# Patient Record
Sex: FEMALE | Race: WHITE | Employment: FULL TIME | URBAN - METROPOLITAN AREA
[De-identification: names, ages, dates, MRNs, and addresses within clinical notes are randomized per-mention and may not be internally consistent; named-entity substitution may affect disease eponyms.]

---

## 2017-01-25 PROBLEM — J45.20 MILD INTERMITTENT ASTHMA WITHOUT COMPLICATION: Status: ACTIVE | Noted: 2017-01-25

## 2017-11-07 PROBLEM — L98.8 SKIN LESION OF BREAST: Status: ACTIVE | Noted: 2017-11-07

## 2018-01-12 PROBLEM — E04.1 THYROID NODULE: Status: ACTIVE | Noted: 2018-01-12

## 2018-05-07 ENCOUNTER — OFFICE VISIT (OUTPATIENT)
Dept: FAMILY MEDICINE CLINIC | Age: 33
End: 2018-05-07
Payer: COMMERCIAL

## 2018-05-07 VITALS
DIASTOLIC BLOOD PRESSURE: 82 MMHG | SYSTOLIC BLOOD PRESSURE: 110 MMHG | WEIGHT: 229 LBS | HEIGHT: 69 IN | HEART RATE: 80 BPM | TEMPERATURE: 98.6 F | BODY MASS INDEX: 33.92 KG/M2 | RESPIRATION RATE: 18 BRPM | OXYGEN SATURATION: 98 %

## 2018-05-07 DIAGNOSIS — J01.90 ACUTE NON-RECURRENT SINUSITIS, UNSPECIFIED LOCATION: Primary | ICD-10-CM

## 2018-05-07 PROCEDURE — 99213 OFFICE O/P EST LOW 20 MIN: CPT | Performed by: PHYSICIAN ASSISTANT

## 2018-05-07 RX ORDER — PREDNISONE 20 MG/1
TABLET ORAL
Qty: 15 TABLET | Refills: 0 | Status: SHIPPED | OUTPATIENT
Start: 2018-05-07 | End: 2018-10-16 | Stop reason: ALTCHOICE

## 2018-05-07 RX ORDER — AMOXICILLIN AND CLAVULANATE POTASSIUM 875; 125 MG/1; MG/1
1 TABLET, FILM COATED ORAL 2 TIMES DAILY
Qty: 20 TABLET | Refills: 0 | Status: SHIPPED | OUTPATIENT
Start: 2018-05-07 | End: 2018-05-17

## 2018-05-07 RX ORDER — OMEPRAZOLE 20 MG/1
40 CAPSULE, DELAYED RELEASE ORAL DAILY
COMMUNITY
End: 2018-10-16 | Stop reason: ALTCHOICE

## 2018-05-08 RX ORDER — FLUTICASONE PROPIONATE 50 MCG
1 SPRAY, SUSPENSION (ML) NASAL DAILY
Qty: 1 BOTTLE | Refills: 3 | Status: SHIPPED | OUTPATIENT
Start: 2018-05-08 | End: 2018-10-06 | Stop reason: SDUPTHER

## 2018-06-06 ENCOUNTER — OFFICE VISIT (OUTPATIENT)
Dept: FAMILY MEDICINE CLINIC | Age: 33
End: 2018-06-06
Payer: COMMERCIAL

## 2018-06-06 VITALS
WEIGHT: 221 LBS | RESPIRATION RATE: 12 BRPM | OXYGEN SATURATION: 98 % | HEIGHT: 69 IN | SYSTOLIC BLOOD PRESSURE: 100 MMHG | TEMPERATURE: 98.2 F | HEART RATE: 70 BPM | DIASTOLIC BLOOD PRESSURE: 80 MMHG | BODY MASS INDEX: 32.73 KG/M2

## 2018-06-06 DIAGNOSIS — J20.9 BRONCHITIS WITH BRONCHOSPASM: Primary | ICD-10-CM

## 2018-06-06 DIAGNOSIS — J45.21 MILD INTERMITTENT ASTHMA WITH EXACERBATION: ICD-10-CM

## 2018-06-06 PROCEDURE — 99213 OFFICE O/P EST LOW 20 MIN: CPT | Performed by: PHYSICIAN ASSISTANT

## 2018-06-06 RX ORDER — ALBUTEROL SULFATE 90 UG/1
2 AEROSOL, METERED RESPIRATORY (INHALATION) EVERY 6 HOURS PRN
Qty: 1 INHALER | Refills: 0 | Status: SHIPPED | OUTPATIENT
Start: 2018-06-06 | End: 2020-01-08

## 2018-06-06 RX ORDER — DOXYCYCLINE HYCLATE 100 MG/1
100 CAPSULE ORAL 2 TIMES DAILY
Qty: 20 CAPSULE | Refills: 0 | Status: SHIPPED | OUTPATIENT
Start: 2018-06-06 | End: 2018-06-16

## 2018-06-06 RX ORDER — BENZONATATE 200 MG/1
200 CAPSULE ORAL 3 TIMES DAILY PRN
Qty: 15 CAPSULE | Refills: 0 | Status: SHIPPED | OUTPATIENT
Start: 2018-06-06 | End: 2019-02-08 | Stop reason: ALTCHOICE

## 2018-10-05 ENCOUNTER — OFFICE VISIT (OUTPATIENT)
Dept: FAMILY MEDICINE CLINIC | Age: 33
End: 2018-10-05
Payer: COMMERCIAL

## 2018-10-05 VITALS
OXYGEN SATURATION: 98 % | DIASTOLIC BLOOD PRESSURE: 70 MMHG | BODY MASS INDEX: 32.73 KG/M2 | RESPIRATION RATE: 18 BRPM | HEART RATE: 92 BPM | SYSTOLIC BLOOD PRESSURE: 104 MMHG | WEIGHT: 221 LBS | HEIGHT: 69 IN

## 2018-10-05 DIAGNOSIS — N92.6 IRREGULAR MENSTRUATION: ICD-10-CM

## 2018-10-05 DIAGNOSIS — F39 MOOD DISORDER (HCC): Primary | ICD-10-CM

## 2018-10-05 PROCEDURE — 1036F TOBACCO NON-USER: CPT | Performed by: FAMILY MEDICINE

## 2018-10-05 PROCEDURE — G8417 CALC BMI ABV UP PARAM F/U: HCPCS | Performed by: FAMILY MEDICINE

## 2018-10-05 PROCEDURE — G8427 DOCREV CUR MEDS BY ELIG CLIN: HCPCS | Performed by: FAMILY MEDICINE

## 2018-10-05 PROCEDURE — 99213 OFFICE O/P EST LOW 20 MIN: CPT | Performed by: FAMILY MEDICINE

## 2018-10-05 PROCEDURE — G8484 FLU IMMUNIZE NO ADMIN: HCPCS | Performed by: FAMILY MEDICINE

## 2018-10-05 ASSESSMENT — PATIENT HEALTH QUESTIONNAIRE - PHQ9
SUM OF ALL RESPONSES TO PHQ9 QUESTIONS 1 & 2: 2
1. LITTLE INTEREST OR PLEASURE IN DOING THINGS: 1
2. FEELING DOWN, DEPRESSED OR HOPELESS: 1
SUM OF ALL RESPONSES TO PHQ QUESTIONS 1-9: 2
SUM OF ALL RESPONSES TO PHQ QUESTIONS 1-9: 2

## 2018-10-08 RX ORDER — FLUTICASONE PROPIONATE 50 MCG
SPRAY, SUSPENSION (ML) NASAL
Qty: 1 BOTTLE | Refills: 3 | Status: SHIPPED | OUTPATIENT
Start: 2018-10-08 | End: 2019-03-21 | Stop reason: SDUPTHER

## 2018-10-16 ENCOUNTER — APPOINTMENT (OUTPATIENT)
Dept: ULTRASOUND IMAGING | Age: 33
End: 2018-10-16
Payer: COMMERCIAL

## 2018-10-16 ENCOUNTER — HOSPITAL ENCOUNTER (EMERGENCY)
Age: 33
Discharge: HOME | End: 2018-10-16
Attending: EMERGENCY MEDICINE
Payer: COMMERCIAL

## 2018-10-16 VITALS
WEIGHT: 215 LBS | DIASTOLIC BLOOD PRESSURE: 66 MMHG | SYSTOLIC BLOOD PRESSURE: 112 MMHG | HEIGHT: 69 IN | RESPIRATION RATE: 16 BRPM | TEMPERATURE: 98.4 F | HEART RATE: 85 BPM | OXYGEN SATURATION: 94 % | BODY MASS INDEX: 31.84 KG/M2

## 2018-10-16 DIAGNOSIS — R10.2 PELVIC PAIN: Primary | ICD-10-CM

## 2018-10-16 LAB
ANION GAP SERPL CALCULATED.3IONS-SCNC: 10 MMOL/L (ref 7–16)
BASOPHILS ABSOLUTE: 0.02 E9/L (ref 0–0.2)
BASOPHILS RELATIVE PERCENT: 0.3 % (ref 0–2)
BILIRUBIN URINE: NEGATIVE
BLOOD, URINE: NEGATIVE
BUN BLDV-MCNC: 9 MG/DL (ref 6–20)
CALCIUM SERPL-MCNC: 8.8 MG/DL (ref 8.6–10.2)
CHLORIDE BLD-SCNC: 106 MMOL/L (ref 98–107)
CLARITY: CLEAR
CLUE CELLS: NORMAL
CO2: 24 MMOL/L (ref 22–29)
COLOR: YELLOW
CREAT SERPL-MCNC: 0.7 MG/DL (ref 0.5–1)
EOSINOPHILS ABSOLUTE: 0.14 E9/L (ref 0.05–0.5)
EOSINOPHILS RELATIVE PERCENT: 2.2 % (ref 0–6)
GFR AFRICAN AMERICAN: >60
GFR NON-AFRICAN AMERICAN: >60 ML/MIN/1.73
GLUCOSE BLD-MCNC: 88 MG/DL (ref 74–109)
GLUCOSE URINE: NEGATIVE MG/DL
HCT VFR BLD CALC: 37.5 % (ref 34–48)
HEMOGLOBIN: 12.9 G/DL (ref 11.5–15.5)
IMMATURE GRANULOCYTES #: 0.01 E9/L
IMMATURE GRANULOCYTES %: 0.2 % (ref 0–5)
KETONES, URINE: NEGATIVE MG/DL
LEUKOCYTE ESTERASE, URINE: NEGATIVE
LYMPHOCYTES ABSOLUTE: 1.16 E9/L (ref 1.5–4)
LYMPHOCYTES RELATIVE PERCENT: 18.5 % (ref 20–42)
MCH RBC QN AUTO: 31.9 PG (ref 26–35)
MCHC RBC AUTO-ENTMCNC: 34.4 % (ref 32–34.5)
MCV RBC AUTO: 92.8 FL (ref 80–99.9)
MONOCYTES ABSOLUTE: 0.66 E9/L (ref 0.1–0.95)
MONOCYTES RELATIVE PERCENT: 10.5 % (ref 2–12)
NEUTROPHILS ABSOLUTE: 4.29 E9/L (ref 1.8–7.3)
NEUTROPHILS RELATIVE PERCENT: 68.3 % (ref 43–80)
NITRITE, URINE: NEGATIVE
PDW BLD-RTO: 12 FL (ref 11.5–15)
PH UA: 6.5 (ref 5–9)
PLATELET # BLD: 284 E9/L (ref 130–450)
PMV BLD AUTO: 9.8 FL (ref 7–12)
POTASSIUM SERPL-SCNC: 3.7 MMOL/L (ref 3.5–5)
PROTEIN UA: NEGATIVE MG/DL
RBC # BLD: 4.04 E12/L (ref 3.5–5.5)
SODIUM BLD-SCNC: 140 MMOL/L (ref 132–146)
SOURCE WET PREP: NORMAL
SPECIFIC GRAVITY UA: <=1.005 (ref 1–1.03)
TRICHOMONAS PREP: NORMAL
UROBILINOGEN, URINE: 0.2 E.U./DL
WBC # BLD: 6.3 E9/L (ref 4.5–11.5)
YEAST WET PREP: NORMAL

## 2018-10-16 PROCEDURE — 80048 BASIC METABOLIC PNL TOTAL CA: CPT

## 2018-10-16 PROCEDURE — 76856 US EXAM PELVIC COMPLETE: CPT

## 2018-10-16 PROCEDURE — 85025 COMPLETE CBC W/AUTO DIFF WBC: CPT

## 2018-10-16 PROCEDURE — 99284 EMERGENCY DEPT VISIT MOD MDM: CPT

## 2018-10-16 PROCEDURE — 87210 SMEAR WET MOUNT SALINE/INK: CPT

## 2018-10-16 PROCEDURE — 87491 CHLMYD TRACH DNA AMP PROBE: CPT

## 2018-10-16 PROCEDURE — 6360000002 HC RX W HCPCS: Performed by: EMERGENCY MEDICINE

## 2018-10-16 PROCEDURE — 96374 THER/PROPH/DIAG INJ IV PUSH: CPT

## 2018-10-16 PROCEDURE — 93975 VASCULAR STUDY: CPT

## 2018-10-16 PROCEDURE — 87591 N.GONORRHOEAE DNA AMP PROB: CPT

## 2018-10-16 PROCEDURE — 81003 URINALYSIS AUTO W/O SCOPE: CPT

## 2018-10-16 PROCEDURE — 36415 COLL VENOUS BLD VENIPUNCTURE: CPT

## 2018-10-16 RX ORDER — NAPROXEN 500 MG/1
500 TABLET ORAL 2 TIMES DAILY PRN
Qty: 28 TABLET | Refills: 0 | Status: SHIPPED | OUTPATIENT
Start: 2018-10-16 | End: 2019-02-08 | Stop reason: ALTCHOICE

## 2018-10-16 RX ORDER — FENTANYL CITRATE 50 UG/ML
100 INJECTION, SOLUTION INTRAMUSCULAR; INTRAVENOUS ONCE
Status: COMPLETED | OUTPATIENT
Start: 2018-10-16 | End: 2018-10-16

## 2018-10-16 RX ADMIN — FENTANYL CITRATE 100 MCG: 50 INJECTION, SOLUTION INTRAMUSCULAR; INTRAVENOUS at 18:13

## 2018-10-16 ASSESSMENT — ENCOUNTER SYMPTOMS
BACK PAIN: 0
COUGH: 0
VOMITING: 0
NAUSEA: 0
ABDOMINAL PAIN: 0
SHORTNESS OF BREATH: 0
BLOOD IN STOOL: 0

## 2018-10-16 ASSESSMENT — PAIN SCALES - GENERAL: PAINLEVEL_OUTOF10: 5

## 2018-10-16 NOTE — ED PROVIDER NOTES
Patient is a 68-year-old female presenting to the ED with pelvic pain. Patient has bilateral lower pelvic pain states that she is 2 weeks late on her period. Patient checked a presents S3 times all were negative. Patient denies any vomiting diarrhea states that the pain comes in waves states it radiates up towards her back on the right. Patient had a fever of 100.82 days ago treated with ibuprofen no recurrence of fever after that. Patient denies any change in urination and vaginal discharge or bleeding. Patient denies any worsening of symptoms with eating movement. Review of Systems   Constitutional: Negative for chills and fever. Respiratory: Negative for cough and shortness of breath. Cardiovascular: Negative for chest pain. Gastrointestinal: Negative for abdominal pain, blood in stool, nausea and vomiting. Genitourinary: Positive for flank pain and pelvic pain. Negative for dysuria and frequency. Musculoskeletal: Negative for back pain. Skin: Negative for rash. Neurological: Negative for weakness and headaches. All other systems reviewed and are negative. Physical Exam   Constitutional: She is oriented to person, place, and time. She appears well-developed and well-nourished. No distress. HENT:   Head: Normocephalic and atraumatic. Eyes: Conjunctivae are normal.   Neck: Normal range of motion. Neck supple. Cardiovascular: Normal rate, regular rhythm and normal heart sounds. No murmur heard. Pulmonary/Chest: Effort normal and breath sounds normal. No respiratory distress. She has no wheezes. She has no rales. Abdominal: Soft. Bowel sounds are normal. There is no rebound and no guarding. Mild right CVA tenderness   Musculoskeletal: She exhibits no edema. Neurological: She is alert and oriented to person, place, and time. No cranial nerve deficit. Coordination normal.   Skin: Skin is warm and dry. She is not diaphoretic.    Nursing note and vitals

## 2018-12-12 ENCOUNTER — TELEPHONE (OUTPATIENT)
Dept: ADMINISTRATIVE | Age: 33
End: 2018-12-12

## 2018-12-26 ENCOUNTER — TELEPHONE (OUTPATIENT)
Dept: ADMINISTRATIVE | Age: 33
End: 2018-12-26

## 2019-01-20 ENCOUNTER — HOSPITAL ENCOUNTER (EMERGENCY)
Age: 34
Discharge: HOME | End: 2019-01-20
Payer: COMMERCIAL

## 2019-01-20 ENCOUNTER — APPOINTMENT (OUTPATIENT)
Dept: GENERAL RADIOLOGY | Age: 34
End: 2019-01-20
Payer: COMMERCIAL

## 2019-01-20 VITALS
DIASTOLIC BLOOD PRESSURE: 71 MMHG | WEIGHT: 220 LBS | BODY MASS INDEX: 32.58 KG/M2 | HEART RATE: 74 BPM | RESPIRATION RATE: 16 BRPM | OXYGEN SATURATION: 98 % | TEMPERATURE: 98.2 F | HEIGHT: 69 IN | SYSTOLIC BLOOD PRESSURE: 106 MMHG

## 2019-01-20 DIAGNOSIS — S39.012A STRAIN OF LUMBAR REGION, INITIAL ENCOUNTER: Primary | ICD-10-CM

## 2019-01-20 PROCEDURE — 6360000002 HC RX W HCPCS: Performed by: PHYSICIAN ASSISTANT

## 2019-01-20 PROCEDURE — 99283 EMERGENCY DEPT VISIT LOW MDM: CPT

## 2019-01-20 PROCEDURE — 6370000000 HC RX 637 (ALT 250 FOR IP): Performed by: PHYSICIAN ASSISTANT

## 2019-01-20 PROCEDURE — 72110 X-RAY EXAM L-2 SPINE 4/>VWS: CPT

## 2019-01-20 PROCEDURE — 96372 THER/PROPH/DIAG INJ SC/IM: CPT

## 2019-01-20 RX ORDER — CYCLOBENZAPRINE HCL 10 MG
10 TABLET ORAL 3 TIMES DAILY PRN
Qty: 30 TABLET | Refills: 0 | Status: SHIPPED | OUTPATIENT
Start: 2019-01-20 | End: 2019-02-08 | Stop reason: ALTCHOICE

## 2019-01-20 RX ORDER — ORPHENADRINE CITRATE 30 MG/ML
60 INJECTION INTRAMUSCULAR; INTRAVENOUS ONCE
Status: COMPLETED | OUTPATIENT
Start: 2019-01-20 | End: 2019-01-20

## 2019-01-20 RX ORDER — PREDNISONE 20 MG/1
40 TABLET ORAL DAILY
Qty: 10 TABLET | Refills: 0 | Status: SHIPPED | OUTPATIENT
Start: 2019-01-20 | End: 2019-01-25

## 2019-01-20 RX ORDER — PREDNISONE 20 MG/1
40 TABLET ORAL ONCE
Status: COMPLETED | OUTPATIENT
Start: 2019-01-20 | End: 2019-01-20

## 2019-01-20 RX ORDER — KETOROLAC TROMETHAMINE 30 MG/ML
60 INJECTION, SOLUTION INTRAMUSCULAR; INTRAVENOUS ONCE
Status: COMPLETED | OUTPATIENT
Start: 2019-01-20 | End: 2019-01-20

## 2019-01-20 RX ORDER — TRAMADOL HYDROCHLORIDE 50 MG/1
50 TABLET ORAL EVERY 6 HOURS PRN
Qty: 12 TABLET | Refills: 0 | Status: SHIPPED | OUTPATIENT
Start: 2019-01-20 | End: 2019-01-25

## 2019-01-20 RX ADMIN — KETOROLAC TROMETHAMINE 60 MG: 30 INJECTION, SOLUTION INTRAMUSCULAR at 20:06

## 2019-01-20 RX ADMIN — PREDNISONE 40 MG: 20 TABLET ORAL at 20:05

## 2019-01-20 RX ADMIN — ORPHENADRINE CITRATE 60 MG: 30 INJECTION INTRAMUSCULAR; INTRAVENOUS at 20:09

## 2019-01-20 ASSESSMENT — PAIN SCALES - GENERAL
PAINLEVEL_OUTOF10: 8
PAINLEVEL_OUTOF10: 6
PAINLEVEL_OUTOF10: 8

## 2019-01-20 ASSESSMENT — PAIN DESCRIPTION - PAIN TYPE: TYPE: ACUTE PAIN

## 2019-01-20 ASSESSMENT — PAIN DESCRIPTION - LOCATION: LOCATION: BACK

## 2019-01-20 ASSESSMENT — PAIN DESCRIPTION - PROGRESSION: CLINICAL_PROGRESSION: GRADUALLY IMPROVING

## 2019-01-30 ENCOUNTER — HOSPITAL ENCOUNTER (OUTPATIENT)
Age: 34
Discharge: HOME | End: 2019-01-30
Payer: COMMERCIAL

## 2019-01-30 ENCOUNTER — HOSPITAL ENCOUNTER (EMERGENCY)
Age: 34
Discharge: HOME | End: 2019-01-30
Attending: EMERGENCY MEDICINE
Payer: COMMERCIAL

## 2019-01-30 ENCOUNTER — TELEPHONE (OUTPATIENT)
Dept: CARDIOLOGY CLINIC | Age: 34
End: 2019-01-30

## 2019-01-30 VITALS
TEMPERATURE: 97.7 F | BODY MASS INDEX: 32.44 KG/M2 | DIASTOLIC BLOOD PRESSURE: 68 MMHG | RESPIRATION RATE: 14 BRPM | WEIGHT: 219 LBS | HEIGHT: 69 IN | OXYGEN SATURATION: 100 % | HEART RATE: 78 BPM | SYSTOLIC BLOOD PRESSURE: 103 MMHG

## 2019-01-30 DIAGNOSIS — R00.2 PALPITATION: Primary | ICD-10-CM

## 2019-01-30 LAB
ALBUMIN SERPL-MCNC: 4.4 G/DL (ref 3.5–5.2)
ALP BLD-CCNC: 45 U/L (ref 35–104)
ALT SERPL-CCNC: 16 U/L (ref 0–32)
ANION GAP SERPL CALCULATED.3IONS-SCNC: 10 MMOL/L (ref 7–16)
AST SERPL-CCNC: 15 U/L (ref 0–31)
BASOPHILS ABSOLUTE: 0.05 E9/L (ref 0–0.2)
BASOPHILS RELATIVE PERCENT: 0.5 % (ref 0–2)
BILIRUB SERPL-MCNC: 0.2 MG/DL (ref 0–1.2)
BUN BLDV-MCNC: 8 MG/DL (ref 6–20)
CALCIUM SERPL-MCNC: 9.4 MG/DL (ref 8.6–10.2)
CHLORIDE BLD-SCNC: 103 MMOL/L (ref 98–107)
CO2: 28 MMOL/L (ref 22–29)
CREAT SERPL-MCNC: 0.7 MG/DL (ref 0.5–1)
EOSINOPHILS ABSOLUTE: 0.27 E9/L (ref 0.05–0.5)
EOSINOPHILS RELATIVE PERCENT: 2.5 % (ref 0–6)
GFR AFRICAN AMERICAN: >60
GFR NON-AFRICAN AMERICAN: >60 ML/MIN/1.73
GLUCOSE BLD-MCNC: 90 MG/DL (ref 74–99)
HCT VFR BLD CALC: 42.9 % (ref 34–48)
HEMOGLOBIN: 14.2 G/DL (ref 11.5–15.5)
IMMATURE GRANULOCYTES #: 0.04 E9/L
IMMATURE GRANULOCYTES %: 0.4 % (ref 0–5)
LYMPHOCYTES ABSOLUTE: 3.13 E9/L (ref 1.5–4)
LYMPHOCYTES RELATIVE PERCENT: 28.4 % (ref 20–42)
MAGNESIUM: 2.1 MG/DL (ref 1.6–2.6)
MCH RBC QN AUTO: 31.5 PG (ref 26–35)
MCHC RBC AUTO-ENTMCNC: 33.1 % (ref 32–34.5)
MCV RBC AUTO: 95.1 FL (ref 80–99.9)
MONOCYTES ABSOLUTE: 0.61 E9/L (ref 0.1–0.95)
MONOCYTES RELATIVE PERCENT: 5.5 % (ref 2–12)
NEUTROPHILS ABSOLUTE: 6.91 E9/L (ref 1.8–7.3)
NEUTROPHILS RELATIVE PERCENT: 62.7 % (ref 43–80)
PDW BLD-RTO: 11.9 FL (ref 11.5–15)
PHOSPHORUS: 3.3 MG/DL (ref 2.5–4.5)
PLATELET # BLD: 401 E9/L (ref 130–450)
PMV BLD AUTO: 10.1 FL (ref 7–12)
POTASSIUM SERPL-SCNC: 3.9 MMOL/L (ref 3.5–5)
RBC # BLD: 4.51 E12/L (ref 3.5–5.5)
SODIUM BLD-SCNC: 141 MMOL/L (ref 132–146)
T4 FREE: 1.39 NG/DL (ref 0.93–1.7)
TOTAL PROTEIN: 7.4 G/DL (ref 6.4–8.3)
TROPONIN: <0.01 NG/ML (ref 0–0.03)
TROPONIN: <0.01 NG/ML (ref 0–0.03)
TSH SERPL DL<=0.05 MIU/L-ACNC: 0.54 UIU/ML (ref 0.27–4.2)
WBC # BLD: 11 E9/L (ref 4.5–11.5)

## 2019-01-30 PROCEDURE — 99285 EMERGENCY DEPT VISIT HI MDM: CPT

## 2019-01-30 PROCEDURE — 83735 ASSAY OF MAGNESIUM: CPT

## 2019-01-30 PROCEDURE — 84484 ASSAY OF TROPONIN QUANT: CPT

## 2019-01-30 PROCEDURE — 84100 ASSAY OF PHOSPHORUS: CPT

## 2019-01-30 PROCEDURE — 85025 COMPLETE CBC W/AUTO DIFF WBC: CPT

## 2019-01-30 PROCEDURE — 84443 ASSAY THYROID STIM HORMONE: CPT

## 2019-01-30 PROCEDURE — 80053 COMPREHEN METABOLIC PANEL: CPT

## 2019-01-30 PROCEDURE — 84439 ASSAY OF FREE THYROXINE: CPT

## 2019-01-30 PROCEDURE — 2580000003 HC RX 258: Performed by: STUDENT IN AN ORGANIZED HEALTH CARE EDUCATION/TRAINING PROGRAM

## 2019-01-30 PROCEDURE — 36415 COLL VENOUS BLD VENIPUNCTURE: CPT

## 2019-01-30 RX ORDER — 0.9 % SODIUM CHLORIDE 0.9 %
1000 INTRAVENOUS SOLUTION INTRAVENOUS ONCE
Status: COMPLETED | OUTPATIENT
Start: 2019-01-30 | End: 2019-01-30

## 2019-01-30 RX ADMIN — SODIUM CHLORIDE 1000 ML: 9 INJECTION, SOLUTION INTRAVENOUS at 14:43

## 2019-01-30 ASSESSMENT — ENCOUNTER SYMPTOMS
SORE THROAT: 0
BACK PAIN: 0
CHEST TIGHTNESS: 0
CONSTIPATION: 0
BLOOD IN STOOL: 0
RHINORRHEA: 0
SHORTNESS OF BREATH: 0
NAUSEA: 0
VOMITING: 0
COUGH: 0
WHEEZING: 0
ABDOMINAL PAIN: 0
DIARRHEA: 0

## 2019-02-08 ENCOUNTER — OFFICE VISIT (OUTPATIENT)
Dept: FAMILY MEDICINE CLINIC | Age: 34
End: 2019-02-08
Payer: COMMERCIAL

## 2019-02-08 VITALS
SYSTOLIC BLOOD PRESSURE: 110 MMHG | HEART RATE: 86 BPM | BODY MASS INDEX: 31.84 KG/M2 | DIASTOLIC BLOOD PRESSURE: 82 MMHG | WEIGHT: 215 LBS | OXYGEN SATURATION: 98 % | RESPIRATION RATE: 18 BRPM | HEIGHT: 69 IN

## 2019-02-08 DIAGNOSIS — F32.A ANXIETY AND DEPRESSION: Primary | ICD-10-CM

## 2019-02-08 DIAGNOSIS — F41.9 ANXIETY AND DEPRESSION: Primary | ICD-10-CM

## 2019-02-08 LAB
EKG ATRIAL RATE: 98 BPM
EKG P AXIS: 58 DEGREES
EKG P-R INTERVAL: 134 MS
EKG Q-T INTERVAL: 354 MS
EKG QRS DURATION: 82 MS
EKG QTC CALCULATION (BAZETT): 451 MS
EKG R AXIS: 55 DEGREES
EKG T AXIS: 47 DEGREES
EKG VENTRICULAR RATE: 98 BPM

## 2019-02-08 PROCEDURE — 1036F TOBACCO NON-USER: CPT | Performed by: FAMILY MEDICINE

## 2019-02-08 PROCEDURE — G0444 DEPRESSION SCREEN ANNUAL: HCPCS | Performed by: FAMILY MEDICINE

## 2019-02-08 PROCEDURE — G8484 FLU IMMUNIZE NO ADMIN: HCPCS | Performed by: FAMILY MEDICINE

## 2019-02-08 PROCEDURE — G8417 CALC BMI ABV UP PARAM F/U: HCPCS | Performed by: FAMILY MEDICINE

## 2019-02-08 PROCEDURE — 99213 OFFICE O/P EST LOW 20 MIN: CPT | Performed by: FAMILY MEDICINE

## 2019-02-08 PROCEDURE — G8427 DOCREV CUR MEDS BY ELIG CLIN: HCPCS | Performed by: FAMILY MEDICINE

## 2019-02-08 RX ORDER — HYDROXYZINE HYDROCHLORIDE 25 MG/1
25 TABLET, FILM COATED ORAL NIGHTLY PRN
Qty: 30 TABLET | Refills: 1 | Status: SHIPPED | OUTPATIENT
Start: 2019-02-08 | End: 2019-02-18

## 2019-02-08 RX ORDER — ESCITALOPRAM OXALATE 5 MG/1
5 TABLET ORAL DAILY
Qty: 30 TABLET | Refills: 1 | Status: SHIPPED | OUTPATIENT
Start: 2019-02-08 | End: 2019-08-02 | Stop reason: ALTCHOICE

## 2019-02-08 ASSESSMENT — PATIENT HEALTH QUESTIONNAIRE - PHQ9
9. THOUGHTS THAT YOU WOULD BE BETTER OFF DEAD, OR OF HURTING YOURSELF: 0
5. POOR APPETITE OR OVEREATING: 3
8. MOVING OR SPEAKING SO SLOWLY THAT OTHER PEOPLE COULD HAVE NOTICED. OR THE OPPOSITE, BEING SO FIGETY OR RESTLESS THAT YOU HAVE BEEN MOVING AROUND A LOT MORE THAN USUAL: 2
1. LITTLE INTEREST OR PLEASURE IN DOING THINGS: 3
6. FEELING BAD ABOUT YOURSELF - OR THAT YOU ARE A FAILURE OR HAVE LET YOURSELF OR YOUR FAMILY DOWN: 2
4. FEELING TIRED OR HAVING LITTLE ENERGY: 3
7. TROUBLE CONCENTRATING ON THINGS, SUCH AS READING THE NEWSPAPER OR WATCHING TELEVISION: 3
SUM OF ALL RESPONSES TO PHQ QUESTIONS 1-9: 22
10. IF YOU CHECKED OFF ANY PROBLEMS, HOW DIFFICULT HAVE THESE PROBLEMS MADE IT FOR YOU TO DO YOUR WORK, TAKE CARE OF THINGS AT HOME, OR GET ALONG WITH OTHER PEOPLE: 3
SUM OF ALL RESPONSES TO PHQ QUESTIONS 1-9: 22
SUM OF ALL RESPONSES TO PHQ9 QUESTIONS 1 & 2: 6
2. FEELING DOWN, DEPRESSED OR HOPELESS: 3
3. TROUBLE FALLING OR STAYING ASLEEP: 3

## 2019-03-21 DIAGNOSIS — J01.90 ACUTE NON-RECURRENT SINUSITIS, UNSPECIFIED LOCATION: Primary | ICD-10-CM

## 2019-03-22 RX ORDER — FLUTICASONE PROPIONATE 50 MCG
SPRAY, SUSPENSION (ML) NASAL
Qty: 1 BOTTLE | Refills: 3 | Status: SHIPPED | OUTPATIENT
Start: 2019-03-22 | End: 2020-01-08 | Stop reason: SDUPTHER

## 2019-05-01 ENCOUNTER — HOSPITAL ENCOUNTER (EMERGENCY)
Age: 34
Discharge: HOME | End: 2019-05-01
Attending: EMERGENCY MEDICINE
Payer: COMMERCIAL

## 2019-05-01 ENCOUNTER — APPOINTMENT (OUTPATIENT)
Dept: CT IMAGING | Age: 34
End: 2019-05-01
Payer: COMMERCIAL

## 2019-05-01 VITALS
BODY MASS INDEX: 33.33 KG/M2 | HEIGHT: 69 IN | SYSTOLIC BLOOD PRESSURE: 110 MMHG | WEIGHT: 225 LBS | RESPIRATION RATE: 16 BRPM | HEART RATE: 74 BPM | OXYGEN SATURATION: 99 % | TEMPERATURE: 98.8 F | DIASTOLIC BLOOD PRESSURE: 70 MMHG

## 2019-05-01 DIAGNOSIS — M54.31 SCIATICA OF RIGHT SIDE: Primary | ICD-10-CM

## 2019-05-01 LAB
ALBUMIN SERPL-MCNC: 4.5 G/DL (ref 3.5–5.2)
ALP BLD-CCNC: 35 U/L (ref 35–104)
ALT SERPL-CCNC: 13 U/L (ref 0–32)
ANION GAP SERPL CALCULATED.3IONS-SCNC: 11 MMOL/L (ref 7–16)
AST SERPL-CCNC: 15 U/L (ref 0–31)
BACTERIA: NORMAL /HPF
BASOPHILS ABSOLUTE: 0.06 E9/L (ref 0–0.2)
BASOPHILS RELATIVE PERCENT: 0.5 % (ref 0–2)
BILIRUB SERPL-MCNC: <0.2 MG/DL (ref 0–1.2)
BILIRUBIN URINE: NEGATIVE
BLOOD, URINE: NEGATIVE
BUN BLDV-MCNC: 18 MG/DL (ref 6–20)
CALCIUM SERPL-MCNC: 9.7 MG/DL (ref 8.6–10.2)
CHLORIDE BLD-SCNC: 103 MMOL/L (ref 98–107)
CLARITY: CLEAR
CO2: 26 MMOL/L (ref 22–29)
COLOR: YELLOW
CREAT SERPL-MCNC: 0.8 MG/DL (ref 0.5–1)
EOSINOPHILS ABSOLUTE: 0.23 E9/L (ref 0.05–0.5)
EOSINOPHILS RELATIVE PERCENT: 2 % (ref 0–6)
EPITHELIAL CELLS, UA: NORMAL /HPF
GFR AFRICAN AMERICAN: >60
GFR NON-AFRICAN AMERICAN: >60 ML/MIN/1.73
GLUCOSE BLD-MCNC: 76 MG/DL (ref 74–99)
GLUCOSE URINE: NEGATIVE MG/DL
HCG, URINE, POC: NEGATIVE
HCT VFR BLD CALC: 40.1 % (ref 34–48)
HEMOGLOBIN: 13.5 G/DL (ref 11.5–15.5)
IMMATURE GRANULOCYTES #: 0.04 E9/L
IMMATURE GRANULOCYTES %: 0.3 % (ref 0–5)
KETONES, URINE: ABNORMAL MG/DL
LACTIC ACID: 1.3 MMOL/L (ref 0.5–2.2)
LEUKOCYTE ESTERASE, URINE: ABNORMAL
LIPASE: 29 U/L (ref 13–60)
LYMPHOCYTES ABSOLUTE: 3.12 E9/L (ref 1.5–4)
LYMPHOCYTES RELATIVE PERCENT: 27.1 % (ref 20–42)
Lab: NORMAL
MCH RBC QN AUTO: 31.6 PG (ref 26–35)
MCHC RBC AUTO-ENTMCNC: 33.7 % (ref 32–34.5)
MCV RBC AUTO: 93.9 FL (ref 80–99.9)
MONOCYTES ABSOLUTE: 0.62 E9/L (ref 0.1–0.95)
MONOCYTES RELATIVE PERCENT: 5.4 % (ref 2–12)
NEGATIVE QC PASS/FAIL: NORMAL
NEUTROPHILS ABSOLUTE: 7.46 E9/L (ref 1.8–7.3)
NEUTROPHILS RELATIVE PERCENT: 64.7 % (ref 43–80)
NITRITE, URINE: NEGATIVE
PDW BLD-RTO: 11.9 FL (ref 11.5–15)
PH UA: 6 (ref 5–9)
PLATELET # BLD: 319 E9/L (ref 130–450)
PMV BLD AUTO: 10.3 FL (ref 7–12)
POSITIVE QC PASS/FAIL: NORMAL
POTASSIUM SERPL-SCNC: 4.4 MMOL/L (ref 3.5–5)
PROTEIN UA: NEGATIVE MG/DL
RBC # BLD: 4.27 E12/L (ref 3.5–5.5)
RBC UA: NORMAL /HPF (ref 0–2)
SODIUM BLD-SCNC: 140 MMOL/L (ref 132–146)
SPECIFIC GRAVITY UA: >=1.03 (ref 1–1.03)
TOTAL PROTEIN: 7.3 G/DL (ref 6.4–8.3)
UROBILINOGEN, URINE: 0.2 E.U./DL
WBC # BLD: 11.5 E9/L (ref 4.5–11.5)
WBC UA: NORMAL /HPF (ref 0–5)

## 2019-05-01 PROCEDURE — 83690 ASSAY OF LIPASE: CPT

## 2019-05-01 PROCEDURE — 81001 URINALYSIS AUTO W/SCOPE: CPT

## 2019-05-01 PROCEDURE — 6370000000 HC RX 637 (ALT 250 FOR IP): Performed by: EMERGENCY MEDICINE

## 2019-05-01 PROCEDURE — 99284 EMERGENCY DEPT VISIT MOD MDM: CPT

## 2019-05-01 PROCEDURE — 96375 TX/PRO/DX INJ NEW DRUG ADDON: CPT

## 2019-05-01 PROCEDURE — 2580000003 HC RX 258: Performed by: STUDENT IN AN ORGANIZED HEALTH CARE EDUCATION/TRAINING PROGRAM

## 2019-05-01 PROCEDURE — 85025 COMPLETE CBC W/AUTO DIFF WBC: CPT

## 2019-05-01 PROCEDURE — 74176 CT ABD & PELVIS W/O CONTRAST: CPT

## 2019-05-01 PROCEDURE — 6360000002 HC RX W HCPCS: Performed by: EMERGENCY MEDICINE

## 2019-05-01 PROCEDURE — 96372 THER/PROPH/DIAG INJ SC/IM: CPT

## 2019-05-01 PROCEDURE — 83605 ASSAY OF LACTIC ACID: CPT

## 2019-05-01 PROCEDURE — 96374 THER/PROPH/DIAG INJ IV PUSH: CPT

## 2019-05-01 PROCEDURE — 80053 COMPREHEN METABOLIC PANEL: CPT

## 2019-05-01 PROCEDURE — 6360000002 HC RX W HCPCS: Performed by: STUDENT IN AN ORGANIZED HEALTH CARE EDUCATION/TRAINING PROGRAM

## 2019-05-01 RX ORDER — NAPROXEN 375 MG/1
375 TABLET ORAL 2 TIMES DAILY WITH MEALS
Qty: 10 TABLET | Refills: 0 | Status: SHIPPED | OUTPATIENT
Start: 2019-05-01 | End: 2020-10-19

## 2019-05-01 RX ORDER — KETOROLAC TROMETHAMINE 30 MG/ML
15 INJECTION, SOLUTION INTRAMUSCULAR; INTRAVENOUS ONCE
Status: COMPLETED | OUTPATIENT
Start: 2019-05-01 | End: 2019-05-01

## 2019-05-01 RX ORDER — ORPHENADRINE CITRATE 30 MG/ML
60 INJECTION INTRAMUSCULAR; INTRAVENOUS ONCE
Status: COMPLETED | OUTPATIENT
Start: 2019-05-01 | End: 2019-05-01

## 2019-05-01 RX ORDER — HYDROCODONE BITARTRATE AND ACETAMINOPHEN 5; 325 MG/1; MG/1
1 TABLET ORAL ONCE
Status: COMPLETED | OUTPATIENT
Start: 2019-05-01 | End: 2019-05-01

## 2019-05-01 RX ORDER — FENTANYL CITRATE 50 UG/ML
50 INJECTION, SOLUTION INTRAMUSCULAR; INTRAVENOUS ONCE
Status: COMPLETED | OUTPATIENT
Start: 2019-05-01 | End: 2019-05-01

## 2019-05-01 RX ORDER — CYCLOBENZAPRINE HCL 10 MG
10 TABLET ORAL 3 TIMES DAILY PRN
Qty: 12 TABLET | Refills: 0 | Status: SHIPPED | OUTPATIENT
Start: 2019-05-01 | End: 2019-08-02

## 2019-05-01 RX ORDER — METHYLPREDNISOLONE 4 MG/1
TABLET ORAL
Qty: 1 KIT | Status: SHIPPED | OUTPATIENT
Start: 2019-05-01 | End: 2019-05-07

## 2019-05-01 RX ORDER — PREDNISONE 20 MG/1
60 TABLET ORAL ONCE
Status: COMPLETED | OUTPATIENT
Start: 2019-05-01 | End: 2019-05-01

## 2019-05-01 RX ORDER — 0.9 % SODIUM CHLORIDE 0.9 %
1000 INTRAVENOUS SOLUTION INTRAVENOUS ONCE
Status: COMPLETED | OUTPATIENT
Start: 2019-05-01 | End: 2019-05-01

## 2019-05-01 RX ORDER — HYDROCODONE BITARTRATE AND ACETAMINOPHEN 5; 325 MG/1; MG/1
1 TABLET ORAL EVERY 6 HOURS PRN
Qty: 12 TABLET | Refills: 0 | Status: SHIPPED | OUTPATIENT
Start: 2019-05-01 | End: 2019-05-04

## 2019-05-01 RX ORDER — ONDANSETRON 2 MG/ML
4 INJECTION INTRAMUSCULAR; INTRAVENOUS ONCE
Status: COMPLETED | OUTPATIENT
Start: 2019-05-01 | End: 2019-05-01

## 2019-05-01 RX ORDER — FENTANYL CITRATE 50 UG/ML
50 INJECTION, SOLUTION INTRAMUSCULAR; INTRAVENOUS ONCE
Status: DISCONTINUED | OUTPATIENT
Start: 2019-05-01 | End: 2019-05-01

## 2019-05-01 RX ADMIN — ORPHENADRINE CITRATE 60 MG: 60 INJECTION INTRAMUSCULAR; INTRAVENOUS at 20:03

## 2019-05-01 RX ADMIN — SODIUM CHLORIDE 1000 ML: 9 INJECTION, SOLUTION INTRAVENOUS at 19:08

## 2019-05-01 RX ADMIN — HYDROCODONE BITARTRATE AND ACETAMINOPHEN 1 TABLET: 5; 325 TABLET ORAL at 20:04

## 2019-05-01 RX ADMIN — FENTANYL CITRATE 50 MCG: 50 INJECTION, SOLUTION INTRAMUSCULAR; INTRAVENOUS at 21:32

## 2019-05-01 RX ADMIN — ONDANSETRON 4 MG: 2 INJECTION INTRAMUSCULAR; INTRAVENOUS at 19:07

## 2019-05-01 RX ADMIN — PREDNISONE 60 MG: 20 TABLET ORAL at 20:03

## 2019-05-01 RX ADMIN — KETOROLAC TROMETHAMINE 15 MG: 30 INJECTION, SOLUTION INTRAMUSCULAR at 19:08

## 2019-05-01 ASSESSMENT — ENCOUNTER SYMPTOMS
ABDOMINAL DISTENTION: 0
VOMITING: 0
NAUSEA: 1
DIARRHEA: 0
BACK PAIN: 1
SHORTNESS OF BREATH: 0
CHEST TIGHTNESS: 0
ABDOMINAL PAIN: 1
PHOTOPHOBIA: 0

## 2019-05-01 ASSESSMENT — PAIN SCALES - GENERAL
PAINLEVEL_OUTOF10: 7
PAINLEVEL_OUTOF10: 7
PAINLEVEL_OUTOF10: 6
PAINLEVEL_OUTOF10: 8
PAINLEVEL_OUTOF10: 6

## 2019-05-01 ASSESSMENT — PAIN DESCRIPTION - LOCATION: LOCATION: BACK

## 2019-05-01 ASSESSMENT — PAIN DESCRIPTION - DESCRIPTORS: DESCRIPTORS: ACHING

## 2019-05-01 ASSESSMENT — PAIN DESCRIPTION - FREQUENCY: FREQUENCY: INTERMITTENT

## 2019-05-01 ASSESSMENT — PAIN DESCRIPTION - ORIENTATION: ORIENTATION: RIGHT;LOWER

## 2019-05-01 ASSESSMENT — PAIN DESCRIPTION - PAIN TYPE: TYPE: ACUTE PAIN

## 2019-05-01 NOTE — ED PROVIDER NOTES
Angela Cabrera is a 34year old female who presents with right flank pain radiating around to her RLQ. Pain started at 1030 am today and has worsened. Patient has no history of kidney stone. Patient is nauseous but has not had any episodes of emesis. Patient is not having any fevers or chills. Patient has no medical history. Patient went to PCP who found her to have blood in her urine. PCP sent patient in for CT scan and evaluation for kidney stone. The history is provided by the patient. Abdominal Pain   Pain location:  R flank  Pain quality: dull    Pain radiates to:  RLQ  Pain severity:  Severe  Onset quality:  Sudden  Duration:  9 hours  Timing:  Constant  Progression:  Worsening  Chronicity:  New  Context: not sick contacts and not trauma    Relieved by:  Nothing  Worsened by: Movement and deep breathing  Ineffective treatments: NSAIDS at 1030am.  Associated symptoms: nausea    Associated symptoms: no chest pain, no chills, no diarrhea, no dysuria, no fatigue, no shortness of breath and no vomiting    Risk factors: no alcohol abuse, no NSAID use and not pregnant        Review of Systems   Constitutional: Negative for chills, diaphoresis and fatigue. Eyes: Negative for photophobia and visual disturbance. Respiratory: Negative for chest tightness and shortness of breath. Cardiovascular: Negative for chest pain. Gastrointestinal: Positive for abdominal pain and nausea. Negative for abdominal distention, diarrhea and vomiting. Genitourinary: Positive for flank pain. Negative for difficulty urinating and dysuria. Musculoskeletal: Positive for back pain. Skin: Negative for pallor and rash. Neurological: Negative for dizziness and headaches. Physical Exam   Constitutional: She is oriented to person, place, and time. She appears well-developed and well-nourished. No distress. HENT:   Head: Normocephalic and atraumatic. Eyes: Conjunctivae are normal. Right eye exhibits no discharge. Left eye exhibits no discharge. Neck: Neck supple. Cardiovascular: Normal rate and regular rhythm. Pulmonary/Chest: Effort normal and breath sounds normal. No respiratory distress. Abdominal: Soft. Bowel sounds are normal. She exhibits no distension. There is tenderness. There is no rebound and no guarding. CVA tenderness right side   Musculoskeletal: She exhibits no edema or deformity. Neurological: She is alert and oriented to person, place, and time. No cranial nerve deficit. Skin: Capillary refill takes less than 2 seconds. She is not diaphoretic. Psychiatric: She has a normal mood and affect. Her behavior is normal.   Nursing note and vitals reviewed. Procedures    MDM  Number of Diagnoses or Management Options  Sciatica of right side:   Diagnosis management comments: Mary Plaza was sent in by PCP for evaluation for suspected kidney stone. Patient's lab work and CT abdomen were not remarkable. Patient has no evidence of UTI or kidney stone. Patient reported blood was in her urine in PCP office but is not currently in urine. Patient's pain appears to be due to sciatic pain. Patient was given norflex, toradol, and zofran in ED with improvement of symptoms. Patient's vitals were stable in ED. Patient will be discharged on medication for sciatic treatment. Patient advised to call her pcp to arrange follow up. Patient stable while in ED. Patient afebrile. Discussed results and plan with patient along with indications to return to ED. Patient understand. ED Course as of May 01 1935   Wed May 01, 2019   1932 ATTENDING PROVIDER ATTESTATION:     I have personally performed and/or participated in the history, exam, medical decision making, and procedures and agree with all pertinent clinical information unless otherwise noted. I have also reviewed and agree with the past medical, family and social history unless otherwise noted.     I have discussed this patient in detail with the resident been reviewed.     Allergies: Bupropion and Other    -------------------------------------------------- RESULTS -------------------------------------------------  Labs:  Results for orders placed or performed during the hospital encounter of 05/01/19   CBC Auto Differential   Result Value Ref Range    WBC 11.5 4.5 - 11.5 E9/L    RBC 4.27 3.50 - 5.50 E12/L    Hemoglobin 13.5 11.5 - 15.5 g/dL    Hematocrit 40.1 34.0 - 48.0 %    MCV 93.9 80.0 - 99.9 fL    MCH 31.6 26.0 - 35.0 pg    MCHC 33.7 32.0 - 34.5 %    RDW 11.9 11.5 - 15.0 fL    Platelets 051 910 - 407 E9/L    MPV 10.3 7.0 - 12.0 fL    Neutrophils % 64.7 43.0 - 80.0 %    Immature Granulocytes % 0.3 0.0 - 5.0 %    Lymphocytes % 27.1 20.0 - 42.0 %    Monocytes % 5.4 2.0 - 12.0 %    Eosinophils % 2.0 0.0 - 6.0 %    Basophils % 0.5 0.0 - 2.0 %    Neutrophils # 7.46 (H) 1.80 - 7.30 E9/L    Immature Granulocytes # 0.04 E9/L    Lymphocytes # 3.12 1.50 - 4.00 E9/L    Monocytes # 0.62 0.10 - 0.95 E9/L    Eosinophils # 0.23 0.05 - 0.50 E9/L    Basophils # 0.06 0.00 - 0.20 E9/L   Comprehensive Metabolic Panel   Result Value Ref Range    Sodium 140 132 - 146 mmol/L    Potassium 4.4 3.5 - 5.0 mmol/L    Chloride 103 98 - 107 mmol/L    CO2 26 22 - 29 mmol/L    Anion Gap 11 7 - 16 mmol/L    Glucose 76 74 - 99 mg/dL    BUN 18 6 - 20 mg/dL    CREATININE 0.8 0.5 - 1.0 mg/dL    GFR Non-African American >60 >=60 mL/min/1.73    GFR African American >60     Calcium 9.7 8.6 - 10.2 mg/dL    Total Protein 7.3 6.4 - 8.3 g/dL    Alb 4.5 3.5 - 5.2 g/dL    Total Bilirubin <0.2 0.0 - 1.2 mg/dL    Alkaline Phosphatase 35 35 - 104 U/L    ALT 13 0 - 32 U/L    AST 15 0 - 31 U/L   Lipase   Result Value Ref Range    Lipase 29 13 - 60 U/L   Lactic acid, plasma   Result Value Ref Range    Lactic Acid 1.3 0.5 - 2.2 mmol/L   Urinalysis   Result Value Ref Range    Color, UA Yellow Straw/Yellow    Clarity, UA Clear Clear    Glucose, Ur Negative Negative mg/dL    Bilirubin Urine Negative Negative and on Monday.      --------------------------------- ADDITIONAL PROVIDER NOTES ---------------------------------  At this time the patient is without objective evidence of an acute process requiring hospitalization or inpatient management. They have remained hemodynamically stable throughout their entire ED visit and are stable for discharge with outpatient follow-up. The plan has been discussed in detail and they are aware of the specific conditions for emergent return, as well as the importance of follow-up. New Prescriptions    CYCLOBENZAPRINE (FLEXERIL) 10 MG TABLET    Take 1 tablet by mouth 3 times daily as needed for Muscle spasms    HYDROCODONE-ACETAMINOPHEN (NORCO) 5-325 MG PER TABLET    Take 1 tablet by mouth every 6 hours as needed for Pain for up to 3 days. METHYLPREDNISOLONE (MEDROL, NENA,) 4 MG TABLET    Take by mouth. NAPROXEN (NAPROSYN) 375 MG TABLET    Take 1 tablet by mouth 2 times daily (with meals) for 5 days       Diagnosis:  1. Sciatica of right side        Disposition:  Patient's disposition: Discharge to home  Patient's condition is stable.             Sara Jones MD  05/01/19 2011

## 2019-05-01 NOTE — ED NOTES
FIRST PROVIDER CONTACT ASSESSMENT NOTE      Department of Emergency Medicine   5/1/19  5:18 PM    Chief Complaint: Back Pain (lower back to right leg) and Flank Pain (sent in by PCP for kidney stone r/o)      History of Present Illness:    Angela Cabrera is a 34 y.o. female who presents to the ED by private car for sent in by pcp to rule out kidney stones. Right flank pain  Focused Screening Exam:  Constitutional:  Alert, appears stated age and is in no distress.       *ALLERGIES*     Bupropion and Other     ED Triage Vitals [05/01/19 2041]   BP Temp Temp Source Pulse Resp SpO2 Height Weight   132/70 98.8 °F (37.1 °C) Oral 89 16 99 % 5' 9\" (1.753 m) 225 lb (102.1 kg)        Initial Plan of Care:  Initiate Treatment-Testing, Proceed toTreatment Area When Bed Available for ED Attending/MLP to Continue Care    -----------------END OF FIRST PROVIDER CONTACT ASSESSMENT NOTE--------------  Electronically signed by MANDI Olivera   DD: 5/1/19       MANDI Hope  05/01/19 [id number]

## 2019-08-02 ENCOUNTER — APPOINTMENT (OUTPATIENT)
Dept: CT IMAGING | Age: 34
End: 2019-08-02
Payer: COMMERCIAL

## 2019-08-02 ENCOUNTER — HOSPITAL ENCOUNTER (EMERGENCY)
Age: 34
Discharge: HOME | End: 2019-08-02
Attending: EMERGENCY MEDICINE
Payer: COMMERCIAL

## 2019-08-02 ENCOUNTER — APPOINTMENT (OUTPATIENT)
Dept: GENERAL RADIOLOGY | Age: 34
End: 2019-08-02
Payer: COMMERCIAL

## 2019-08-02 VITALS
HEIGHT: 69 IN | BODY MASS INDEX: 34.66 KG/M2 | RESPIRATION RATE: 18 BRPM | SYSTOLIC BLOOD PRESSURE: 138 MMHG | WEIGHT: 234 LBS | OXYGEN SATURATION: 98 % | DIASTOLIC BLOOD PRESSURE: 67 MMHG | TEMPERATURE: 98.8 F | HEART RATE: 102 BPM

## 2019-08-02 DIAGNOSIS — S01.01XA LACERATION OF SCALP WITHOUT FOREIGN BODY, INITIAL ENCOUNTER: ICD-10-CM
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
## 2019-08-02 LAB
HCG, URINE, POC: NEGATIVE
Lab: NORMAL
NEGATIVE QC PASS/FAIL: NORMAL
POSITIVE QC PASS/FAIL: NORMAL

## 2019-08-02 PROCEDURE — 72125 CT NECK SPINE W/O DYE: CPT

## 2019-08-02 PROCEDURE — 70450 CT HEAD/BRAIN W/O DYE: CPT

## 2019-08-02 PROCEDURE — 99283 EMERGENCY DEPT VISIT LOW MDM: CPT

## 2019-08-02 PROCEDURE — 90471 IMMUNIZATION ADMIN: CPT | Performed by: EMERGENCY MEDICINE

## 2019-08-02 PROCEDURE — 12001 RPR S/N/AX/GEN/TRNK 2.5CM/<: CPT

## 2019-08-02 PROCEDURE — 6370000000 HC RX 637 (ALT 250 FOR IP): Performed by: EMERGENCY MEDICINE

## 2019-08-02 PROCEDURE — 90715 TDAP VACCINE 7 YRS/> IM: CPT | Performed by: EMERGENCY MEDICINE

## 2019-08-02 PROCEDURE — 73130 X-RAY EXAM OF HAND: CPT

## 2019-08-02 PROCEDURE — 6360000002 HC RX W HCPCS: Performed by: EMERGENCY MEDICINE

## 2019-08-02 PROCEDURE — 73090 X-RAY EXAM OF FOREARM: CPT

## 2019-08-02 PROCEDURE — 96372 THER/PROPH/DIAG INJ SC/IM: CPT

## 2019-08-02 RX ORDER — ONDANSETRON 4 MG/1
4 TABLET, ORALLY DISINTEGRATING ORAL ONCE
Status: COMPLETED | OUTPATIENT
Start: 2019-08-02 | End: 2019-08-02

## 2019-08-02 RX ORDER — METHOCARBAMOL 500 MG/1
500 TABLET, FILM COATED ORAL 3 TIMES DAILY
Qty: 30 TABLET | Refills: 0 | Status: SHIPPED | OUTPATIENT
Start: 2019-08-02 | End: 2019-08-12

## 2019-08-02 RX ORDER — LIDOCAINE HYDROCHLORIDE AND EPINEPHRINE 10; 10 MG/ML; UG/ML
20 INJECTION, SOLUTION INFILTRATION; PERINEURAL ONCE
Status: DISCONTINUED | OUTPATIENT
Start: 2019-08-02 | End: 2019-08-02 | Stop reason: HOSPADM

## 2019-08-02 RX ORDER — DULOXETIN HYDROCHLORIDE 30 MG/1
30 CAPSULE, DELAYED RELEASE ORAL DAILY
COMMUNITY
Start: 2019-07-24 | End: 2020-01-08 | Stop reason: SDUPTHER

## 2019-08-02 RX ORDER — KETOROLAC TROMETHAMINE 30 MG/ML
30 INJECTION, SOLUTION INTRAMUSCULAR; INTRAVENOUS ONCE
Status: COMPLETED | OUTPATIENT
Start: 2019-08-02 | End: 2019-08-02

## 2019-08-02 RX ADMIN — TETANUS TOXOID, REDUCED DIPHTHERIA TOXOID AND ACELLULAR PERTUSSIS VACCINE, ADSORBED 0.5 ML: 5; 2.5; 8; 8; 2.5 SUSPENSION INTRAMUSCULAR at 19:46

## 2019-08-02 RX ADMIN — KETOROLAC TROMETHAMINE 30 MG: 30 INJECTION, SOLUTION INTRAMUSCULAR at 19:54

## 2019-08-02 RX ADMIN — ONDANSETRON 4 MG: 4 TABLET, ORALLY DISINTEGRATING ORAL at 20:55

## 2019-08-02 ASSESSMENT — ENCOUNTER SYMPTOMS
ABDOMINAL PAIN: 0
COUGH: 0
NAUSEA: 0
SHORTNESS OF BREATH: 0

## 2019-08-02 ASSESSMENT — PAIN SCALES - GENERAL: PAINLEVEL_OUTOF10: 5
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
## 2020-01-08 ENCOUNTER — OFFICE VISIT (OUTPATIENT)
Dept: PRIMARY CARE CLINIC | Age: 35
End: 2020-01-08
Payer: COMMERCIAL

## 2020-01-08 VITALS
BODY MASS INDEX: 35.55 KG/M2 | RESPIRATION RATE: 18 BRPM | WEIGHT: 240 LBS | HEART RATE: 86 BPM | TEMPERATURE: 98.2 F | HEIGHT: 69 IN | OXYGEN SATURATION: 97 % | DIASTOLIC BLOOD PRESSURE: 72 MMHG | SYSTOLIC BLOOD PRESSURE: 100 MMHG

## 2020-01-08 PROCEDURE — G8427 DOCREV CUR MEDS BY ELIG CLIN: HCPCS | Performed by: FAMILY MEDICINE

## 2020-01-08 PROCEDURE — G8484 FLU IMMUNIZE NO ADMIN: HCPCS | Performed by: FAMILY MEDICINE

## 2020-01-08 PROCEDURE — 99204 OFFICE O/P NEW MOD 45 MIN: CPT | Performed by: FAMILY MEDICINE

## 2020-01-08 PROCEDURE — 1036F TOBACCO NON-USER: CPT | Performed by: FAMILY MEDICINE

## 2020-01-08 PROCEDURE — G8417 CALC BMI ABV UP PARAM F/U: HCPCS | Performed by: FAMILY MEDICINE

## 2020-01-08 RX ORDER — DULOXETIN HYDROCHLORIDE 20 MG/1
20 CAPSULE, DELAYED RELEASE ORAL DAILY
Qty: 30 CAPSULE | Refills: 3 | Status: SHIPPED
Start: 2020-01-08 | End: 2020-08-11 | Stop reason: ALTCHOICE

## 2020-01-08 RX ORDER — OMEPRAZOLE 40 MG/1
40 CAPSULE, DELAYED RELEASE ORAL
Qty: 30 CAPSULE | Refills: 2 | Status: SHIPPED
Start: 2020-01-08 | End: 2020-08-11 | Stop reason: ALTCHOICE

## 2020-01-08 RX ORDER — FLUTICASONE PROPIONATE 50 MCG
SPRAY, SUSPENSION (ML) NASAL
Qty: 1 BOTTLE | Refills: 3 | Status: SHIPPED
Start: 2020-01-08 | End: 2020-08-24 | Stop reason: SDUPTHER

## 2020-01-08 RX ORDER — NORGESTIMATE AND ETHINYL ESTRADIOL 0.25-0.035
KIT ORAL
Qty: 1 PACKET | Refills: 5 | Status: SHIPPED
Start: 2020-01-08 | End: 2020-08-11 | Stop reason: ALTCHOICE

## 2020-01-08 SDOH — ECONOMIC STABILITY: FOOD INSECURITY: WITHIN THE PAST 12 MONTHS, YOU WORRIED THAT YOUR FOOD WOULD RUN OUT BEFORE YOU GOT MONEY TO BUY MORE.: NEVER TRUE

## 2020-01-08 SDOH — ECONOMIC STABILITY: INCOME INSECURITY: HOW HARD IS IT FOR YOU TO PAY FOR THE VERY BASICS LIKE FOOD, HOUSING, MEDICAL CARE, AND HEATING?: SOMEWHAT HARD

## 2020-01-08 SDOH — HEALTH STABILITY: MENTAL HEALTH: HOW MANY STANDARD DRINKS CONTAINING ALCOHOL DO YOU HAVE ON A TYPICAL DAY?: 3 OR 4

## 2020-01-08 SDOH — HEALTH STABILITY: MENTAL HEALTH: HOW OFTEN DO YOU HAVE A DRINK CONTAINING ALCOHOL?: 2-4 TIMES A MONTH

## 2020-01-08 SDOH — ECONOMIC STABILITY: FOOD INSECURITY: WITHIN THE PAST 12 MONTHS, THE FOOD YOU BOUGHT JUST DIDN'T LAST AND YOU DIDN'T HAVE MONEY TO GET MORE.: NEVER TRUE

## 2020-01-08 SDOH — ECONOMIC STABILITY: TRANSPORTATION INSECURITY
IN THE PAST 12 MONTHS, HAS THE LACK OF TRANSPORTATION KEPT YOU FROM MEDICAL APPOINTMENTS OR FROM GETTING MEDICATIONS?: NO

## 2020-01-08 SDOH — ECONOMIC STABILITY: TRANSPORTATION INSECURITY
IN THE PAST 12 MONTHS, HAS LACK OF TRANSPORTATION KEPT YOU FROM MEETINGS, WORK, OR FROM GETTING THINGS NEEDED FOR DAILY LIVING?: NO

## 2020-01-08 ASSESSMENT — ENCOUNTER SYMPTOMS
ABDOMINAL DISTENTION: 1
SHORTNESS OF BREATH: 0
APNEA: 0
SINUS PAIN: 0
BACK PAIN: 1
SORE THROAT: 0
EYES NEGATIVE: 1
SINUS PRESSURE: 0
WHEEZING: 0
VOMITING: 0
BLOOD IN STOOL: 1
DIARRHEA: 0
CONSTIPATION: 0
ABDOMINAL PAIN: 1
COUGH: 0
NAUSEA: 1
RHINORRHEA: 0
TROUBLE SWALLOWING: 1
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
## 2020-02-13 ENCOUNTER — TELEPHONE (OUTPATIENT)
Dept: PRIMARY CARE CLINIC | Age: 35
End: 2020-02-13

## 2020-05-29 ENCOUNTER — NURSE TRIAGE (OUTPATIENT)
Dept: OTHER | Facility: CLINIC | Age: 35
End: 2020-05-29

## 2020-05-29 NOTE — TELEPHONE ENCOUNTER
Reason for Disposition   [1] Fever (or feeling feverish) OR cough AND [2] within 14 Days of COVID-19 EXPOSURE (Close Contact)    Answer Assessment - Initial Assessment Questions  1. CLOSE CONTACT: \"Who is the person with the confirmed or suspected COVID-19 infection that you were exposed to? \"      hairdresser  2. PLACE of CONTACT: \"Where were you when you were exposed to COVID-19? \" (e.g., home, school, medical waiting room; which city?)      Hair salon  3. TYPE of CONTACT: \"How much contact was there? \" (e.g., sitting next to, live in same house, work in same office, same building)      Working on hair   4. DURATION of CONTACT: \"How long were you in contact with the COVID-19 patient? \" (e.g., a few seconds, passed by person, a few minutes, live with the patient)      Four hours  5. DATE of CONTACT: \"When did you have contact with a COVID-19 patient? \" (e.g., how many days ago)      05/23  6. TRAVEL: \"Have you traveled out of the country recently? \" If so, \"When and where? \"      * Also ask about out-of-state travel, since the [facility] has identified some high risk cities for community spread in the [address] [place] [address],[address]. * Note: Travel becomes less relevant if there is widespread community transmission where the patient lives. n/a  7. COMMUNITY SPREAD: \"Are there lots of cases of COVID-19 (community spread) where you live? \" (See public health department website, if unsure)    * MAJOR community spread: high number of cases; numbers of cases are increasing; many people hospitalized. * MINOR community spread: low number of cases; not increasing; few or no people hospitalized      Not sure  8. SYMPTOMS: \"Do you have any symptoms? \" (e.g., fever, cough, breathing difficulty)      Fever, cough, sob, body aches, abd pain, sore throat  9. PREGNANCY OR POSTPARTUM: \"Is there any chance you are pregnant? \" \"When was your last menstrual period? \" \"Did you deliver in the last 2 weeks? \"      no  10.  HIGH RISK: \"Do you have any heart or lung

## 2020-06-04 ENCOUNTER — TELEPHONE (OUTPATIENT)
Dept: FAMILY MEDICINE CLINIC | Age: 35
End: 2020-06-04

## 2020-08-08 ENCOUNTER — HOSPITAL ENCOUNTER (OUTPATIENT)
Age: 35
Discharge: HOME | End: 2020-08-08
Payer: COMMERCIAL

## 2020-08-08 LAB
ALBUMIN SERPL-MCNC: 4.3 G/DL (ref 3.5–5.2)
ALP BLD-CCNC: 43 U/L (ref 35–104)
ALT SERPL-CCNC: 12 U/L (ref 0–32)
ANION GAP SERPL CALCULATED.3IONS-SCNC: 12 MMOL/L (ref 7–16)
AST SERPL-CCNC: 13 U/L (ref 0–31)
BASOPHILS ABSOLUTE: 0.02 E9/L (ref 0–0.2)
BASOPHILS RELATIVE PERCENT: 0.3 % (ref 0–2)
BILIRUB SERPL-MCNC: 0.5 MG/DL (ref 0–1.2)
BUN BLDV-MCNC: 9 MG/DL (ref 6–20)
CALCIUM SERPL-MCNC: 9.3 MG/DL (ref 8.6–10.2)
CHLORIDE BLD-SCNC: 107 MMOL/L (ref 98–107)
CHOLESTEROL, TOTAL: 178 MG/DL (ref 0–199)
CO2: 23 MMOL/L (ref 22–29)
CREAT SERPL-MCNC: 0.8 MG/DL (ref 0.5–1)
EOSINOPHILS ABSOLUTE: 0.12 E9/L (ref 0.05–0.5)
EOSINOPHILS RELATIVE PERCENT: 1.7 % (ref 0–6)
GFR AFRICAN AMERICAN: >60
GFR NON-AFRICAN AMERICAN: >60 ML/MIN/1.73
GLUCOSE BLD-MCNC: 88 MG/DL (ref 74–99)
HCT VFR BLD CALC: 39.3 % (ref 34–48)
HDLC SERPL-MCNC: 48 MG/DL
HEMOGLOBIN: 13.5 G/DL (ref 11.5–15.5)
IMMATURE GRANULOCYTES #: 0.01 E9/L
IMMATURE GRANULOCYTES %: 0.1 % (ref 0–5)
LDL CHOLESTEROL CALCULATED: 110 MG/DL (ref 0–99)
LYMPHOCYTES ABSOLUTE: 2.37 E9/L (ref 1.5–4)
LYMPHOCYTES RELATIVE PERCENT: 32.6 % (ref 20–42)
MCH RBC QN AUTO: 31.3 PG (ref 26–35)
MCHC RBC AUTO-ENTMCNC: 34.4 % (ref 32–34.5)
MCV RBC AUTO: 91.2 FL (ref 80–99.9)
MONOCYTES ABSOLUTE: 0.42 E9/L (ref 0.1–0.95)
MONOCYTES RELATIVE PERCENT: 5.8 % (ref 2–12)
NEUTROPHILS ABSOLUTE: 4.32 E9/L (ref 1.8–7.3)
NEUTROPHILS RELATIVE PERCENT: 59.5 % (ref 43–80)
PDW BLD-RTO: 11.5 FL (ref 11.5–15)
PLATELET # BLD: 331 E9/L (ref 130–450)
PMV BLD AUTO: 10.5 FL (ref 7–12)
POTASSIUM SERPL-SCNC: 4.2 MMOL/L (ref 3.5–5)
RBC # BLD: 4.31 E12/L (ref 3.5–5.5)
SODIUM BLD-SCNC: 142 MMOL/L (ref 132–146)
T4 FREE: 1.21 NG/DL (ref 0.93–1.7)
TOTAL PROTEIN: 7.3 G/DL (ref 6.4–8.3)
TRIGL SERPL-MCNC: 101 MG/DL (ref 0–149)
TSH SERPL DL<=0.05 MIU/L-ACNC: 0.85 UIU/ML (ref 0.27–4.2)
VITAMIN D 25-HYDROXY: 30 NG/ML (ref 30–100)
VLDLC SERPL CALC-MCNC: 20 MG/DL
WBC # BLD: 7.3 E9/L (ref 4.5–11.5)

## 2020-08-08 PROCEDURE — 80061 LIPID PANEL: CPT

## 2020-08-08 PROCEDURE — 85025 COMPLETE CBC W/AUTO DIFF WBC: CPT

## 2020-08-08 PROCEDURE — 84439 ASSAY OF FREE THYROXINE: CPT

## 2020-08-08 PROCEDURE — 84443 ASSAY THYROID STIM HORMONE: CPT

## 2020-08-08 PROCEDURE — 83516 IMMUNOASSAY NONANTIBODY: CPT

## 2020-08-08 PROCEDURE — 80053 COMPREHEN METABOLIC PANEL: CPT

## 2020-08-08 PROCEDURE — 82306 VITAMIN D 25 HYDROXY: CPT

## 2020-08-08 PROCEDURE — 36415 COLL VENOUS BLD VENIPUNCTURE: CPT

## 2020-08-11 ENCOUNTER — OFFICE VISIT (OUTPATIENT)
Dept: FAMILY MEDICINE CLINIC | Age: 35
End: 2020-08-11
Payer: COMMERCIAL

## 2020-08-11 VITALS
HEART RATE: 108 BPM | RESPIRATION RATE: 18 BRPM | OXYGEN SATURATION: 98 % | SYSTOLIC BLOOD PRESSURE: 116 MMHG | WEIGHT: 250.2 LBS | TEMPERATURE: 97.6 F | BODY MASS INDEX: 37.06 KG/M2 | HEIGHT: 69 IN | DIASTOLIC BLOOD PRESSURE: 72 MMHG

## 2020-08-11 LAB — TISSUE TRANSGLUTAMINASE IGA: <2 U/ML (ref 0–3)

## 2020-08-11 PROCEDURE — 99214 OFFICE O/P EST MOD 30 MIN: CPT | Performed by: FAMILY MEDICINE

## 2020-08-11 PROCEDURE — G8427 DOCREV CUR MEDS BY ELIG CLIN: HCPCS | Performed by: FAMILY MEDICINE

## 2020-08-11 PROCEDURE — 1036F TOBACCO NON-USER: CPT | Performed by: FAMILY MEDICINE

## 2020-08-11 PROCEDURE — G8417 CALC BMI ABV UP PARAM F/U: HCPCS | Performed by: FAMILY MEDICINE

## 2020-08-11 RX ORDER — TIZANIDINE 4 MG/1
4 TABLET ORAL NIGHTLY PRN
Qty: 10 TABLET | Refills: 0 | Status: SHIPPED
Start: 2020-08-11 | End: 2020-10-19

## 2020-08-11 RX ORDER — LEVOCETIRIZINE DIHYDROCHLORIDE 5 MG/1
5 TABLET, FILM COATED ORAL NIGHTLY
Qty: 30 TABLET | Refills: 1 | Status: SHIPPED
Start: 2020-08-11 | End: 2020-10-19

## 2020-08-11 NOTE — PATIENT INSTRUCTIONS
Take Ibuprofen scheduled 3 times per day for the next 3-5 days. If the Xyzal isn't covered, then generic claritin daily would be OK--should be less drowsy than zyrtec.

## 2020-08-11 NOTE — PROGRESS NOTES
Subjective:  37 y.o. y/o female is here today with complaints of left shoulder/neck pain. Whole body feels like bugs crawling on her, couple weeks, tried moisturizing and drinking more water, nothing makes worse, better while in shower, no changes in soaps/detergents, +known sensitive skin  Flonase every day  Tried Zyrtec--didn't help  More so on arms than legs  No rash  Recent labs unremarkable  Only using the flonase, stopped other meds    Left shoulder blade, felt like pop couple days ago, +heat/ice/Icy Hot/ibuprofen (nothing helped)  Feels like 20lb weight on shoulders, +headache  Trouble sleeping last night  Radiates down back and up neck  Lying down makes worse, +searing pain  +pressure when sitting up  Twisting to right OK, +jolting pain with twist to left  No fall    Labs recently done, reviewed  Ob/gyn, U/S and pap smear scheduled 8/24, Dr. Gracia Burciaga    Patient's past medical, surgical, social and/or family history reviewed, updated in chart, and are non-contributory (unless otherwise stated). Medications and allergies also reviewed and updated in chart.       Review of Systems:  Constitutional:  No fever, + fatigue (decreased motivation), no chills, + headaches, + weight change  Dermatology:  No rash, no mole, + dry or sensitive skin  ENT:  No cough, no sore throat, no sinus pain, no runny nose, no ear pain  Cardiology:  No chest pain, + palpitations (with activity), no leg edema, no shortness of breath, no PND  Gastroenterology:  No dysphagia, no abdominal pain, no nausea, no vomiting, no constipation, + diarrhea (oily and mushy), no heartburn  Musculoskeletal:  + joint pain, no leg cramps, + back pain, + muscle aches  Respiratory:  No shortness of breath, no orthopnea, no wheezing, no MARKS  Urology:  No blood in the urine, no urinary frequency, no urinary incontinence, no urinary urgency, no nocturia, no dysuria    Vitals:    08/11/20 1153   BP: 116/72   Site: Right Upper Arm   Position: Sitting   Cuff Size: Large Adult   Pulse: 108   Resp: 18   Temp: 97.6 °F (36.4 °C)   TempSrc: Temporal   SpO2: 98%   Weight: 250 lb 3.2 oz (113.5 kg)   Height: 5' 8.5\" (1.74 m)     Body mass index is 37.49 kg/m². General:  Patient alert and oriented x 3, NAD, pleasant  HEENT:  Atraumatic, normocephalic, PERRLA, EOMI, clear conjunctiva, +mask  Neck:  Supple, no goiter, no carotid bruits, no LAD  Lungs:  CTA B, symmetric breath sounds, no resp distress  Heart:  RRR, no murmurs, gallops or rubs  Musculoskeletal:  +central distal cervical tenderness, +left trap tenderness and spasm, +decreased ROM of neck, strength 5/5 UE b/l, DTRs 2+ UE b/l  Extremities:  No clubbing, cyanosis or edema  Skin: unremarkable    Assessment/Plan:      Abdi Reynolds was seen today for shoulder pain and neck pain. Diagnoses and all orders for this visit:    Trapezius strain, left, initial encounter  -     tiZANidine (ZANAFLEX) 4 MG tablet; Take 1 tablet by mouth nightly as needed (muscle spasm)  -     [facility], [place], [facility]  + Scheduled NSAIDs for 3-5 days  + Supportive therapy    Strain of neck muscle, initial encounter  -     tiZANidine (ZANAFLEX) 4 MG tablet; Take 1 tablet by mouth nightly as needed (muscle spasm)  -     [facility], [place], [facility]    Generalized pruritus  -     levocetirizine (XYZAL) 5 MG tablet; Take 1 tablet by mouth nightly  + Etiology unknown, thyroid function normal, liver function normal  + No rash  + Will do trial of daily antihistamine--unable to tolerate zyrtec d/t drowsiness      As above. Call or go to ED immediately if symptoms worsen or persist.  Return in 2 weeks (on 8/25/2020). as already scheduled, or sooner if necessary. Educational materials and/or home exercises printed for patient's review and were included in patient instructions on his/her After Visit Summary and given to patient at the end of visit.       Counseled regarding above diagnosis, including possible risks and complications,  especially if left uncontrolled. Counseled regarding the possible side effects, risks, benefits and alternatives to treatment; patient and/or guardian verbalizes understanding, agrees, feels comfortable with and wishes to proceed with above treatment plan. Advised patient to call with any new medication issues, and read all Rx info from pharmacy to assure aware of all possible risks and side effects of medication before taking. Reviewed age and gender appropriate health screening exams and vaccinations. Advised patient regarding importance of keeping up with recommended health maintenance and to schedule as soon as possible if overdue, as this is important in assessing for undiagnosed pathology, especially cancer, as well as protecting against potentially harmful/life threatening disease. Patient and/or guardian verbalizes understanding and agrees with above counseling, assessment and plan. All questions answered.

## 2020-08-24 ENCOUNTER — OFFICE VISIT (OUTPATIENT)
Dept: FAMILY MEDICINE CLINIC | Age: 35
End: 2020-08-24
Payer: COMMERCIAL

## 2020-08-24 VITALS
RESPIRATION RATE: 18 BRPM | TEMPERATURE: 96.8 F | SYSTOLIC BLOOD PRESSURE: 134 MMHG | BODY MASS INDEX: 36.85 KG/M2 | WEIGHT: 248.8 LBS | HEART RATE: 76 BPM | OXYGEN SATURATION: 98 % | DIASTOLIC BLOOD PRESSURE: 84 MMHG | HEIGHT: 69 IN

## 2020-08-24 PROCEDURE — 1036F TOBACCO NON-USER: CPT | Performed by: FAMILY MEDICINE

## 2020-08-24 PROCEDURE — G8427 DOCREV CUR MEDS BY ELIG CLIN: HCPCS | Performed by: FAMILY MEDICINE

## 2020-08-24 PROCEDURE — 99214 OFFICE O/P EST MOD 30 MIN: CPT | Performed by: FAMILY MEDICINE

## 2020-08-24 PROCEDURE — G8417 CALC BMI ABV UP PARAM F/U: HCPCS | Performed by: FAMILY MEDICINE

## 2020-08-24 RX ORDER — FLUTICASONE PROPIONATE 50 MCG
SPRAY, SUSPENSION (ML) NASAL
Qty: 1 BOTTLE | Refills: 3 | Status: SHIPPED
Start: 2020-08-24 | End: 2021-02-23 | Stop reason: SDUPTHER

## 2020-08-24 RX ORDER — DULOXETIN HYDROCHLORIDE 20 MG/1
20 CAPSULE, DELAYED RELEASE ORAL DAILY
Qty: 30 CAPSULE | Refills: 3 | Status: ON HOLD
Start: 2020-08-24 | End: 2020-10-22 | Stop reason: HOSPADM

## 2020-08-24 RX ORDER — CETIRIZINE HYDROCHLORIDE 10 MG/1
10 TABLET ORAL DAILY
COMMUNITY
End: 2022-08-15

## 2020-08-24 NOTE — PROGRESS NOTES
Subjective:  37 y.o. y/o female is here today for f/u left shoulder/neck pain, puritis. Whole body feels like bugs crawling on her, couple weeks, tried moisturizing and drinking more water, nothing makes worse, better while in shower, no changes in soaps/detergents, +known sensitive skin  Flonase every day  Tried Zyrtec--didn't help  More so on arms than legs  No rash  Recent labs unremarkable  Only using the flonase, stopped other meds  Tiny bursts like fireworks  Moreso arms vs legs  Zoo yesterday, feels exhausted from walking around, legs still hurting    Question of fibromyalgia previously  +anxiety and depression  Not been on cymbalta, insurance issues, didn't help with motivation/energy    Left shoulder blade pain  Went to massage therapist, feeling 90% better, full ROM    Labs recently done, reviewed  GI, Dr. Nohemy Kim, U/S and pap smear done today, Dr. Lana Mancera  IUDENISHA planned    Working out and watching diet carefully, unable to lose weight    Patient's past medical, surgical, social and/or family history reviewed, updated in chart, and are non-contributory (unless otherwise stated). Medications and allergies also reviewed and updated in chart.       Review of Systems:  Constitutional:  No fever, + fatigue (decreased motivation), no chills, + headaches, + weight change  Dermatology:  No rash, no mole, + dry or sensitive skin  ENT:  No cough, no sore throat, no sinus pain, no runny nose, no ear pain  Cardiology:  No chest pain, + palpitations (with activity), no leg edema, no shortness of breath, no PND  Gastroenterology:  No dysphagia, no abdominal pain, no nausea, no vomiting, no constipation, + diarrhea (oily and mushy), no heartburn  Musculoskeletal:  + joint pain, no leg cramps, + back pain, + muscle aches  Respiratory:  No shortness of breath, no orthopnea, no wheezing, no MARKS  Urology:  No blood in the urine, no urinary frequency, no urinary incontinence, no urinary urgency, no nocturia, no dysuria    Vitals:    08/24/20 1144   BP: 134/84   Site: Left Upper Arm   Position: Sitting   Cuff Size: Large Adult   Pulse: 76   Resp: 18   Temp: 96.8 °F (36 °C)   TempSrc: Temporal   SpO2: 98%   Weight: 248 lb 12.8 oz (112.9 kg)   Height: 5' 8.5\" (1.74 m)     Body mass index is 37.28 kg/m². General:  Patient alert and oriented x 3, NAD, pleasant  HEENT:  Atraumatic, normocephalic, PERRLA, EOMI, clear conjunctiva, +mask  Neck:  Supple, no goiter, no carotid bruits, no LAD  Lungs:  CTA B, symmetric breath sounds, no resp distress  Heart:  RRR, no murmurs, gallops or rubs  Extremities:  No clubbing, cyanosis or edema  Skin: unremarkable    Assessment/Plan:    Virgilio Boswell was seen today for pruritis, neck pain and shoulder pain. Diagnoses and all orders for this visit:    Generalized pruritus  -     DULoxetine (CYMBALTA) 20 MG extended release capsule; Take 1 capsule by mouth daily  + Will do trial of cymbalta--sensation may be related to fibromyalgia    Chronic rhinitis  -     fluticasone (FLONASE) 50 MCG/ACT nasal spray; INSTILL 1 SPRAY BY NASAL ROUTE DAILY    Strain of left trapezius muscle, subsequent encounter  Much improved, continue massage as needed    Anxiety and depression  -     DULoxetine (CYMBALTA) 20 MG extended release capsule; Take 1 capsule by mouth daily  + Will restart cymbalta and then reevaluate in 1 month    Gave information for \"Weight Loss for [employer]\"--continue to work on diet and regular exercise. As above. Call or go to ED immediately if symptoms worsen or persist.  Return in about 4 weeks (around 9/21/2020). , or sooner if necessary. Spent 25 minutes with patient of which greater than 50% was spent counseling regarding the above issues. Educational materials and/or home exercises printed for patient's review and were included in patient instructions on his/her After Visit Summary and given to patient at the end of visit.       Counseled regarding above diagnosis, including possible risks and complications,  especially if left uncontrolled. Counseled regarding the possible side effects, risks, benefits and alternatives to treatment; patient and/or guardian verbalizes understanding, agrees, feels comfortable with and wishes to proceed with above treatment plan. Advised patient to call with any new medication issues, and read all Rx info from pharmacy to assure aware of all possible risks and side effects of medication before taking. Reviewed age and gender appropriate health screening exams and vaccinations. Advised patient regarding importance of keeping up with recommended health maintenance and to schedule as soon as possible if overdue, as this is important in assessing for undiagnosed pathology, especially cancer, as well as protecting against potentially harmful/life threatening disease. Patient and/or guardian verbalizes understanding and agrees with above counseling, assessment and plan. All questions answered.

## 2020-10-19 ENCOUNTER — OFFICE VISIT (OUTPATIENT)
Dept: FAMILY MEDICINE CLINIC | Age: 35
End: 2020-10-19
Payer: COMMERCIAL

## 2020-10-19 ENCOUNTER — HOSPITAL ENCOUNTER (INPATIENT)
Age: 35
LOS: 3 days | Discharge: HOME | DRG: 753 | End: 2020-10-22
Attending: PSYCHIATRY & NEUROLOGY | Admitting: PSYCHIATRY & NEUROLOGY
Payer: COMMERCIAL

## 2020-10-19 ENCOUNTER — TELEPHONE (OUTPATIENT)
Dept: FAMILY MEDICINE CLINIC | Age: 35
End: 2020-10-19

## 2020-10-19 ENCOUNTER — HOSPITAL ENCOUNTER (EMERGENCY)
Age: 35
Discharge: HOME | End: 2020-10-19
Attending: EMERGENCY MEDICINE
Payer: COMMERCIAL

## 2020-10-19 VITALS
HEIGHT: 69 IN | WEIGHT: 255.2 LBS | HEART RATE: 90 BPM | RESPIRATION RATE: 18 BRPM | BODY MASS INDEX: 37.8 KG/M2 | DIASTOLIC BLOOD PRESSURE: 84 MMHG | TEMPERATURE: 97.1 F | OXYGEN SATURATION: 99 % | SYSTOLIC BLOOD PRESSURE: 134 MMHG

## 2020-10-19 VITALS
HEIGHT: 63 IN | DIASTOLIC BLOOD PRESSURE: 88 MMHG | TEMPERATURE: 97.4 F | OXYGEN SATURATION: 96 % | HEART RATE: 96 BPM | BODY MASS INDEX: 45.18 KG/M2 | RESPIRATION RATE: 16 BRPM | WEIGHT: 255 LBS | SYSTOLIC BLOOD PRESSURE: 135 MMHG

## 2020-10-19 PROBLEM — F33.9 MAJOR DEPRESSION, RECURRENT (HCC): Status: ACTIVE | Noted: 2020-10-19

## 2020-10-19 LAB
ACETAMINOPHEN LEVEL: <5 MCG/ML (ref 10–30)
ALBUMIN SERPL-MCNC: 4.1 G/DL (ref 3.5–5.2)
ALP BLD-CCNC: 54 U/L (ref 35–104)
ALT SERPL-CCNC: 15 U/L (ref 0–32)
AMPHETAMINE SCREEN, URINE: NOT DETECTED
ANION GAP SERPL CALCULATED.3IONS-SCNC: 8 MMOL/L (ref 7–16)
AST SERPL-CCNC: 15 U/L (ref 0–31)
BARBITURATE SCREEN URINE: NOT DETECTED
BASOPHILS ABSOLUTE: 0.05 E9/L (ref 0–0.2)
BASOPHILS RELATIVE PERCENT: 0.5 % (ref 0–2)
BENZODIAZEPINE SCREEN, URINE: NOT DETECTED
BILIRUB SERPL-MCNC: 0.2 MG/DL (ref 0–1.2)
BILIRUBIN URINE: NEGATIVE
BLOOD, URINE: NEGATIVE
BUN BLDV-MCNC: 10 MG/DL (ref 6–20)
CALCIUM SERPL-MCNC: 9.4 MG/DL (ref 8.6–10.2)
CANNABINOID SCREEN URINE: NOT DETECTED
CHLORIDE BLD-SCNC: 101 MMOL/L (ref 98–107)
CLARITY: CLEAR
CO2: 26 MMOL/L (ref 22–29)
COCAINE METABOLITE SCREEN URINE: NOT DETECTED
COLOR: YELLOW
CREAT SERPL-MCNC: 0.7 MG/DL (ref 0.5–1)
EKG ATRIAL RATE: 102 BPM
EKG P AXIS: 54 DEGREES
EKG P-R INTERVAL: 140 MS
EKG Q-T INTERVAL: 350 MS
EKG QRS DURATION: 82 MS
EKG QTC CALCULATION (BAZETT): 456 MS
EKG R AXIS: 49 DEGREES
EKG T AXIS: 23 DEGREES
EKG VENTRICULAR RATE: 102 BPM
EOSINOPHILS ABSOLUTE: 0.22 E9/L (ref 0.05–0.5)
EOSINOPHILS RELATIVE PERCENT: 2 % (ref 0–6)
ETHANOL: <10 MG/DL (ref 0–0.08)
FENTANYL SCREEN, URINE: NOT DETECTED
GFR AFRICAN AMERICAN: >60
GFR NON-AFRICAN AMERICAN: >60 ML/MIN/1.73
GLUCOSE BLD-MCNC: 117 MG/DL (ref 74–99)
GLUCOSE URINE: NEGATIVE MG/DL
HCG, URINE, POC: NEGATIVE
HCT VFR BLD CALC: 41.2 % (ref 34–48)
HEMOGLOBIN: 13.9 G/DL (ref 11.5–15.5)
IMMATURE GRANULOCYTES #: 0.03 E9/L
IMMATURE GRANULOCYTES %: 0.3 % (ref 0–5)
KETONES, URINE: NEGATIVE MG/DL
LEUKOCYTE ESTERASE, URINE: NEGATIVE
LYMPHOCYTES ABSOLUTE: 2.62 E9/L (ref 1.5–4)
LYMPHOCYTES RELATIVE PERCENT: 23.9 % (ref 20–42)
Lab: NORMAL
Lab: NORMAL
MCH RBC QN AUTO: 30.9 PG (ref 26–35)
MCHC RBC AUTO-ENTMCNC: 33.7 % (ref 32–34.5)
MCV RBC AUTO: 91.6 FL (ref 80–99.9)
METHADONE SCREEN, URINE: NOT DETECTED
MONOCYTES ABSOLUTE: 0.55 E9/L (ref 0.1–0.95)
MONOCYTES RELATIVE PERCENT: 5 % (ref 2–12)
NEGATIVE QC PASS/FAIL: NORMAL
NEUTROPHILS ABSOLUTE: 7.51 E9/L (ref 1.8–7.3)
NEUTROPHILS RELATIVE PERCENT: 68.3 % (ref 43–80)
NITRITE, URINE: NEGATIVE
OPIATE SCREEN URINE: NOT DETECTED
OXYCODONE URINE: NOT DETECTED
PDW BLD-RTO: 11.9 FL (ref 11.5–15)
PH UA: 6.5 (ref 5–9)
PHENCYCLIDINE SCREEN URINE: NOT DETECTED
PLATELET # BLD: 325 E9/L (ref 130–450)
PMV BLD AUTO: 9.7 FL (ref 7–12)
POSITIVE QC PASS/FAIL: NORMAL
POTASSIUM SERPL-SCNC: 3.6 MMOL/L (ref 3.5–5)
PROTEIN UA: NEGATIVE MG/DL
RBC # BLD: 4.5 E12/L (ref 3.5–5.5)
SALICYLATE, SERUM: <0.3 MG/DL (ref 0–30)
SARS-COV-2, NAAT: NOT DETECTED
SODIUM BLD-SCNC: 135 MMOL/L (ref 132–146)
SPECIFIC GRAVITY UA: 1.01 (ref 1–1.03)
TOTAL PROTEIN: 7.3 G/DL (ref 6.4–8.3)
TRICYCLIC ANTIDEPRESSANTS SCREEN SERUM: NEGATIVE NG/ML
UROBILINOGEN, URINE: 0.2 E.U./DL
WBC # BLD: 11 E9/L (ref 4.5–11.5)

## 2020-10-19 PROCEDURE — 93010 ELECTROCARDIOGRAM REPORT: CPT | Performed by: INTERNAL MEDICINE

## 2020-10-19 PROCEDURE — 96160 PT-FOCUSED HLTH RISK ASSMT: CPT | Performed by: FAMILY MEDICINE

## 2020-10-19 PROCEDURE — 6370000000 HC RX 637 (ALT 250 FOR IP): Performed by: PSYCHIATRY & NEUROLOGY

## 2020-10-19 PROCEDURE — 80307 DRUG TEST PRSMV CHEM ANLYZR: CPT

## 2020-10-19 PROCEDURE — 1240000000 HC EMOTIONAL WELLNESS R&B

## 2020-10-19 PROCEDURE — G0480 DRUG TEST DEF 1-7 CLASSES: HCPCS

## 2020-10-19 PROCEDURE — G8417 CALC BMI ABV UP PARAM F/U: HCPCS | Performed by: FAMILY MEDICINE

## 2020-10-19 PROCEDURE — 99285 EMERGENCY DEPT VISIT HI MDM: CPT

## 2020-10-19 PROCEDURE — 99214 OFFICE O/P EST MOD 30 MIN: CPT | Performed by: FAMILY MEDICINE

## 2020-10-19 PROCEDURE — 99284 EMERGENCY DEPT VISIT MOD MDM: CPT

## 2020-10-19 PROCEDURE — U0002 COVID-19 LAB TEST NON-CDC: HCPCS

## 2020-10-19 PROCEDURE — G8484 FLU IMMUNIZE NO ADMIN: HCPCS | Performed by: FAMILY MEDICINE

## 2020-10-19 PROCEDURE — 85025 COMPLETE CBC W/AUTO DIFF WBC: CPT

## 2020-10-19 PROCEDURE — G8427 DOCREV CUR MEDS BY ELIG CLIN: HCPCS | Performed by: FAMILY MEDICINE

## 2020-10-19 PROCEDURE — 81003 URINALYSIS AUTO W/O SCOPE: CPT

## 2020-10-19 PROCEDURE — 80053 COMPREHEN METABOLIC PANEL: CPT

## 2020-10-19 PROCEDURE — 1036F TOBACCO NON-USER: CPT | Performed by: FAMILY MEDICINE

## 2020-10-19 PROCEDURE — 93005 ELECTROCARDIOGRAM TRACING: CPT | Performed by: PHYSICIAN ASSISTANT

## 2020-10-19 RX ORDER — HALOPERIDOL 5 MG
5 TABLET ORAL EVERY 6 HOURS PRN
Status: DISCONTINUED | OUTPATIENT
Start: 2020-10-19 | End: 2020-10-22 | Stop reason: HOSPADM

## 2020-10-19 RX ORDER — HYDROXYZINE PAMOATE 50 MG/1
50 CAPSULE ORAL 3 TIMES DAILY PRN
Status: DISCONTINUED | OUTPATIENT
Start: 2020-10-19 | End: 2020-10-22 | Stop reason: HOSPADM

## 2020-10-19 RX ORDER — TRAZODONE HYDROCHLORIDE 50 MG/1
50 TABLET ORAL NIGHTLY PRN
Status: DISCONTINUED | OUTPATIENT
Start: 2020-10-19 | End: 2020-10-22 | Stop reason: HOSPADM

## 2020-10-19 RX ORDER — HALOPERIDOL 5 MG/ML
5 INJECTION INTRAMUSCULAR EVERY 6 HOURS PRN
Status: DISCONTINUED | OUTPATIENT
Start: 2020-10-19 | End: 2020-10-22 | Stop reason: HOSPADM

## 2020-10-19 RX ORDER — MAGNESIUM HYDROXIDE/ALUMINUM HYDROXICE/SIMETHICONE 120; 1200; 1200 MG/30ML; MG/30ML; MG/30ML
30 SUSPENSION ORAL PRN
Status: DISCONTINUED | OUTPATIENT
Start: 2020-10-19 | End: 2020-10-22 | Stop reason: HOSPADM

## 2020-10-19 RX ORDER — ACETAMINOPHEN 325 MG/1
650 TABLET ORAL EVERY 6 HOURS PRN
Status: DISCONTINUED | OUTPATIENT
Start: 2020-10-19 | End: 2020-10-22 | Stop reason: HOSPADM

## 2020-10-19 RX ADMIN — HYDROXYZINE PAMOATE 50 MG: 50 CAPSULE ORAL at 20:53

## 2020-10-19 RX ADMIN — TRAZODONE HYDROCHLORIDE 50 MG: 50 TABLET ORAL at 20:53

## 2020-10-19 RX ADMIN — ACETAMINOPHEN 650 MG: 325 TABLET, FILM COATED ORAL at 20:53

## 2020-10-19 ASSESSMENT — PATIENT HEALTH QUESTIONNAIRE - PHQ9
7. TROUBLE CONCENTRATING ON THINGS, SUCH AS READING THE NEWSPAPER OR WATCHING TELEVISION: 3
1. LITTLE INTEREST OR PLEASURE IN DOING THINGS: 3
3. TROUBLE FALLING OR STAYING ASLEEP: 1
SUM OF ALL RESPONSES TO PHQ9 QUESTIONS 1 & 2: 6
2. FEELING DOWN, DEPRESSED OR HOPELESS: 3
4. FEELING TIRED OR HAVING LITTLE ENERGY: 3
10. IF YOU CHECKED OFF ANY PROBLEMS, HOW DIFFICULT HAVE THESE PROBLEMS MADE IT FOR YOU TO DO YOUR WORK, TAKE CARE OF THINGS AT HOME, OR GET ALONG WITH OTHER PEOPLE: 2
SUM OF ALL RESPONSES TO PHQ QUESTIONS 1-9: 15
SUM OF ALL RESPONSES TO PHQ QUESTIONS 1-9: 19
8. MOVING OR SPEAKING SO SLOWLY THAT OTHER PEOPLE COULD HAVE NOTICED. OR THE OPPOSITE, BEING SO FIGETY OR RESTLESS THAT YOU HAVE BEEN MOVING AROUND A LOT MORE THAN USUAL: 3
SUM OF ALL RESPONSES TO PHQ QUESTIONS 1-9: 22
SUM OF ALL RESPONSES TO PHQ QUESTIONS 1-9: 22
6. FEELING BAD ABOUT YOURSELF - OR THAT YOU ARE A FAILURE OR HAVE LET YOURSELF OR YOUR FAMILY DOWN: 3
9. THOUGHTS THAT YOU WOULD BE BETTER OFF DEAD, OR OF HURTING YOURSELF: 3

## 2020-10-19 ASSESSMENT — COLUMBIA-SUICIDE SEVERITY RATING SCALE - C-SSRS
6. HAVE YOU EVER DONE ANYTHING, STARTED TO DO ANYTHING, OR PREPARED TO DO ANYTHING TO END YOUR LIFE?: NO
4. HAVE YOU HAD THESE THOUGHTS AND HAD SOME INTENTION OF ACTING ON THEM?: YES
3. HAVE YOU BEEN THINKING ABOUT HOW YOU MIGHT KILL YOURSELF?: YES
2. HAVE YOU ACTUALLY HAD ANY THOUGHTS OF KILLING YOURSELF?: YES
5. HAVE YOU STARTED TO WORK OUT OR WORKED OUT THE DETAILS OF HOW TO KILL YOURSELF? DO YOU INTEND TO CARRY OUT THIS PLAN?: YES
1. WITHIN THE PAST MONTH, HAVE YOU WISHED YOU WERE DEAD OR WISHED YOU COULD GO TO SLEEP AND NOT WAKE UP?: YES

## 2020-10-19 ASSESSMENT — SLEEP AND FATIGUE QUESTIONNAIRES
DIFFICULTY STAYING ASLEEP: YES
AVERAGE NUMBER OF SLEEP HOURS: 4
DO YOU HAVE DIFFICULTY SLEEPING: YES
DO YOU USE A SLEEP AID: NO
DIFFICULTY FALLING ASLEEP: YES
DIFFICULTY ARISING: YES
SLEEP PATTERN: DIFFICULTY FALLING ASLEEP;INSOMNIA
RESTFUL SLEEP: NO

## 2020-10-19 ASSESSMENT — PAIN SCALES - GENERAL
PAINLEVEL_OUTOF10: 0
PAINLEVEL_OUTOF10: 7

## 2020-10-19 ASSESSMENT — LIFESTYLE VARIABLES: HISTORY_ALCOHOL_USE: NO

## 2020-10-19 NOTE — TELEPHONE ENCOUNTER
Patient left message c/o increased mood swings/emotional instability and suicidal thoughts. Has been taking Cymbalta for about 2 months. Asking what she can do/suggestions.  She is scheduled for an office visit this afternoon

## 2020-10-19 NOTE — ED NOTES
Purse and keys given to step father Jemal Duff per patient request.  Clothing, cell phone, and  with patient.       Stanley Cruz RN  10/19/20 [facility] Ani Burrows RN  10/19/20 8582

## 2020-10-19 NOTE — PROGRESS NOTES
Met with patient and PCP to engage in risk assessment and management. Patient disclosed daily suicidal ideation for the past two weeks. She reported occasional having thoughts involving plans of action. She reported her last thought involving a plan was yesterday. She denied acting on any such thoughts. She disclosed a major change for her was not wanting to  her children from their father's home, as she describer her children as being her life. She reported reaching out to support system to notify them that she may need to be hospitalized and acknowledged the need for treatment. She agreed to a hospital evaluation and walked over with staff to the ER. Psychologist discussed the case and current situation with attending physician in the ER, Dr. Tyree Gimenez, to coordinate care.

## 2020-10-19 NOTE — ED NOTES
[facility] aware of patient need for psych bed. Patient case to be reviewed for admission to .      Leonce Sandifer, MSW, [place]  10/19/20 8782
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
## 2020-10-19 NOTE — CARE COORDINATION
Social Work/Transition of Care:    Pt medically cleared, SW called the [facility] spoke to Jason Lay awaiting call back after [facility] reviews current bed availability. Electronically signed by Paty Ochoa on 17/01/2893 at 4:14 PM     Pt accepted to the [facility] by Dr. Padmini Coleman awaiting Room assignment. [facility] called for transport once bed assigned. [place] pass ED Nurse updated.     Electronically signed by Paty Ochoa on 48/73/7563 at 6:25 PM

## 2020-10-19 NOTE — ED NOTES
Patient states she had a plan of turning on her car in the garage to commit suicide. Patient states at this point she did not have intent to act on this plan. Patient states she had started to get things in order for her children to be taken care of. Patient is tearful and anxious but cooperative.         Adelaide Patterson RN  10/19/20 0436

## 2020-10-20 PROBLEM — F31.81 BIPOLAR II DISORDER, MOST RECENT EPISODE HYPOMANIC (HCC): Status: ACTIVE | Noted: 2020-10-20

## 2020-10-20 PROCEDURE — 1240000000 HC EMOTIONAL WELLNESS R&B

## 2020-10-20 PROCEDURE — 99222 1ST HOSP IP/OBS MODERATE 55: CPT | Performed by: NURSE PRACTITIONER

## 2020-10-20 PROCEDURE — 6370000000 HC RX 637 (ALT 250 FOR IP): Performed by: NURSE PRACTITIONER

## 2020-10-20 PROCEDURE — 6370000000 HC RX 637 (ALT 250 FOR IP): Performed by: PSYCHIATRY & NEUROLOGY

## 2020-10-20 RX ORDER — OXCARBAZEPINE 150 MG/1
150 TABLET, FILM COATED ORAL 2 TIMES DAILY
Status: DISCONTINUED | OUTPATIENT
Start: 2020-10-20 | End: 2020-10-21

## 2020-10-20 RX ADMIN — ACETAMINOPHEN 650 MG: 325 TABLET, FILM COATED ORAL at 18:18

## 2020-10-20 RX ADMIN — OXCARBAZEPINE 150 MG: 150 TABLET, FILM COATED ORAL at 21:10

## 2020-10-20 RX ADMIN — TRAZODONE HYDROCHLORIDE 50 MG: 50 TABLET ORAL at 21:10

## 2020-10-20 ASSESSMENT — SLEEP AND FATIGUE QUESTIONNAIRES
AVERAGE NUMBER OF SLEEP HOURS: 4
DIFFICULTY STAYING ASLEEP: YES
RESTFUL SLEEP: NO
DO YOU USE A SLEEP AID: NO
SLEEP PATTERN: DIFFICULTY FALLING ASLEEP
DO YOU HAVE DIFFICULTY SLEEPING: YES
DIFFICULTY FALLING ASLEEP: YES

## 2020-10-20 ASSESSMENT — PAIN SCALES - GENERAL
PAINLEVEL_OUTOF10: 5
PAINLEVEL_OUTOF10: 0

## 2020-10-20 ASSESSMENT — LIFESTYLE VARIABLES: HISTORY_ALCOHOL_USE: NO

## 2020-10-20 ASSESSMENT — PATIENT HEALTH QUESTIONNAIRE - PHQ9: SUM OF ALL RESPONSES TO PHQ QUESTIONS 1-9: 16

## 2020-10-20 NOTE — PROGRESS NOTES
Attended community meeting shared goal for the day as to get acclimated to the floor. Recreation assessment completed.

## 2020-10-20 NOTE — PLAN OF CARE
Pt is stable and without distress. Pt denies suicidal or homicidal ideations. Pt is cooperative. Pt denies hallucinations. Pt does not report a goal presently. Will follow and monitor.
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
## 2020-10-20 NOTE — BH NOTE
[facility]  Initial Interdisciplinary Treatment Plan NOTE    Review Date & Time: 10-20-20   1000a    Patient was in treatment team    Admission Type:   Admission Type: Involuntary    Reason for admission:  Reason for Admission: \"my pcp was concearned I was having suicidal thoughts\"      Estimated Length of Stay Update:  3-5 days  Estimated Discharge Date Update: 3-5 days    PATIENT STRENGTHS:  Patient Strengths Strengths: Communication  Patient Strengths and Limitations:Limitations: Difficulty problem solving/relies on others to help solve problems  Addictive Behavior:Addictive Behavior  In the past 3 months, have you felt or has someone told you that you have a problem with:  : None  Do you have a history of Chemical Use?: No  Do you have a history of Alcohol Use?: No  Do you have a history of Street Drug Abuse?: No  Histroy of Prescripton Drug Abuse?: No  Medical Problems:  Past Medical History:   Diagnosis Date    Arthritis     Asthma     not taking any medications    Lesion of mouth        EDUCATION:   Learner Progress Toward Treatment Goals: Reviewed results and recommendations of this team and Reviewed group plan and strategies    Method: Small group    Outcome: Verbalized understanding and Demonstrated Understanding    PATIENT GOALS: To get acclimated    PLAN/TREATMENT RECOMMENDATIONS UPDATE Begin medication regimen and assess pt responses. GOALS UPDATE:   Time frame for Short-Term Goals: Daily re assessment.      Michelle Tucker RN

## 2020-10-20 NOTE — PROGRESS NOTES
`[facility]  Admission Note     Admitted 38 y/o w/f a/o x4 single mom with 2 kids c/o depressed mood and suicidal thoughts she has been taking cymbalta 2 1/2 months prescribed by PCP feels getting worse past couple of weeks having suicidal thoughts no plan affect flat sad and depressed tearful oriented to unit and room menus faxed to bed     Admission Type:   Admission Type:  Involuntary    Reason for admission:  Reason for Admission: \"my pcp was concearned I was having suicidal thoughts\"    PATIENT STRENGTHS:  Strengths: Communication    Patient Strengths and Limitations:  Limitations: Difficulty problem solving/relies on others to help solve problems    Addictive Behavior:   Addictive Behavior  In the past 3 months, have you felt or has someone told you that you have a problem with:  : None  Do you have a history of Chemical Use?: No  Do you have a history of Alcohol Use?: No  Do you have a history of Street Drug Abuse?: No  Histroy of Prescripton Drug Abuse?: No    Medical Problems:   Past Medical History:   Diagnosis Date    Arthritis     Asthma     not taking any medications    Lesion of mouth        Status EXAM:  Status and Exam  Normal: Yes  Facial Expression: Flat, Sad  Affect: Appropriate  Level of Consciousness: Alert  Mood:Normal: No  Mood: Depressed, Anxious, Sad  Motor Activity:Normal: Yes  Interview Behavior: Cooperative  Preception: [place] to Person, [place] to Time, [place] to Place, [place] to Situation  Attention:Normal: Yes  Thought Processes: (WNL)  Thought Content:Normal: Yes  Hallucinations: None  Delusions: No  Memory:Normal: Yes  Insight and Judgment: Yes  Present Suicidal Ideation: Yes  Present Homicidal Ideation: No    Tobacco Screening:  Practical Counseling, on admission, hector X, if applicable and completed (first 3 are required if patient doesn't refuse):            ( )  Recognizing danger situations (included triggers and roadblocks)                    ( )  Coping skills (new ways to manage stress, exercise, relaxation techniques, changing routine, distraction)                                                           ( )  Basic information about quitting (benefits of quitting, techniques in how to quit, available resources  ( ) Referral for counseling faxed to [facility]                                           ( ) Patient refused counseling  ( x) Patient has not smoked in the last 30 days    Metabolic Screening:    Lab Results   Component Value Date    LABA1C 4.9 01/25/2017       Lab Results   Component Value Date    CHOL 178 08/08/2020    CHOL 183 01/25/2017    CHOL 177 12/05/2015     Lab Results   Component Value Date    TRIG 101 08/08/2020    TRIG 75 01/25/2017    TRIG 58 12/05/2015     Lab Results   Component Value Date    HDL 48 08/08/2020    HDL 54 01/27/2018    HDL 57 01/25/2017    HDL 65 12/05/2015     No components found for: LDLCAL  Lab Results   Component Value Date    LABVLDL 20 08/08/2020    LABVLDL 14 01/27/2018    LABVLDL 15 01/25/2017    LABVLDL 12 12/05/2015         Body mass index is 37.66 kg/m².     BP Readings from Last 2 Encounters:   10/19/20 (!) 136/93   10/19/20 135/88           Pt admitted with followings belongings:  Dentures: None  Vision - Corrective Lenses: Glasses  Hearing Aid: None  Jewelry: None  Body Piercings Removed: N/A  Clothing: Pants, Shirt, Undergarments (Comment), Footwear, Socks(1 blk blouse)  Were All Patient Medications Collected?: Not Applicable  Other Valuables: Cell phone( cord)                      Beronica Alegria RN

## 2020-10-20 NOTE — BH NOTE
Pt is stable, cooperative. No immediate distress presently. Pt denies suicidal or homicidal ideations. Pt denies hallucinations. Will follow and monitor.

## 2020-10-20 NOTE — GROUP NOTE
Group Therapy Note    Date: 10/20/2020    Group Start Time: 1100  Group End Time: 1200  Group Topic: Cognitive Skills    [facility]    DARRYL Austin, MAYW        Group Therapy Note    Attendees: 13         Positive Support Systems    Notes: Pt active in class discussion. Status After Intervention:  Improved    Participation Level:  Active Listener    Participation Quality: Appropriate, Attentive, Sharing and Supportive      Speech:  normal      Thought Process/Content: Logical      Affective Functioning: Congruent      Mood: depressed      Level of consciousness:  Alert, Oriented x4 and Attentive      Response to Learning: Able to verbalize/acknowledge new learning      Endings: None Reported    Modes of Intervention: Education, Support, Socialization, Exploration, Clarifying and Problem-solving      Discipline Responsible: /Counselor      Signature:  DARRYL Austin, GREY

## 2020-10-21 PROCEDURE — 6370000000 HC RX 637 (ALT 250 FOR IP): Performed by: PSYCHIATRY & NEUROLOGY

## 2020-10-21 PROCEDURE — 6370000000 HC RX 637 (ALT 250 FOR IP): Performed by: NURSE PRACTITIONER

## 2020-10-21 PROCEDURE — 99231 SBSQ HOSP IP/OBS SF/LOW 25: CPT | Performed by: NURSE PRACTITIONER

## 2020-10-21 PROCEDURE — 1240000000 HC EMOTIONAL WELLNESS R&B

## 2020-10-21 RX ORDER — OXCARBAZEPINE 300 MG/1
300 TABLET, FILM COATED ORAL 2 TIMES DAILY
Status: DISCONTINUED | OUTPATIENT
Start: 2020-10-21 | End: 2020-10-22 | Stop reason: HOSPADM

## 2020-10-21 RX ADMIN — OXCARBAZEPINE 150 MG: 150 TABLET, FILM COATED ORAL at 09:29

## 2020-10-21 RX ADMIN — OXCARBAZEPINE 300 MG: 300 TABLET, FILM COATED ORAL at 20:47

## 2020-10-21 RX ADMIN — TRAZODONE HYDROCHLORIDE 50 MG: 50 TABLET ORAL at 20:49

## 2020-10-21 RX ADMIN — ACETAMINOPHEN 650 MG: 325 TABLET, FILM COATED ORAL at 12:39

## 2020-10-21 ASSESSMENT — PAIN SCALES - GENERAL
PAINLEVEL_OUTOF10: 0
PAINLEVEL_OUTOF10: 5
PAINLEVEL_OUTOF10: 0

## 2020-10-21 ASSESSMENT — PAIN DESCRIPTION - PAIN TYPE: TYPE: CHRONIC PAIN

## 2020-10-21 ASSESSMENT — PAIN DESCRIPTION - ORIENTATION: ORIENTATION: LOWER

## 2020-10-21 ASSESSMENT — PAIN DESCRIPTION - FREQUENCY: FREQUENCY: INTERMITTENT

## 2020-10-21 ASSESSMENT — PAIN DESCRIPTION - LOCATION: LOCATION: BACK

## 2020-10-21 ASSESSMENT — PAIN DESCRIPTION - DESCRIPTORS: DESCRIPTORS: DULL

## 2020-10-21 NOTE — PROGRESS NOTES
Family at bedside Spoke to Dr. Eriberto Smith concerned about being held without need. She feels that patient is not a threat to herself and would like to see her discharge .

## 2020-10-21 NOTE — GROUP NOTE
Group Therapy Note    Date: 10/21/2020    Group Start Time: 1100  Group End Time: 1200  Group Topic: Cognitive Skills    [facility]    DARRYL Crawford LSW        Group Therapy Note    Attendees: 12           Patient's Goal:  Pt will understand Positive and Negative Coping Skills    Notes:  Pt actively engaged in the group session. Status After Intervention:  Improved    Participation Level:  Active Listener    Participation Quality: Appropriate, Attentive, Sharing and Supportive      Speech:  normal      Thought Process/Content: Logical      Affective Functioning: Congruent      Mood: depressed      Level of consciousness:  Alert, Oriented x4 and Attentive      Response to Learning: Able to verbalize/acknowledge new learning      Endings: None Reported    Modes of Intervention: Education, Support, Socialization, Exploration, Clarifying and Problem-solving      Discipline Responsible: /Counselor      Signature:  DARRYL Crawford LSW

## 2020-10-21 NOTE — PROGRESS NOTES
BEHAVIORAL HEALTH FOLLOW-UP NOTE     10/21/2020     Patient was seen and examined in person, Chart reviewed   Patient's case discussed with staff/team    Chief Complaint: \" I would never hurt myself. \"    Interim History: Patient up on the unit states that she would never hurt herself. She states that she never really had suicidal thoughts they were just \"more like daydreams. \"  She states she feels better on this medication has not had any more daydreams. She is interested in her diagnosis as well as her treatment. She does report that some of her fibromyalgia pain is coming back but she does not want to go back on the Cymbalta that she feels like that had been part of the reason for her mental instability. She does feel better on the Trileptal.  She vehemently denies any suicidal homicidal ideations intent or plan. She states that she wants to get back to her kids. She is eating well sleeping well there are no neurovegetative signs of depression. Denies any auditory visual hallucinations.       Appetite:   [x] Normal/Unchanged  [] Increased  [] Decreased      Sleep:       [x] Normal/Unchanged  [] Fair       [] Poor              Energy:    [x] Normal/Unchanged  [] Increased  [] Decreased        SI [] Present  [x] Absent    HI  []Present  [x] Absent     Aggression:  [] yes  [x] no    Patient is [x] able  [] unable to CONTRACT FOR SAFETY     PAST MEDICAL/PSYCHIATRIC HISTORY:   Past Medical History:   Diagnosis Date    Arthritis     Asthma     not taking any medications    Lesion of mouth        FAMILY/SOCIAL HISTORY:  Family History   Problem Relation Age of Onset    High Blood Pressure Mother     Breast Cancer Mother 35    Thyroid Cancer Mother 44    No Known Problems Father         Heart issues run in family    Cancer Maternal Aunt         skin    Pancreatic Cancer Maternal Grandfather 48    Breast Cancer Maternal Cousin 39    Breast Cancer Maternal Cousin 28        second cousin    No Known MEDICATIONS:    Current Facility-Administered Medications:     OXcarbazepine (TRILEPTAL) tablet 300 mg, 300 mg, Oral, BID, SYLVIA Thacker - CNP    acetaminophen (TYLENOL) tablet 650 mg, 650 mg, Oral, Q6H PRN, Susan Dey MD, 650 mg at 10/21/20 1239    hydrOXYzine (VISTARIL) capsule 50 mg, 50 mg, Oral, TID PRN, Susan Dey MD, 50 mg at 10/19/20 2053    haloperidol lactate (HALDOL) injection 5 mg, 5 mg, Intramuscular, Q6H PRN **OR** haloperidol (HALDOL) tablet 5 mg, 5 mg, Oral, Q6H PRN, Susan Dey MD    traZODone (DESYREL) tablet 50 mg, 50 mg, Oral, Nightly PRN, Susan Dey MD, 50 mg at 10/20/20 2110    magnesium hydroxide (MILK OF MAGNESIA) 400 MG/5ML suspension 30 mL, 30 mL, Oral, Daily PRN, Susan Dey MD    aluminum & magnesium hydroxide-simethicone (MAALOX) 200-200-20 MG/5ML suspension 30 mL, 30 mL, Oral, PRN, Susan Dey MD      Examination:  /79   Pulse 90   Temp 98.4 °F (36.9 °C) (Oral)   Resp 14   Ht 5' 9\" (1.753 m)   Wt 255 lb (115.7 kg)   LMP 09/28/2020 (Approximate)   SpO2 98%   BMI 37.66 kg/m²   Gait - steady  Medication side effects(SE): Denies    Mental Status Examination:    Level of consciousness:  within normal limits   Appearance:  fair grooming and fair hygiene  Behavior/Motor:  no abnormalities noted  Attitude toward examiner:  cooperative  Speech:  spontaneous, normal rate and normal volume   Mood: \" My mood feels a lot better. \"  Affect: Appropriate and pleasant  Thought processes: Linear without flight of ideas or loose associations  Thought content: Devoid of any auditory visual hallucinations delusions or perceptual normalities.   Denies SI/HI intent or plan  Cognition:  oriented to person, place, and time   Concentration intact  Insight fair   Judgement fair     ASSESSMENT:   Patient symptoms are:  [] Well controlled  [x] Improving  [] Worsening  [] No change      Diagnosis:   Active Problems:    Bipolar II disorder, most recent episode hypomanic [facility])  Resolved Problems:    * No resolved hospital problems. *      LABS:    Recent Labs     10/19/20  1456   WBC 11.0   HGB 13.9        Recent Labs     10/19/20  1456      K 3.6      CO2 26   BUN 10   CREATININE 0.7   GLUCOSE 117*     Recent Labs     10/19/20  1456   BILITOT 0.2   ALKPHOS 54   AST 15   ALT 15     Lab Results   Component Value Date    LABAMPH NOT DETECTED 10/19/2020    BARBSCNU NOT DETECTED 10/19/2020    LABBENZ NOT DETECTED 10/19/2020    LABMETH NOT DETECTED 10/19/2020    OPIATESCREENURINE NOT DETECTED 10/19/2020    PHENCYCLIDINESCREENURINE NOT DETECTED 10/19/2020    ETOH <10 10/19/2020     Lab Results   Component Value Date    TSH 0.848 08/08/2020     No results found for: LITHIUM  No results found for: VALPROATE, CBMZ      Treatment Plan:  Reviewed current Medications with the patient. Risks, benefits, side effects, drug-to-drug interactions and alternatives to treatment were discussed. Collateral information:   CD evaluation  Encourage patient to attend group and other milieu activities.   Discharge planning discussed with the patient and treatment team.    Increase Trileptal 300 mg twice daily    PSYCHOTHERAPY/COUNSELING:  [x] Therapeutic interview  [x] Supportive  [] CBT  [] Ongoing  [] Other    [x] Patient continues to need, on a daily basis, active treatment furnished directly by or requiring the supervision of inpatient psychiatric personnel      Anticipated Length of stay: 3 to 5 days based on stability            Electronically signed by SYLVIA Cabral CNP on 34/61/2398 at 4:57 PM

## 2020-10-21 NOTE — GROUP NOTE
Attended community meeting shared goal for the day as to learn my treatment plan. Group Therapy Note    Date: 10/21/2020    Group Start Time: 8174  Group End Time: 9220  Group Topic: Psychoeducation    [facility]  [id number] [place], [place]        Group Therapy Note    Date: 10/21/2020  Type of Group: Psychoeducation    Wellness Binder Information  Module Name: id of stressors/positive coping skills   Patient's Goal: Patient will be able to id daily hassles, major life changes and current life circumstances. Notes:  pleasant and sharing appropriately in group. Status After Intervention:  Improved  Participation Level:  Active Listener and Interactive  Participation Quality: Appropriate, Attentive, Sharing, and Supportive  Speech:  normal  Thought Process/Content: Logical  Affective Functioning: Congruent  Mood: euthymic  Level of consciousness:  Alert, Oriented x4, and Attentive  Response to Learning: Able to verbalize/acknowledge new learning, Able to retain information, and Progressing to goal  Endings: None Reported  Modes of Intervention: Education, Support, Socialization, Exploration, and Problem-solving  Discipline Responsible: Psychoeducational Specialist  Signature:  Mark Palomino

## 2020-10-21 NOTE — PROGRESS NOTES
Attended afternoon meet and greet. Updated on afternoon staffing and changes. Engaged in logos trivia. Patient 1 of 10.

## 2020-10-21 NOTE — PLAN OF CARE
Problem: Altered Mood, Depressive Behavior:  Goal: Able to verbalize and/or display a decrease in depressive symptoms  Description: Able to verbalize and/or display a decrease in depressive symptoms  Outcome: Ongoing  Goal: Absence of self-harm  Description: Absence of self-harm  Outcome: Met This Shift         Patient pleasant and cooperative. Brightens on approach. Mood and affect improving. Denies suicidal and homicidal thoughts. Denies hallucinations. Will continue to observe and support.

## 2020-10-21 NOTE — PLAN OF CARE
Patient denies suicidal ideations, homicidal ideations and hallucinations at this time. Denies any depression currently but reports being anxious at times. Patient is calm and cooperative during assessment. Patient has been out on unit watching tv all evening and has been social with peers. No complaints or concerns voiced at this time. No unit problems reported. Will continue to observe and support.      Problem: Altered Mood, Depressive Behavior:  Goal: Able to verbalize and/or display a decrease in depressive symptoms  Description: Able to verbalize and/or display a decrease in depressive symptoms  10/20/2020 2048 by Lokesh Brothers RN  Outcome: Met This Shift  10/20/2020 0840 by Ryan Leach RN  Outcome: Ongoing  Goal: Absence of self-harm  Description: Absence of self-harm  10/20/2020 2048 by Lokesh Brothers RN  Outcome: Met This Shift  10/20/2020 0840 by Ryan Leach RN  Outcome: Ongoing

## 2020-10-22 VITALS
DIASTOLIC BLOOD PRESSURE: 64 MMHG | SYSTOLIC BLOOD PRESSURE: 121 MMHG | HEART RATE: 88 BPM | TEMPERATURE: 98.8 F | OXYGEN SATURATION: 98 % | HEIGHT: 69 IN | RESPIRATION RATE: 16 BRPM | WEIGHT: 255 LBS | BODY MASS INDEX: 37.77 KG/M2

## 2020-10-22 PROCEDURE — 99239 HOSP IP/OBS DSCHRG MGMT >30: CPT | Performed by: NURSE PRACTITIONER

## 2020-10-22 PROCEDURE — 6370000000 HC RX 637 (ALT 250 FOR IP): Performed by: NURSE PRACTITIONER

## 2020-10-22 RX ORDER — OXCARBAZEPINE 300 MG/1
300 TABLET, FILM COATED ORAL 2 TIMES DAILY
Qty: 60 TABLET | Refills: 0 | Status: SHIPPED | OUTPATIENT
Start: 2020-10-22 | End: 2020-11-19 | Stop reason: SDUPTHER

## 2020-10-22 RX ADMIN — OXCARBAZEPINE 300 MG: 300 TABLET, FILM COATED ORAL at 08:02

## 2020-10-22 NOTE — DISCHARGE SUMMARY
DISCHARGE SUMMARY      Patient ID:  Heidi Troncoso  [id number]  27 y.o.  1985    Admit date: 10/19/2020    Discharge date and time: 10/22/2020    Admitting Physician: Maura Conn MD     Discharge Physician: Dr Blanca Long MD    Admission Diagnoses: Major depression, recurrent ([facility].) [F33.9]    Admission Condition: poor    Discharged Condition: stable    Admission Circumstance: Patient presented to the ED reporting suicidal ideation with a plan to his car monoxide poisoning    PAST MEDICAL/PSYCHIATRIC HISTORY:   Past Medical History:   Diagnosis Date    Arthritis     Asthma     not taking any medications    Lesion of mouth        FAMILY/SOCIAL HISTORY:  Family History   Problem Relation Age of Onset    High Blood Pressure Mother     Breast Cancer Mother 35    Thyroid Cancer Mother 44    No Known Problems Father         Heart issues run in family    Cancer Maternal Aunt         skin    Pancreatic Cancer Maternal Grandfather 48    Breast Cancer Maternal Cousin 39    Breast Cancer Maternal Cousin 28        second cousin    No Known Problems Sister     No Known Problems Brother     No Known Problems Maternal Grandmother      Social History     Socioeconomic History    Marital status:      Spouse name: Not on file    Number of children: 2    Years of education: 12    Highest education level: Associate degree: academic program   Occupational History    Not on file   Social Needs    Financial resource strain: Somewhat hard    Food insecurity     Worry: Never true     Inability: Never true    Transportation needs     Medical: No     Non-medical: No   Tobacco Use    Smoking status: Never Smoker    Smokeless tobacco: Never Used   Substance and Sexual Activity    Alcohol use:  Yes     Alcohol/week: 0.0 standard drinks     Frequency: 2-4 times a month     Drinks per session: 3 or 4     Binge frequency: Monthly     Comment: socially    Drug use: No    Sexual activity: Not Currently Partners: Male     Birth control/protection: Pill   Lifestyle    Physical activity     Days per week: Not on file     Minutes per session: Not on file    Stress: Not on file   Relationships    Social connections     Talks on phone: Not on file     Gets together: Not on file     Attends [employer] service: Not on file     Active member of club or organization: Not on file     Attends meetings of clubs or organizations: Not on file     Relationship status: Not on file    Intimate partner violence     Fear of current or ex partner: Not on file     Emotionally abused: Not on file     Physically abused: Not on file     Forced sexual activity: Not on file   Other Topics Concern    Not on file   Social History Narrative    Not on file       MEDICATIONS:    Current Facility-Administered Medications:     OXcarbazepine (TRILEPTAL) tablet 300 mg, 300 mg, Oral, BID, SYLVIA Quintana - CNP, 020 mg at 10/22/20 0802    acetaminophen (TYLENOL) tablet 650 mg, 650 mg, Oral, Q6H PRN, Holden Rivera MD, 650 mg at 10/21/20 1239    hydrOXYzine (VISTARIL) capsule 50 mg, 50 mg, Oral, TID PRN, Holden Rivera MD, 50 mg at 10/19/20 2053    haloperidol lactate (HALDOL) injection 5 mg, 5 mg, Intramuscular, Q6H PRN **OR** haloperidol (HALDOL) tablet 5 mg, 5 mg, Oral, Q6H PRN, Holden Rivera MD    traZODone (DESYREL) tablet 50 mg, 50 mg, Oral, Nightly PRN, Holden Rivera MD, 50 mg at 10/21/20 2049    magnesium hydroxide (MILK OF MAGNESIA) 400 MG/5ML suspension 30 mL, 30 mL, Oral, Daily PRN, Holden Rivera MD    aluminum & magnesium hydroxide-simethicone (MAALOX) 200-200-20 MG/5ML suspension 30 mL, 30 mL, Oral, PRN, Holden Rivera MD    Examination:  /64   Pulse 88   Temp 98.8 °F (37.1 °C) (Temporal)   Resp 16   Ht 5' 9\" (1.753 m)   Wt 255 lb (115.7 kg)   LMP 09/28/2020 (Approximate)   SpO2 98%   BMI 37.66 kg/m²   Gait - steady    HOSPITAL COURSE[de-identified]  Patient is admitted to unit on 10/19/2020 avni monitor for suicidal ideations. She was evaluated was treated with Trileptal 300 mg twice daily for mood stabilization. Medical events were insignificant patient continued to improve on the floor. She will come out of her room she was attending groups and she was socializing with peers. She never made any suicidal statements or any suicidal gestures while in the unit.  obtain confirmation from patient mother who reported no concerns for patient safety renounces safety on discharge. No access to any weapons. s treatment team felt the patient did not oxen benefit from hospitalization. She was set up with an outpatient health agency for outpatient follow-up services. The time of discharge patient did not show any impulsive behavior. She was eating well and sleeping well there are no neurovegetative signs or symptoms depression. She denies any auditory or visual hallucinations were no overt overt signs psychosis. She was appreciative the help that she received her. This patient no longer meets criteria for inpatient hospitalization. No AVH or paranoid thoughts  No hopeless or worthless feeling  No active SI/HI  Appetite:  [x] Normal  [] Increased  [] Decreased    Sleep:       [x] Normal  [] Fair       [] Poor            Energy:    [x] Normal  [] Increased  [] Decreased     SI [] Present  [x] Absent  HI  []Present  [x] Absent   Aggression:  [] yes  [x] no  Patient is [x] able  [] unable to CONTRACT FOR SAFETY   Medication side effects(SE):  [x] None(Psych. Meds.) [] Other      Mental Status Examination on discharge:    Level of consciousness:  within normal limits   Appearance:  well-appearing  Behavior/Motor:  no abnormalities noted  Attitude toward examiner:  attentive and good eye contact  Speech:  spontaneous, normal rate and normal volume   Mood: \" My mood is good. \"  Affect: Appropriate and pleasant  Thought processes: Linear without flight of ideas or loose associations  Thought content: Devoid of any auditory visualizations delusions or perceptual abnormalities. Denies SI/HI intent or plan  Cognition:  oriented to person, place, and time   Concentration intact  Memory intact  Insight good   Judgement fair   Fund of Knowledge adequate      ASSESSMENT:  Patient symptoms are:  [x] Well controlled  [x] Improving  [] Worsening  [] No change    Reason for more than one antipsychotic:  [x] N/A  [] 3 Failed Monotherapy attempts (Drugs tried:)  [] Crossover to a new antipsychotic  [] Taper to Monotherapy from Polypharmacy  [] Augmentation of clozapine therapy due to treatment resistance to single therapy    Diagnosis:  Principal Problem:    Bipolar II disorder, most recent episode hypomanic ([facility] [address] [facility]  Resolved Problems:    * No resolved hospital problems. *      LABS:    Recent Labs     10/19/20  1456   WBC 11.0   HGB 13.9        Recent Labs     10/19/20  1456      K 3.6      CO2 26   BUN 10   CREATININE 0.7   GLUCOSE 117*     Recent Labs     10/19/20  1456   BILITOT 0.2   ALKPHOS 54   AST 15   ALT 15     Lab Results   Component Value Date    LABAMPH NOT DETECTED 10/19/2020    BARBSCNU NOT DETECTED 10/19/2020    LABBENZ NOT DETECTED 10/19/2020    LABMETH NOT DETECTED 10/19/2020    OPIATESCREENURINE NOT DETECTED 10/19/2020    PHENCYCLIDINESCREENURINE NOT DETECTED 10/19/2020    ETOH <10 10/19/2020     Lab Results   Component Value Date    TSH 0.848 08/08/2020     No results found for: LITHIUM  No results found for: VALPROATE, CBMZ    RISK ASSESSMENT AT DISCHARGE: Low risk for suicide and homicide. Treatment Plan:  Reviewed current Medications with the patient. Education provided on the complaince with treatment. Risks, benefits, side effects, drug-to-drug interactions and alternatives to treatment were discussed. Encourage patient to attend outpatient follow up appointment and therapy.     Patient was advised to call the outpatient provider, visit the nearest ED or call 911 if symptoms are not manageable. Patient's family member was contacted prior to the discharge.          Medication List      START taking these medications    OXcarbazepine 300 MG tablet  Commonly known as:  TRILEPTAL  Take 1 tablet by mouth 2 times daily        CONTINUE taking these medications    cetirizine 10 MG tablet  Commonly known as:  ZYRTEC     fluticasone 50 MCG/ACT nasal spray  Commonly known as:  FLONASE  INSTILL 1 SPRAY BY NASAL ROUTE DAILY        STOP taking these medications    DULoxetine 20 MG extended release capsule  Commonly known as:  CYMBALTA           Where to Get Your Medications      These medications were sent to [facility] [address] [address]    Phone:  [phone]   · OXcarbazepine 300 MG tablet       Patient is counseled she must been compliant with all medications outpatient follow-up appointments    Patient is discharged home in stable condition    TIME SPEND - 35 MINUTES TO COMPLETE THE EVALUATION, DISCHARGE SUMMARY, MEDICATION RECONCILIATION AND FOLLOW UP CARE     Signed:  Llily Christina  80/36/2534  30:10 AM

## 2020-10-22 NOTE — PROGRESS NOTES
Out using phone hygiene improved denies any SI HI or valdes affect relaxed less depressed emotional support given pt is aware of increase in trileptal dose this evening

## 2020-10-22 NOTE — PROGRESS NOTES
CLINICAL PHARMACY NOTE: MEDS TO [address] Select Patient?: No  Total # of Prescriptions Filled: 1   The following medications were delivered to the patient:  · Oxcarbazepine 300 mg     Total # of Interventions Completed: 2  Time Spent (min): 30    Additional Documentation:    Only filled 30 tabs allegra rn aware that they will need to refill to complete therapy.

## 2020-10-22 NOTE — PROGRESS NOTES
Pt discharged with followings belongings:   Dentures: None  Vision - Corrective Lenses: Glasses  Hearing Aid: None  Jewelry: None  Body Piercings Removed: N/A  Clothing: Footwear, Pants, Shirt, Socks, Undergarments (Comment)(2 pr. pants , 4 shirts, 1 orange pullover sweatshirt)  Were All Patient Medications Collected?: Not Applicable  Other Valuables: (toiletries, tote bag, 2 books)   Valuables sent home with patient on discharge including cell phone. Valuables retrieved from safe, Security envelope number:   none and returned to patient. Patient left department with Departure Mode: By self via Mobility at Departure: Ambulatory, discharged to Discharged to: Private Residence. Patient education on aftercare instructions:  yes  Patient verbalize understanding of AVS:   yes. Given suicide prevention handout . Discharged in stable condition.   Status EXAM upon discharge:  Status and Exam  Normal: No  Facial Expression: Brightened  Affect: Appropriate, Congruent  Level of Consciousness: Alert  Mood:Normal: No  Mood: Anxious  Motor Activity:Normal: Yes  Interview Behavior: Cooperative  Preception: [place] to Person, [place] to Time, [place] to Place, [place] to Situation  Attention:Normal: Yes  Thought Processes: Other(See comment)(improving)  Thought Content:Normal: Yes  Hallucinations: None  Delusions: No  Memory:Normal: Yes  Insight and Judgment: No  Insight and Judgment: (improved)  Present Suicidal Ideation: No  Present Homicidal Ideation: No    Meeta Valentin RN

## 2020-10-22 NOTE — SUICIDE SAFETY PLAN
SAFETY PLAN    A suicide Safety Plan is a document that supports someone when they are having thoughts of suicide. Warning Signs that indicate a suicidal crisis may be developing: What (situations, thoughts, feelings, body sensations, behaviors, etc.) do you experience that lets you know you are beginning to think about suicide? 1. crying  2. Being touch  3. Being concerned makes me nervous    Internal Coping Strategies:  What things can I do (relaxation techniques, hobbies, physical activities, etc.) to take my mind off my problems without contacting another person? 1.  Walking with the Dog  2. Knitting  3.  Reading    People and social settings that provide distraction: Who can I call or where can I go to distract me? 1. Name:  [employer]  Phone:  NA  2. Name:  Nena Sahnicheri  Phone:   [phone]  3. Place:     NA         4. Place:  NA    People whom I can ask for help: Who can I call when I need help - for example, friends, family, clergy, someone else? 1. Name:  "[employer]                Phone:  NA  2. Name:  Nena Weinberg   Phone:  NA  3. Name:  TAYLA  Phone:  Tayla    [employer] or [address] [place] [address] agencies I can contact during a crisis: Who can I call for help - for example, my doctor, my psychiatrist, my psychologist, a mental health provider, a suicide hotline? 1. Clinician Name:  Dr. Isidra Mac   Phone:  [phone] Pager or Emergency Contact #:  NA    2. Clinician Name:  TAYLA   Phone:  TAYLA      Clinician Pager or Emergency Contact #:  NA    3. Suicide Prevention Lifeline: 6-269-826-TALK (2189)    4. [address] [place] [address] Emergency Services -  for example, [facility] suicide hotline, [facility] Hotline:  211 or (04      Emergency Services Address:  na      Emergency Services Phone:  na    Making the environment safe: How can I make my environment (house/apartment/living space) safer?  For example, can I remove guns, medications, and other items? 1.   PROVIDING SPACE  2.  KEEPING ROOM TIDY  3. REMOVE MEDICATIONS

## 2020-11-19 ENCOUNTER — TELEPHONE (OUTPATIENT)
Dept: FAMILY MEDICINE CLINIC | Age: 35
End: 2020-11-19

## 2020-11-19 RX ORDER — OXCARBAZEPINE 300 MG/1
300 TABLET, FILM COATED ORAL 2 TIMES DAILY
Qty: 60 TABLET | Refills: 0 | Status: SHIPPED
Start: 2020-11-19 | End: 2020-11-24 | Stop reason: SDUPTHER

## 2020-11-19 NOTE — TELEPHONE ENCOUNTER
Patient called asking if Dr. Cricket Solares would be willing to provide a letter to excuse her from mandatory flu vaccine for work. She states she got for many years in a row until she was [age] and had severe low back pain after each time.  She has not had the vaccine in recent years and her employer is aware of intolerance in the past. Letter may be faxed to [phone] ASAP (latest date 11/30)

## 2020-11-19 NOTE — TELEPHONE ENCOUNTER
Requested Prescriptions     Pending Prescriptions Disp Refills    OXcarbazepine (TRILEPTAL) 300 MG tablet 60 tablet 0     Sig: Take 1 tablet by mouth 2 times daily

## 2020-11-24 RX ORDER — OXCARBAZEPINE 300 MG/1
300 TABLET, FILM COATED ORAL 2 TIMES DAILY
Qty: 60 TABLET | Refills: 0 | Status: SHIPPED
Start: 2020-11-24 | End: 2020-11-30 | Stop reason: SDUPTHER

## 2020-11-24 NOTE — Clinical Note
Patient called my after-hours line by mistake. I sent a refill in for her Trileptal - she was worried she would run out.   CALLUM :)

## 2020-11-24 NOTE — PROGRESS NOTES
Spoke with patient. She called my line by mistake, was seeking Dr. Dia Negron. She needs a refill on her trileptal.   Looks like Refill request placed on 11/19. Patient worried she is going to run out of medication. She is feeling well  She has follow up with Dr Dia Negron on 11/30. Will send a month to pharmacy for her so she doesn't run out.      Electronically signed by Jaja Hartley MD on 11/24/2020 at 5:17 PM

## 2020-11-25 ENCOUNTER — VIRTUAL VISIT (OUTPATIENT)
Dept: FAMILY MEDICINE CLINIC | Age: 35
End: 2020-11-25
Payer: COMMERCIAL

## 2020-11-25 PROCEDURE — G8427 DOCREV CUR MEDS BY ELIG CLIN: HCPCS | Performed by: FAMILY MEDICINE

## 2020-11-25 PROCEDURE — 99213 OFFICE O/P EST LOW 20 MIN: CPT | Performed by: FAMILY MEDICINE

## 2020-11-25 RX ORDER — AMOXICILLIN AND CLAVULANATE POTASSIUM 875; 125 MG/1; MG/1
1 TABLET, FILM COATED ORAL 2 TIMES DAILY
Qty: 14 TABLET | Refills: 0 | Status: SHIPPED | OUTPATIENT
Start: 2020-11-25 | End: 2020-12-02

## 2020-11-25 RX ORDER — FLUCONAZOLE 150 MG/1
TABLET ORAL
Qty: 2 TABLET | Refills: 0 | Status: SHIPPED
Start: 2020-11-25 | End: 2020-12-15

## 2020-11-25 NOTE — PROGRESS NOTES
Subjective:  37 y.o. y/o female is here with complaints of sinus pressure, drainage and ear pain for 4-5 day(s)--getting worse. Cough is not productive. Associated signs and symptoms include cervical LAD. Denies fever, chills, body aches, nausea. Taking sinus/headache decongestant. Patient does not have a change in appetite. Patient is drinking well. Patient does not smoke. + seasonal allergies--just flonase now. Sick contacts include people/patients at work. Visit done via video telemedicine with Doxy. me due to current COVID-19 pandemic. Possible yeast infection, +itchiness and white thick discharge, no odor, single partner with no known STI risk, starting menses in couple days    Awaiting insurance change next week to establish with psych, feeling better, poor motivation but no SI/HI    Patient's past medical, surgical, social and/or family history reviewed, updated in chart, and are non-contributory (unless otherwise stated). Medications and allergies also reviewed and updated in chart. Review of Systems:  Constitutional:  No fatigue, + headaches, no weight change  Dermatology:  No rash, no dry or sensitive skin  Gastroenterology:  No abdominal pain, no nausea, no vomiting, no diarrhea  Urology:  No urinary symptoms  ROS otherwise as above    There were no vitals filed for this visit. There is no height or weight on file to calculate BMI. General:  Patient alert and oriented x 3, NAD, pleasant  HEENT:  Atraumatic, normocephalic  Neck:  Supple  Lungs: no distresss    Assessment/Plan:      Rai was seen today for head congestion, otalgia and vaginal itching. Diagnoses and all orders for this visit:    Acute non-recurrent sinusitis, unspecified location  -     amoxicillin-clavulanate (AUGMENTIN) 875-125 MG per tablet;  Take 1 tablet by mouth 2 times daily for 7 days  + Due to chronic congestion/allergies and getting worse at 5 days, will go ahead and treat with antibiotic  + Continue the flonase  + Add in antihistamine     Non-seasonal allergic rhinitis, unspecified trigger    Vaginal yeast infection  -     fluconazole (DIFLUCAN) 150 MG tablet; Take 1 tablet orally. Repeat following completion of antibiotics if needed. Supportive/symptomatic therapy. As above. Call or go to ED immediately if symptoms worsen or persist.  Return if symptoms worsen or fail to improve. , or sooner if necessary. Counseled regarding above diagnosis, including possible risks and complications,  especially if left uncontrolled. Counseled regarding the possible side effects, risks, benefits and alternatives to treatment; patient and/or guardian verbalizes understanding, agrees, feels comfortable with and wishes to proceed with above treatment plan. Advised patient to call with any new medication issues, and read all Rx info from pharmacy to assure aware of all possible risks and side effects of medication before taking. Patient and/or guardian verbalizes understanding and agrees with above counseling, assessment and plan. All questions answered.

## 2020-11-25 NOTE — PROGRESS NOTES
TeleMedicine Patient Consent    This visit was performed as a virtual video visit using a synchronous, two-way, audio-video telehealth technology platform. Patient identification was verified at the start of the visit, including the patient's telephone number and physical location. I discussed with the patient the nature of our telehealth visits, that:     1. Due to the nature of an audio- video modality, the only components of a physical exam that could be done are the elements supported by direct observation. 2. The provider will evaluate the patient and recommend diagnostics and treatments based on their assessment. 3. If it was felt that the patient should be evaluated in clinic or an emergency room setting, then they would be directed there. 4. Our sessions are not being recorded and that personal health information is protected. 5. Our team would provide follow up care in person if/when the patient needs it. Patient does agree to proceed with telemedicine consultation. Patient location: home address in [facility]    Physician location: regular office location    This visit was completed virtually using Doxy. me    This visit was performed during the 8006 public health crisis and COVID-19 pandemic.   *Add GT modifier to all Video Visits*

## 2020-11-30 ENCOUNTER — VIRTUAL VISIT (OUTPATIENT)
Dept: FAMILY MEDICINE CLINIC | Age: 35
End: 2020-11-30
Payer: COMMERCIAL

## 2020-11-30 PROCEDURE — G8427 DOCREV CUR MEDS BY ELIG CLIN: HCPCS | Performed by: FAMILY MEDICINE

## 2020-11-30 PROCEDURE — 99213 OFFICE O/P EST LOW 20 MIN: CPT | Performed by: FAMILY MEDICINE

## 2020-11-30 RX ORDER — OXCARBAZEPINE 300 MG/1
300 TABLET, FILM COATED ORAL 2 TIMES DAILY
Qty: 60 TABLET | Refills: 3 | Status: SHIPPED
Start: 2020-11-30 | End: 2021-03-22
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
## 2020-11-30 NOTE — PROGRESS NOTES
TeleMedicine Patient Consent    This visit was performed as a virtual video visit using a synchronous, two-way, audio-video telehealth technology platform. Patient identification was verified at the start of the visit, including the patient's telephone number and physical location. I discussed with the patient the nature of our telehealth visits, that:     1. Due to the nature of an audio- video modality, the only components of a physical exam that could be done are the elements supported by direct observation. 2. The provider will evaluate the patient and recommend diagnostics and treatments based on their assessment. 3. If it was felt that the patient should be evaluated in clinic or an emergency room setting, then they would be directed there. 4. Our sessions are not being recorded and that personal health information is protected. 5. Our team would provide follow up care in person if/when the patient needs it. Patient does agree to proceed with telemedicine consultation. Patient location: home address in [facility]    Physician location: regular office location    This visit was completed virtually using Doxy. me    This visit was performed during the 2368 public health crisis and COVID-19 pandemic.   *Add GT modifier to all Video Visits* [No studies available for review at this time.] : No studies available for review at this time.

## 2020-11-30 NOTE — PROGRESS NOTES
Subjective:  37 y.o. y/o female is here for depression/anxiety f/u. Visit done via video telemedicine with Doxy. me due to current COVID-19 pandemic. Admitted to [facility] 10/19-10/22 for major depression with SI, cymbalta stopped, started on Tripleptal, told not to take antidepressants, diagnosed with bipolar 2 with hypomania    Taking the tripleptal and tolerating OK  Concentration improved, feels in control  Feels a lack of energy  Still feeling some social anxiety, feels extremely \"cautious\", not like this in years past   Sleeping better, more consistent, 7-8 hours of sleep  No further SI  Unable to find counselor to fit with work hours  Waiting to establish with psych until new insurance starts any day now  Single mom for 8 years  Works as addiction counselor    ROS otherwise reviewed as negative    Patient's past medical, surgical, social and/or family history reviewed, updated in chart, and are non-contributory (unless otherwise stated). Medications and allergies also reviewed and updated in chart. There were no vitals filed for this visit. There is no height or weight on file to calculate BMI. General:  Patient alert and oriented x 3, NAD, pleasant  HEENT:  Atraumatic, normocephalic  Neck:  Supple  Lungs: no resp distress      Assessment/Plan:      Pascual Mary was seen today for depression. Diagnoses and all orders for this visit:    Anxiety and depression  Will refill the trileptal  Did speak with our clinical pharmacist about dosing since there are not clear recommendations in UpToDate about dosing targets with Trileptal--max would be 1200mg/day, increase in increments of 150-300mg. Plan to continue at same dosing for now as mood dose seem stabilized  Will need to address anxiety and motivation with psychiatry and therapy once established. Other orders  -     OXcarbazepine (TRILEPTAL) 300 MG tablet; Take 1 tablet by mouth 2 times daily      As above.   Call or go to ED immediately if symptoms worsen or persist.  Follow up in 3 months regarding above and chronic issues, or sooner if necessary. Counseled regarding above diagnosis, including possible risks and complications,  especially if left uncontrolled. Counseled regarding the possible side effects, risks, benefits and alternatives to treatment; patient and/or guardian verbalizes understanding, agrees, feels comfortable with and wishes to proceed with above treatment plan. Advised patient to call with any new medication issues, and read all Rx info from pharmacy to assure aware of all possible risks and side effects of medication before taking. Patient and/or guardian verbalizes understanding and agrees with above counseling, assessment and plan. All questions answered.

## 2020-12-14 ENCOUNTER — TELEPHONE (OUTPATIENT)
Dept: FAMILY MEDICINE CLINIC | Age: 35
End: 2020-12-14

## 2020-12-14 NOTE — TELEPHONE ENCOUNTER
Increase her water intake and ibuprofen is good. Video visit could be OK but in-person would be better. If severe and persistent pain, then she should consider the ED. Thanks.

## 2020-12-14 NOTE — TELEPHONE ENCOUNTER
Patient called c/o pelvic pain and irregular menses. LMP 11/23, then began spotting (bright pink) again yesterday. Gyn (Dr. Gael eCrda) recommended she take Ibuprofen and call in a few days if no improvement. Pain currently 6/10. She is also having nausea/dry heaves, lower left back pain, mild dizziness, hot flashes intermittently, and bilateral breast pain last week. Negative pregnancy test at home.  Asks if there is anything else Dr. Tc Arriaza can recommend for pain/treatment

## 2020-12-14 NOTE — TELEPHONE ENCOUNTER
Patient informed of recommendations per Dr. Angie Smith. She agreed to come into the office for evaluation.  Appointment given for 12/15/2020

## 2020-12-15 ENCOUNTER — OFFICE VISIT (OUTPATIENT)
Dept: FAMILY MEDICINE CLINIC | Age: 35
End: 2020-12-15
Payer: COMMERCIAL

## 2020-12-15 VITALS
HEIGHT: 69 IN | RESPIRATION RATE: 16 BRPM | TEMPERATURE: 97 F | OXYGEN SATURATION: 98 % | WEIGHT: 249 LBS | HEART RATE: 85 BPM | BODY MASS INDEX: 36.88 KG/M2 | DIASTOLIC BLOOD PRESSURE: 78 MMHG | SYSTOLIC BLOOD PRESSURE: 130 MMHG

## 2020-12-15 LAB
BILIRUBIN, POC: NORMAL
BLOOD URINE, POC: NORMAL
CLARITY, POC: CLEAR
COLOR, POC: YELLOW
CONTROL: NORMAL
GLUCOSE URINE, POC: NORMAL
KETONES, POC: NORMAL
LEUKOCYTE EST, POC: NORMAL
NITRITE, POC: NORMAL
PH, POC: 5.5
PREGNANCY TEST URINE, POC: NORMAL
PROTEIN, POC: NORMAL
SPECIFIC GRAVITY, POC: 1.02
UROBILINOGEN, POC: 0.2

## 2020-12-15 PROCEDURE — 81025 URINE PREGNANCY TEST: CPT | Performed by: FAMILY MEDICINE

## 2020-12-15 PROCEDURE — G8427 DOCREV CUR MEDS BY ELIG CLIN: HCPCS | Performed by: FAMILY MEDICINE

## 2020-12-15 PROCEDURE — 99213 OFFICE O/P EST LOW 20 MIN: CPT | Performed by: FAMILY MEDICINE

## 2020-12-15 PROCEDURE — G8484 FLU IMMUNIZE NO ADMIN: HCPCS | Performed by: FAMILY MEDICINE

## 2020-12-15 PROCEDURE — 81002 URINALYSIS NONAUTO W/O SCOPE: CPT | Performed by: FAMILY MEDICINE

## 2020-12-15 PROCEDURE — G8417 CALC BMI ABV UP PARAM F/U: HCPCS | Performed by: FAMILY MEDICINE

## 2020-12-15 PROCEDURE — 1036F TOBACCO NON-USER: CPT | Performed by: FAMILY MEDICINE

## 2020-12-15 RX ORDER — NAPROXEN 250 MG/1
500 TABLET ORAL EVERY 12 HOURS
Qty: 60 TABLET | Refills: 0 | Status: SHIPPED
Start: 2020-12-15 | End: 2022-11-02 | Stop reason: ALTCHOICE

## 2020-12-15 ASSESSMENT — ENCOUNTER SYMPTOMS
WHEEZING: 0
CONSTIPATION: 0
EYE PAIN: 0
BACK PAIN: 1
DIARRHEA: 1
SHORTNESS OF BREATH: 0
BLOOD IN STOOL: 0
COLOR CHANGE: 0
COUGH: 0
NAUSEA: 1
ABDOMINAL PAIN: 1
VOMITING: 0

## 2020-12-15 NOTE — PROGRESS NOTES
[facility] [address]  Precepting Note    Subjective:  Pelvic pain  LMP 11/27  3 days  Light  2 days ago had heavy bleeding and RLQ pain / pelvic pain  Worse than normal pain. 2 pads today. + nausea  Skarote. Plan for or consideration of IUD. Advised Motrin by her GYN for this yday. She has been doing this without much help. FH breast CA (not on OCP bc of this)  Urine preg neg today  UA neg today  RLQ ttp on exam.   Mild lightheadedness       ROS otherwise negative     Past medical, surgical, family and social history were reviewed, non-contributory, and unchanged unless otherwise stated. Objective:    /78   Pulse 85   Temp 97 °F (36.1 °C) (Temporal)   Resp 16   Ht 5' 9\" (1.753 m)   Wt 249 lb (112.9 kg)   SpO2 98%   BMI 36.77 kg/m²     Exam is as noted by resident with the following changes, additions or corrections:    General:  NAD; alert & oriented x 3   Heart:  RRR, no murmurs, gallops, or rubs. Lungs:  CTA bilaterally, no wheeze, rales or rhonchi  Abd: bowel sounds present, nontender, nondistended, no masses  Extrem:  No clubbing, cyanosis, or edema    Assessment/Plan:  AUB  Not a surgical abd on exam.   Urine preg neg  UA neg. Continue ibu per gyn rec. Call gyn office today for follow up. Had been discussing placing IUD. .. So may not be first episode of AUB. Attending Physician Statement  I have reviewed the chart, including any radiology or labs. I have discussed the case, including pertinent history and exam findings with the resident. I agree with the assessment, plan and orders as documented by the resident. Please refer to the resident note for additional information.       Electronically signed by Uriel Oscar MD on 12/15/2020 at 10:58 AM
Judgment normal.         Assessment/Plan:  1. Pelvic pain  UA in office showed blood only. No leukocyte esterase or nitrites noted. Pregnancy test negative. Physical exam not supportive of acute abdomen. Naproxen 500 mg Q 12 hours ordered. Advised patient to call OBGYN, Dr. Lewis, for follow-up soon. - POCT Urinalysis no Micro  - POCT urine pregnancy  - naproxen (NAPROSYN) 250 MG tablet; Take 2 tablets by mouth every 12 hours  Dispense: 60 tablet; Refill: 0      Return if symptoms worsen or fail to improve.     Sree Ryan DO, PGY-3

## 2020-12-17 ENCOUNTER — HOSPITAL ENCOUNTER (OUTPATIENT)
Age: 35
Discharge: HOME | End: 2020-12-17
Payer: COMMERCIAL

## 2020-12-17 LAB
GONADOTROPIN, CHORIONIC (HCG) QUANT: <0.1 MIU/ML
HCT VFR BLD CALC: 37.7 % (ref 34–48)
HEMOGLOBIN: 13 G/DL (ref 11.5–15.5)
MCH RBC QN AUTO: 31.6 PG (ref 26–35)
MCHC RBC AUTO-ENTMCNC: 34.5 % (ref 32–34.5)
MCV RBC AUTO: 91.5 FL (ref 80–99.9)
PDW BLD-RTO: 11.9 FL (ref 11.5–15)
PLATELET # BLD: 355 E9/L (ref 130–450)
PMV BLD AUTO: 9.9 FL (ref 7–12)
RBC # BLD: 4.12 E12/L (ref 3.5–5.5)
WBC # BLD: 8.4 E9/L (ref 4.5–11.5)

## 2020-12-17 PROCEDURE — 36415 COLL VENOUS BLD VENIPUNCTURE: CPT

## 2020-12-17 PROCEDURE — 84702 CHORIONIC GONADOTROPIN TEST: CPT

## 2020-12-17 PROCEDURE — 85027 COMPLETE CBC AUTOMATED: CPT

## 2021-02-09 ENCOUNTER — TELEPHONE (OUTPATIENT)
Dept: ADMINISTRATIVE | Age: 36
End: 2021-02-09

## 2021-02-09 NOTE — TELEPHONE ENCOUNTER
Patient called for appt this Thursday 2/11 with Dr Argueta January, she is exhausted, has no energy, bad headaches the last 2 wks, not feeling well, nausea at times; pcp unavailable, referred to [facility], gave location & office hours

## 2021-02-11 ENCOUNTER — HOSPITAL ENCOUNTER (OUTPATIENT)
Dept: ULTRASOUND IMAGING | Age: 36
Discharge: HOME | End: 2021-02-13
Payer: OTHER GOVERNMENT

## 2021-02-11 DIAGNOSIS — R10.2 PELVIC PAIN IN FEMALE: ICD-10-CM

## 2021-02-11 PROCEDURE — 76830 TRANSVAGINAL US NON-OB: CPT

## 2021-02-23 ENCOUNTER — VIRTUAL VISIT (OUTPATIENT)
Dept: FAMILY MEDICINE CLINIC | Age: 36
End: 2021-02-23
Payer: OTHER GOVERNMENT

## 2021-02-23 DIAGNOSIS — R51.9 NEW ONSET OF HEADACHES: Primary | ICD-10-CM

## 2021-02-23 DIAGNOSIS — J31.0 CHRONIC RHINITIS: ICD-10-CM

## 2021-02-23 DIAGNOSIS — R53.83 FATIGUE, UNSPECIFIED TYPE: ICD-10-CM

## 2021-02-23 PROCEDURE — G8427 DOCREV CUR MEDS BY ELIG CLIN: HCPCS | Performed by: FAMILY MEDICINE

## 2021-02-23 PROCEDURE — 99214 OFFICE O/P EST MOD 30 MIN: CPT | Performed by: FAMILY MEDICINE

## 2021-02-23 RX ORDER — NAPROXEN 250 MG/1
500 TABLET ORAL EVERY 12 HOURS
Qty: 60 TABLET | Refills: 0 | Status: CANCELLED | OUTPATIENT
Start: 2021-02-23

## 2021-02-23 RX ORDER — FLUTICASONE PROPIONATE 50 MCG
SPRAY, SUSPENSION (ML) NASAL
Qty: 1 BOTTLE | Refills: 3 | Status: SHIPPED
Start: 2021-02-23 | End: 2021-04-21

## 2021-02-23 RX ORDER — METHYLPREDNISOLONE 4 MG/1
TABLET ORAL
Qty: 1 KIT | Refills: 0 | Status: SHIPPED | OUTPATIENT
Start: 2021-02-23 | End: 2021-03-01

## 2021-02-23 NOTE — PROGRESS NOTES
Subjective:  37 y.o. y/o female is here for fatigue and headaches. Visit done via video telemedicine with Doxy. me due to current COVID-19 pandemic. Headaches and fatigue for past month, 5-8/10 pain  New script with glasses for couple weeks--no change  Increased water intake, 2200mL/day  Started taking vit D3, taking 2  6-8 hours/night of sleep  Vivid dreams, doesn't sleep well--couple weeks  Denies any chance of pregnancy  Appt with psych next week, Comprehensive (intake couple weeks ago), video appt tomorrow  Frontal headaches, intense vertically and then dulls, NSAIDS or tylenol every day, all day, progresses through the day, sleep helps, no aura, no relation to food, computers all day but no change, still with issues on the weekend  Dr. Mariah Noriega next month, new gyn, ongoing issues, recent U/S with small ovarian cysts  Labs OK in the Fall   No change in medications  No change in diet  No change in stress  Denies sinuses being dry from flonase      ROS otherwise reviewed as negative    Patient's past medical, surgical, social and/or family history reviewed, updated in chart, and are non-contributory (unless otherwise stated). Medications and allergies also reviewed and updated in chart. There were no vitals filed for this visit. There is no height or weight on file to calculate BMI. General:  Patient alert and oriented x 3, NAD, pleasant  HEENT:  Atraumatic, normocephalic  Neck:  Supple  Lungs: no resp distress      Assessment/Plan:    Rai was seen today for fatigue and headache. Diagnoses and all orders for this visit:    New onset of headaches, daily  -     methylPREDNISolone (MEDROL, NENA,) 4 MG tablet; Take as directed  -     MRI BRAIN WO CONTRAST;  Future  + Will do medrol dose pack   + Try to avoid NSAIDs for the week while on steroids, +tylenol prn  + Check MRI d/t new onset and severity    Fatigue  Likely multifactorial Sleep disturbances/daily headaches/stress/anxiety/depression/gyn hormones  Keep appts with psych and therapy  Evaluate/treat headaches  Further workup if continues to be an issue    Chronic rhinitis  -     fluticasone (FLONASE) 50 MCG/ACT nasal spray; INSTILL 1 SPRAY BY NASAL ROUTE DAILY          As above. Call or go to ED immediately if symptoms worsen or persist.  Follow up in 3 months regarding above and chronic issues, or sooner if necessary. Counseled regarding above diagnosis, including possible risks and complications,  especially if left uncontrolled. Counseled regarding the possible side effects, risks, benefits and alternatives to treatment; patient and/or guardian verbalizes understanding, agrees, feels comfortable with and wishes to proceed with above treatment plan. Advised patient to call with any new medication issues, and read all Rx info from pharmacy to assure aware of all possible risks and side effects of medication before taking. Patient and/or guardian verbalizes understanding and agrees with above counseling, assessment and plan. All questions answered.

## 2021-02-26 ENCOUNTER — TELEPHONE (OUTPATIENT)
Dept: FAMILY MEDICINE CLINIC | Age: 36
End: 2021-02-26

## 2021-02-26 NOTE — TELEPHONE ENCOUNTER
Patient called re: headaches. She states that her headaches are worse. She is experiencing blurred vision, auras and nausea. She wants to know if there is something else that can be done. I did call patient back and left her a detailed message advising that Dr Jing Lowe is not in the office on Friday's and she may not be able to address this message until Monday, therefore I advised that if her symptoms persist or get worse, that she is to go to the ED or walk-in care over the weekend.

## 2021-02-26 NOTE — TELEPHONE ENCOUNTER
Needs to be seen in the office or go to the ED. Needs to be evaluated and then can give injections to help abort her current headache and make plans for going forward. Thanks.

## 2021-03-03 ENCOUNTER — APPOINTMENT (OUTPATIENT)
Dept: CT IMAGING | Age: 36
End: 2021-03-03
Payer: OTHER GOVERNMENT

## 2021-03-03 ENCOUNTER — HOSPITAL ENCOUNTER (EMERGENCY)
Age: 36
Discharge: HOME | End: 2021-03-03
Attending: EMERGENCY MEDICINE
Payer: OTHER GOVERNMENT

## 2021-03-03 VITALS
SYSTOLIC BLOOD PRESSURE: 126 MMHG | RESPIRATION RATE: 16 BRPM | WEIGHT: 255 LBS | OXYGEN SATURATION: 100 % | DIASTOLIC BLOOD PRESSURE: 77 MMHG | HEIGHT: 69 IN | BODY MASS INDEX: 37.77 KG/M2 | TEMPERATURE: 97.3 F | HEART RATE: 80 BPM

## 2021-03-03 DIAGNOSIS — G43.819 OTHER MIGRAINE WITHOUT STATUS MIGRAINOSUS, INTRACTABLE: Primary | ICD-10-CM

## 2021-03-03 LAB
ANION GAP SERPL CALCULATED.3IONS-SCNC: 10 MMOL/L (ref 7–16)
BASOPHILS ABSOLUTE: 0.03 E9/L (ref 0–0.2)
BASOPHILS RELATIVE PERCENT: 0.3 % (ref 0–2)
BUN BLDV-MCNC: 9 MG/DL (ref 6–20)
CALCIUM SERPL-MCNC: 9 MG/DL (ref 8.6–10.2)
CHLORIDE BLD-SCNC: 104 MMOL/L (ref 98–107)
CO2: 24 MMOL/L (ref 22–29)
CREAT SERPL-MCNC: 0.7 MG/DL (ref 0.5–1)
EOSINOPHILS ABSOLUTE: 0.14 E9/L (ref 0.05–0.5)
EOSINOPHILS RELATIVE PERCENT: 1.6 % (ref 0–6)
GFR AFRICAN AMERICAN: >60
GFR NON-AFRICAN AMERICAN: >60 ML/MIN/1.73
GLUCOSE BLD-MCNC: 90 MG/DL (ref 74–99)
HCG, URINE, POC: NEGATIVE
HCT VFR BLD CALC: 40.3 % (ref 34–48)
HEMOGLOBIN: 13.2 G/DL (ref 11.5–15.5)
IMMATURE GRANULOCYTES #: 0.02 E9/L
IMMATURE GRANULOCYTES %: 0.2 % (ref 0–5)
LYMPHOCYTES ABSOLUTE: 2.46 E9/L (ref 1.5–4)
LYMPHOCYTES RELATIVE PERCENT: 28.4 % (ref 20–42)
Lab: NORMAL
MCH RBC QN AUTO: 30.4 PG (ref 26–35)
MCHC RBC AUTO-ENTMCNC: 32.8 % (ref 32–34.5)
MCV RBC AUTO: 92.9 FL (ref 80–99.9)
MONOCYTES ABSOLUTE: 0.53 E9/L (ref 0.1–0.95)
MONOCYTES RELATIVE PERCENT: 6.1 % (ref 2–12)
NEGATIVE QC PASS/FAIL: NORMAL
NEUTROPHILS ABSOLUTE: 5.49 E9/L (ref 1.8–7.3)
NEUTROPHILS RELATIVE PERCENT: 63.4 % (ref 43–80)
PDW BLD-RTO: 11.6 FL (ref 11.5–15)
PLATELET # BLD: 323 E9/L (ref 130–450)
PMV BLD AUTO: 9.7 FL (ref 7–12)
POSITIVE QC PASS/FAIL: NORMAL
POTASSIUM REFLEX MAGNESIUM: 4 MMOL/L (ref 3.5–5)
RBC # BLD: 4.34 E12/L (ref 3.5–5.5)
SODIUM BLD-SCNC: 138 MMOL/L (ref 132–146)
WBC # BLD: 8.7 E9/L (ref 4.5–11.5)

## 2021-03-03 PROCEDURE — 6360000004 HC RX CONTRAST MEDICATION: Performed by: RADIOLOGY

## 2021-03-03 PROCEDURE — 85025 COMPLETE CBC W/AUTO DIFF WBC: CPT

## 2021-03-03 PROCEDURE — 70496 CT ANGIOGRAPHY HEAD: CPT

## 2021-03-03 PROCEDURE — 70450 CT HEAD/BRAIN W/O DYE: CPT

## 2021-03-03 PROCEDURE — 80048 BASIC METABOLIC PNL TOTAL CA: CPT

## 2021-03-03 PROCEDURE — 99284 EMERGENCY DEPT VISIT MOD MDM: CPT

## 2021-03-03 PROCEDURE — 6360000002 HC RX W HCPCS: Performed by: PHYSICIAN ASSISTANT

## 2021-03-03 PROCEDURE — 2580000003 HC RX 258: Performed by: PHYSICIAN ASSISTANT

## 2021-03-03 RX ORDER — 0.9 % SODIUM CHLORIDE 0.9 %
1000 INTRAVENOUS SOLUTION INTRAVENOUS ONCE
Status: COMPLETED | OUTPATIENT
Start: 2021-03-03 | End: 2021-03-03

## 2021-03-03 RX ORDER — KETOROLAC TROMETHAMINE 30 MG/ML
15 INJECTION, SOLUTION INTRAMUSCULAR; INTRAVENOUS ONCE
Status: COMPLETED | OUTPATIENT
Start: 2021-03-03 | End: 2021-03-03

## 2021-03-03 RX ORDER — METOCLOPRAMIDE HYDROCHLORIDE 5 MG/ML
10 INJECTION INTRAMUSCULAR; INTRAVENOUS ONCE
Status: COMPLETED | OUTPATIENT
Start: 2021-03-03 | End: 2021-03-03

## 2021-03-03 RX ORDER — DIPHENHYDRAMINE HYDROCHLORIDE 50 MG/ML
25 INJECTION INTRAMUSCULAR; INTRAVENOUS ONCE
Status: COMPLETED | OUTPATIENT
Start: 2021-03-03 | End: 2021-03-03

## 2021-03-03 RX ADMIN — SODIUM CHLORIDE 1000 ML: 9 INJECTION, SOLUTION INTRAVENOUS at 13:27

## 2021-03-03 RX ADMIN — IOPAMIDOL 75 ML: 755 INJECTION, SOLUTION INTRAVENOUS at 16:09

## 2021-03-03 RX ADMIN — DIPHENHYDRAMINE HYDROCHLORIDE 25 MG: 50 INJECTION, SOLUTION INTRAMUSCULAR; INTRAVENOUS at 13:24

## 2021-03-03 RX ADMIN — KETOROLAC TROMETHAMINE 15 MG: 30 INJECTION, SOLUTION INTRAMUSCULAR at 13:24

## 2021-03-03 RX ADMIN — METOCLOPRAMIDE 10 MG: 5 INJECTION, SOLUTION INTRAMUSCULAR; INTRAVENOUS at 13:24

## 2021-03-03 ASSESSMENT — PAIN SCALES - GENERAL: PAINLEVEL_OUTOF10: 9

## 2021-03-03 ASSESSMENT — ENCOUNTER SYMPTOMS
NAUSEA: 0
COLOR CHANGE: 0
FACIAL SWELLING: 0
SHORTNESS OF BREATH: 0
VOMITING: 0
CONSTIPATION: 0
ABDOMINAL PAIN: 0
BACK PAIN: 0
DIARRHEA: 0
SORE THROAT: 0
TROUBLE SWALLOWING: 0
SINUS PAIN: 0
COUGH: 0
SINUS PRESSURE: 0
CHEST TIGHTNESS: 0

## 2021-03-03 ASSESSMENT — PAIN DESCRIPTION - DESCRIPTORS: DESCRIPTORS: CONSTANT;ACHING;HEADACHE

## 2021-03-03 ASSESSMENT — PAIN DESCRIPTION - LOCATION: LOCATION: HEAD

## 2021-03-03 ASSESSMENT — PAIN DESCRIPTION - FREQUENCY: FREQUENCY: CONTINUOUS

## 2021-03-03 NOTE — ED PROVIDER NOTES
The patient is a 79-year-old female with a history of asthma presenting to the emergency department with new onset frontal headaches. They have been present for about a month and a half. She states they are continuous and constant every single day. She describes it as sharp pains and pressure in the left side of her forehead. Patient states she has been taking Tylenol, Excedrin, Aleve and Tylenol every day without any improvement. She did have a virtual visit with her primary care physician and was prescribed a steroid which also provided no relief. Patient also saw her eye doctor and got new glasses but states there has been no change in the headaches. Patient is scheduled to have an MRI next week. She states she is having severe nausea as well. The headaches are not waking her up at night but she is having \"vivid abnormal dreams. \"  Patient denies any head injury. She also denies any vision changes, fevers or chills, neck pain or stiffness, dizziness. The history is provided by the patient. No  was used. REVIEW OF SYSTEMS:  Review of Systems   Constitutional: Negative for activity change, chills, fatigue and fever. HENT: Negative for congestion, ear pain, facial swelling, sinus pressure, sinus pain, sore throat and trouble swallowing. Respiratory: Negative for cough, chest tightness and shortness of breath. Cardiovascular: Negative for chest pain, palpitations and leg swelling. Gastrointestinal: Negative for abdominal pain, constipation, diarrhea, nausea and vomiting. Genitourinary: Negative for dysuria, flank pain, frequency and hematuria. Musculoskeletal: Negative for back pain, neck pain and neck stiffness. Skin: Negative for color change, pallor and rash. Neurological: Positive for headaches. Negative for dizziness, weakness and light-headedness. Psychiatric/Behavioral: Negative for agitation, behavioral problems and confusion.       Pertinent positives and negatives are stated within HPI, all other systems reviewed and are negative.      --------------------------------------------- PAST HISTORY ---------------------------------------------  Past Medical History:  has a past medical history of Arthritis, Asthma, and Lesion of mouth. Past Surgical History:  has a past surgical history that includes Tonsillectomy (2004); laparoscopy; [place] tooth extraction; other surgical history (Right, 5/2/2016); Cholecystectomy, laparoscopic (2010); Upper gastrointestinal endoscopy; and Colonoscopy. Social History:  reports that she has never smoked. She has never used smokeless tobacco. She reports current alcohol use. She reports that she does not use drugs. Family History: family history includes Breast Cancer (age of onset: 35) in her mother; Breast Cancer (age of onset: 28) in her maternal cousin; Breast Cancer (age of onset: 39) in her maternal cousin; Cancer in her maternal aunt; High Blood Pressure in her mother; No Known Problems in her brother, father, maternal grandmother, and sister; Pancreatic Cancer (age of onset: 48) in her maternal grandfather; Thyroid Cancer (age of onset: 44) in her mother. The patients home medications have been reviewed.     Allergies: Bupropion and Seasonal    -------------------------------------------------- RESULTS -------------------------------------------------  All laboratory and radiology results have been personally reviewed by myself   LABS:  Results for orders placed or performed during the hospital encounter of 77/16/87   Basic Metabolic Panel w/ Reflex to MG   Result Value Ref Range    Sodium 138 132 - 146 mmol/L    Potassium reflex Magnesium 4.0 3.5 - 5.0 mmol/L    Chloride 104 98 - 107 mmol/L    CO2 24 22 - 29 mmol/L    Anion Gap 10 7 - 16 mmol/L    Glucose 90 74 - 99 mg/dL    BUN 9 6 - 20 mg/dL    CREATININE 0.7 0.5 - 1.0 mg/dL    GFR Non-African American >60 >=60 mL/min/1.73    GFR African American >60 Calcium 9.0 8.6 - 10.2 mg/dL   CBC Auto Differential   Result Value Ref Range    WBC 8.7 4.5 - 11.5 E9/L    RBC 4.34 3.50 - 5.50 E12/L    Hemoglobin 13.2 11.5 - 15.5 g/dL    Hematocrit 40.3 34.0 - 48.0 %    MCV 92.9 80.0 - 99.9 fL    MCH 30.4 26.0 - 35.0 pg    MCHC 32.8 32.0 - 34.5 %    RDW 11.6 11.5 - 15.0 fL    Platelets 619 253 - 007 E9/L    MPV 9.7 7.0 - 12.0 fL    Neutrophils % 63.4 43.0 - 80.0 %    Immature Granulocytes % 0.2 0.0 - 5.0 %    Lymphocytes % 28.4 20.0 - 42.0 %    Monocytes % 6.1 2.0 - 12.0 %    Eosinophils % 1.6 0.0 - 6.0 %    Basophils % 0.3 0.0 - 2.0 %    Neutrophils Absolute 5.49 1.80 - 7.30 E9/L    Immature Granulocytes # 0.02 E9/L    Lymphocytes Absolute 2.46 1.50 - 4.00 E9/L    Monocytes Absolute 0.53 0.10 - 0.95 E9/L    Eosinophils Absolute 0.14 0.05 - 0.50 E9/L    Basophils Absolute 0.03 0.00 - 0.20 E9/L   POC Pregnancy Urine   Result Value Ref Range    HCG, Urine, POC Negative Negative    Lot Number [id number]     Positive QC Pass/Fail Pass     Negative QC Pass/Fail Pass        RADIOLOGY:  Interpreted by Radiologist.  CTA HEAD W CONTRAST   Final Result   No evidence of intracranial aneurysm or AVM. CT HEAD WO CONTRAST   Final Result   No acute intracranial abnormality. Question tiny aneurysm or ectasia of the right ICA terminus. Consider MRA   when feasible.             ------------------------- NURSING NOTES AND VITALS REVIEWED ---------------------------   The nursing notes within the ED encounter and vital signs as below have been reviewed. /77   Pulse 80   Temp 97.3 °F (36.3 °C)   Resp 16   Ht 5' 9\" (1.753 m)   Wt 255 lb (115.7 kg)   SpO2 100%   BMI 37.66 kg/m²   Oxygen Saturation Interpretation: Normal      ---------------------------------------------------PHYSICAL EXAM--------------------------------------    Physical Exam  Vitals signs and nursing note reviewed. Constitutional:       General: She is not in acute distress.      Appearance: Normal appearance. She is well-developed. She is not ill-appearing. HENT:      Head: Normocephalic and atraumatic. Mouth/Throat:      Mouth: Mucous membranes are moist.      Pharynx: Oropharynx is clear. Eyes:      General:         Right eye: No discharge. Left eye: No discharge. Extraocular Movements: Extraocular movements intact. Conjunctiva/sclera: Conjunctivae normal.      Pupils: Pupils are equal, round, and reactive to light. Neck:      Musculoskeletal: Normal range of motion and neck supple. Vascular: No JVD. Trachea: No tracheal deviation. Cardiovascular:      Rate and Rhythm: Normal rate and regular rhythm. Heart sounds: Normal heart sounds. No murmur. Pulmonary:      Effort: Pulmonary effort is normal. No respiratory distress. Breath sounds: Normal breath sounds. Musculoskeletal: Normal range of motion. General: No tenderness or deformity. Skin:     General: Skin is warm and dry. Capillary Refill: Capillary refill takes less than 2 seconds. Coloration: Skin is not pale. Findings: No erythema. Neurological:      Mental Status: She is alert and oriented to person, place, and time. Motor: No weakness. Comments: CN III-XII intact. Psychiatric:         Mood and Affect: Mood normal.         Behavior: Behavior normal.         Thought Content:  Thought content normal.            ------------------------------ ED COURSE/MEDICAL DECISION MAKING----------------------  Medications   ketorolac (TORADOL) injection 15 mg (15 mg Intravenous Given 3/3/21 1324)   diphenhydrAMINE (BENADRYL) injection 25 mg (25 mg Intravenous Given 3/3/21 1324)   metoclopramide (REGLAN) injection 10 mg (10 mg Intravenous Given 3/3/21 1324)   0.9 % sodium chloride bolus (0 mLs Intravenous Stopped 3/3/21 1511)   iopamidol (ISOVUE-370) 76 % injection 75 mL (75 mLs Intravenous Given 3/3/21 1609)         ED COURSE:  ED Course as of Mar 10 0619   Wed Mar 03, 2021 1600 Patient is a 79-year-old female presenting with a waxing and waning headache which has been present for approximately 1.5 months. No recent falls or trauma. No neck pain or recent illness. No fevers. States she has been taking over-the-counter medication for her headache nearly daily for the last month. Followed up with her PCP, and she has an outpatient MRI of her brain scheduled for next week. Patient was well-appearing on exam.  No nuchal rigidity or meningeal signs. Facial symmetry. Speech clear. Cranial nerves intact. No focal motor or sensory deficits. No ataxia with finger-to-nose or heel-to-shin. No drift in upper or lower extremities. No nystagmus. Head CT read as questionable aneurysm. Plan to obtain CTA and reevaluate. [JA]      ED Course User Index  [JA] Peter Ambrocio MD     CTA HEAD W CONTRAST   Final Result   No evidence of intracranial aneurysm or AVM. CT HEAD WO CONTRAST   Final Result   No acute intracranial abnormality. Question tiny aneurysm or ectasia of the right ICA terminus. Consider MRA   when feasible. Procedures:  Procedures     Medical Decision Making:   MDM   79-year-old female presenting to the ED with new onset headaches that are constant and persistent over the last month and a half. Patient has no neurological deficits. Labs are unremarkable. Initial noncontrast CT question an aneurysm of the distal ICA. CTA of the head was unremarkable and did not show any evidence of an aneurysm. This is likely new persistent migraines. Patient is encouraged to continue over-the-counter medications and follow-up with her PCP. She is also advised to keep her appointment for her MRI. She is return with any worsening symptoms. Counseling:    The emergency provider has spoken with the patient and discussed todays results, in addition to providing specific details for the plan of care and counseling regarding the diagnosis and prognosis. Questions are answered at this time and they are agreeable with the plan.      --------------------------------- IMPRESSION AND DISPOSITION ---------------------------------    IMPRESSION  1. Other migraine without status migrainosus, intractable        DISPOSITION  Disposition: Discharge to home  Patient condition is good      Electronically signed by Williams Rivas MD   DD: 3/3/21  **This report was transcribed using voice recognition software. Every effort was made to ensure accuracy; however, inadvertent computerized transcription errors may be present.   END OF ED PROVIDER NOTE         Joselyn Davis PA-C  03/03/21 [id number]       Williams Rivas MD  03/10/21 [id number]

## 2021-03-10 ENCOUNTER — OFFICE VISIT (OUTPATIENT)
Dept: FAMILY MEDICINE CLINIC | Age: 36
End: 2021-03-10
Payer: OTHER GOVERNMENT

## 2021-03-10 ENCOUNTER — HOSPITAL ENCOUNTER (OUTPATIENT)
Age: 36
Discharge: HOME | End: 2021-03-10
Payer: OTHER GOVERNMENT

## 2021-03-10 ENCOUNTER — HOSPITAL ENCOUNTER (OUTPATIENT)
Dept: MRI IMAGING | Age: 36
Discharge: HOME | End: 2021-03-12
Payer: OTHER GOVERNMENT

## 2021-03-10 VITALS
TEMPERATURE: 98.1 F | RESPIRATION RATE: 16 BRPM | DIASTOLIC BLOOD PRESSURE: 76 MMHG | HEIGHT: 69 IN | SYSTOLIC BLOOD PRESSURE: 110 MMHG | OXYGEN SATURATION: 99 % | HEART RATE: 112 BPM | BODY MASS INDEX: 37.68 KG/M2 | WEIGHT: 254.4 LBS

## 2021-03-10 DIAGNOSIS — R51.9 DAILY HEADACHE: Primary | ICD-10-CM

## 2021-03-10 DIAGNOSIS — R51.9 NEW ONSET OF HEADACHES: ICD-10-CM

## 2021-03-10 DIAGNOSIS — G93.5 CHIARI MALFORMATION TYPE I (HCC): ICD-10-CM

## 2021-03-10 PROCEDURE — 1036F TOBACCO NON-USER: CPT | Performed by: FAMILY MEDICINE

## 2021-03-10 PROCEDURE — 99214 OFFICE O/P EST MOD 30 MIN: CPT | Performed by: FAMILY MEDICINE

## 2021-03-10 PROCEDURE — G8417 CALC BMI ABV UP PARAM F/U: HCPCS | Performed by: FAMILY MEDICINE

## 2021-03-10 PROCEDURE — G8484 FLU IMMUNIZE NO ADMIN: HCPCS | Performed by: FAMILY MEDICINE

## 2021-03-10 PROCEDURE — G8427 DOCREV CUR MEDS BY ELIG CLIN: HCPCS | Performed by: FAMILY MEDICINE

## 2021-03-10 PROCEDURE — 70551 MRI BRAIN STEM W/O DYE: CPT

## 2021-03-10 RX ORDER — CALCIUM CARBONATE/VITAMIN D3 500-10/5ML
1 LIQUID (ML) ORAL DAILY
Qty: 30 CAPSULE | Refills: 5 | Status: SHIPPED
Start: 2021-03-10 | End: 2021-09-07

## 2021-03-10 RX ORDER — LISDEXAMFETAMINE DIMESYLATE 40 MG/1
1 CAPSULE ORAL DAILY
COMMUNITY
Start: 2021-03-04 | End: 2021-05-18 | Stop reason: DRUGHIGH

## 2021-03-10 NOTE — PROGRESS NOTES
Subjective:  37 y.o. y/o female is here with complaints of ongoing daily headaches. Now 6 weeks  Minor pain in the morning, progressively worse throughout the day, incapacitating at night, 5-8/10 pain  Mostly on right side/temple  Sleep helps  Been trying to not take anything  Finished medrol dose pack  Off the daily NSAIDS she had before  Increased water  Tried changing snacks to things thought to decrease inflammation  Dreams better  MRI done this AM, Chiari malformation type 1 seen with no other abnormalities, no previous MRI for comparison--thinks daughter has same finding  New script with glasses for few weeks--no change  Does have episodic blurry vision  6-8 hours/night of sleep  Vyvanse added to lamictal, thought of ADHD and not bipolar, Comprehensive    Appt with Dr. Sia Braswell today, ibuprofen 800mg for cramping, mammogram and BRCA testing planned    ROS otherwise unchanged    Patient's past medical, surgical, social and/or family history reviewed, updated in chart, and are non-contributory (unless otherwise stated). Medications and allergies also reviewed and updated in chart. Vitals:    03/10/21 1127   BP: 110/76   Site: Left Upper Arm   Position: Sitting   Cuff Size: Large Adult   Pulse: 112   Resp: 16   Temp: 98.1 °F (36.7 °C)   TempSrc: Temporal   SpO2: 99%   Weight: 254 lb 6.4 oz (115.4 kg)   Height: 5' 9\" (1.753 m)     Body mass index is 37.57 kg/m². General:  Patient alert and oriented x 3, NAD, pleasant  HEENT:  Atraumatic, normocephalic, no tenderness, PERRLA, EOMI, clear conjunctiva, throat - no erythema, MMM  Neck:  Supple, no goiter, no carotid bruits, no LAD  Lungs:  no resp distress  Neuro:  No focal neurologic deficits, strength upper and lower extremities 5/5 and equal, DTR 2+ b/l, CN 2-12 grossly intact, cerebellar exam normal  Extremities:  No clubbing, cyanosis or edema  Skin: unremarkable    Assessment/Plan:      Bard Og was seen today for migraine.     Diagnoses and all orders for this visit:    Daily headache  -     Luz Maria Weinstein MD, Neurosurgery, [address]  -     Magnesium Oxide 400 MG CAPS; Take 1 capsule by mouth daily  -     Riboflavin 100 MG TABS; Take 200 mg by mouth daily  + Will try daily mag and riboflavin as migraine prophylaxis.  + OK to take tylenol prn  + Neurosurg referral to help decide if headaches/symptoms are related to malformation found or not.  + Continue with water, exercise, healthy diet. New onset of headaches  -     Luz Maria Weinstein MD, Neurosurgery, [place]    Chiari malformation type I [facility])  -     Luz Maria Weinstein MD, Neurosurgery, [address]      As above. Call or go to ED immediately if symptoms worsen or persist.  Return in about 2 months (around 5/10/2021) for vv., or sooner if necessary. Educational materials and/or home exercises printed for patient's review and were included in patient instructions on his/her After Visit Summary and given to patient at the end of visit. Counseled regarding above diagnosis, including possible risks and complications,  especially if left uncontrolled. Counseled regarding the possible side effects, risks, benefits and alternatives to treatment; patient and/or guardian verbalizes understanding, agrees, feels comfortable with and wishes to proceed with above treatment plan. Advised patient to call with any new medication issues, and read all Rx info from pharmacy to assure aware of all possible risks and side effects of medication before taking. Reviewed age and gender appropriate health screening exams and vaccinations. Advised patient regarding importance of keeping up with recommended health maintenance and to schedule as soon as possible if overdue, as this is important in assessing for undiagnosed pathology, especially cancer, as well as protecting against potentially harmful/life threatening disease.         Patient and/or guardian verbalizes understanding and agrees with above counseling, assessment and plan. All questions answered.

## 2021-03-22 RX ORDER — OXCARBAZEPINE 300 MG/1
TABLET, FILM COATED ORAL
Qty: 180 TABLET | Refills: 1 | Status: SHIPPED
Start: 2021-03-22 | End: 2021-06-02

## 2021-03-24 ENCOUNTER — HOSPITAL ENCOUNTER (OUTPATIENT)
Age: 36
Discharge: HOME | End: 2021-03-24
Payer: OTHER GOVERNMENT

## 2021-03-24 ENCOUNTER — HOSPITAL ENCOUNTER (OUTPATIENT)
Dept: GENERAL RADIOLOGY | Age: 36
Discharge: HOME | End: 2021-03-26
Payer: OTHER GOVERNMENT

## 2021-03-24 DIAGNOSIS — Z12.31 ENCOUNTER FOR SCREENING MAMMOGRAM FOR MALIGNANT NEOPLASM OF BREAST: ICD-10-CM

## 2021-03-24 LAB
CARBAMAZEPINE DOSE: ABNORMAL
CARBAMAZEPINE LEVEL: <2 MCG/ML (ref 4–10)

## 2021-03-24 PROCEDURE — 77063 BREAST TOMOSYNTHESIS BI: CPT

## 2021-03-24 PROCEDURE — 80156 ASSAY CARBAMAZEPINE TOTAL: CPT

## 2021-03-24 PROCEDURE — 36415 COLL VENOUS BLD VENIPUNCTURE: CPT

## 2021-03-26 ENCOUNTER — TELEPHONE (OUTPATIENT)
Dept: GENERAL RADIOLOGY | Age: 36
End: 2021-03-26

## 2021-03-26 NOTE — TELEPHONE ENCOUNTER
Call to patient in reference to her mammogram performed at [facility] on March 24, 2021. Instructed patient that the radiologist has recommended some additional breast imaging, in order to make a final determination/result. Verbalizes understanding and is agreeable to proceed.        Gary Whitfield, RN, BSN, [address]

## 2021-03-31 ENCOUNTER — HOSPITAL ENCOUNTER (OUTPATIENT)
Dept: GENERAL RADIOLOGY | Age: 36
Discharge: HOME | End: 2021-04-02
Payer: OTHER GOVERNMENT

## 2021-03-31 DIAGNOSIS — R92.8 ABNORMAL MAMMOGRAM: ICD-10-CM

## 2021-03-31 PROCEDURE — 77065 DX MAMMO INCL CAD UNI: CPT

## 2021-03-31 PROCEDURE — 76642 ULTRASOUND BREAST LIMITED: CPT

## 2021-04-05 ENCOUNTER — HOSPITAL ENCOUNTER (OUTPATIENT)
Dept: GENERAL RADIOLOGY | Age: 36
Discharge: HOME | End: 2021-04-07
Payer: OTHER GOVERNMENT

## 2021-04-05 ENCOUNTER — TELEPHONE (OUTPATIENT)
Dept: FAMILY MEDICINE CLINIC | Age: 36
End: 2021-04-05

## 2021-04-05 DIAGNOSIS — N63.0 BREAST MASS IN FEMALE: Primary | ICD-10-CM

## 2021-04-05 DIAGNOSIS — N63.0 BREAST MASS IN FEMALE: ICD-10-CM

## 2021-04-05 PROCEDURE — V2632 POST CHMBR INTRAOCULAR LENS: HCPCS

## 2021-04-05 PROCEDURE — 2500000003 HC RX 250 WO HCPCS

## 2021-04-05 PROCEDURE — 88305 TISSUE EXAM BY PATHOLOGIST: CPT

## 2021-04-05 PROCEDURE — 77065 DX MAMMO INCL CAD UNI: CPT

## 2021-04-05 PROCEDURE — A4648 IMPLANTABLE TISSUE MARKER: HCPCS

## 2021-04-05 NOTE — PROGRESS NOTES
Met with patient prior to her breast biopsy. Instructed on  breast biopsy procedure. Denies use of blood thinners or aspirin products within the past 5 days. I remained with her during the biopsy. She tolerated biopsy well. Instructed that results will be available in approximately 3-5 business days and to follow up with Dr. Armaan Briones to receive results. Instructed that Dr. Nam Arora will also be notified of results. Provided with folder containing my contact information, monthly breast self exam card, and post biopsy discharge instructions. Instructed to call me if she has any questions or concerns about her biopsy. Verbalizes understanding.  Rekha RN, OCN, CN-BN

## 2021-04-05 NOTE — TELEPHONE ENCOUNTER
Patient is scheduled for a right breast stereotactic breast biopsy this morning and Elba Loredo needs an order for the exam.  The [facility] code is 2172. They are unable to get the code from her OB/Gyn doctor as they are out of the office.

## 2021-04-20 ENCOUNTER — PATIENT MESSAGE (OUTPATIENT)
Dept: FAMILY MEDICINE CLINIC | Age: 36
End: 2021-04-20

## 2021-04-20 DIAGNOSIS — F40.240 CLAUSTROPHOBIA: Primary | ICD-10-CM

## 2021-04-20 DIAGNOSIS — J31.0 CHRONIC RHINITIS: ICD-10-CM

## 2021-04-20 RX ORDER — LORAZEPAM 1 MG/1
TABLET ORAL
Qty: 2 TABLET | Refills: 0 | Status: SHIPPED | OUTPATIENT
Start: 2021-04-20 | End: 2021-04-27

## 2021-04-21 RX ORDER — FLUTICASONE PROPIONATE 50 MCG
SPRAY, SUSPENSION (ML) NASAL
Qty: 1 BOTTLE | Refills: 3 | Status: SHIPPED
Start: 2021-04-21 | End: 2021-08-24

## 2021-04-26 DIAGNOSIS — F40.240 CLAUSTROPHOBIA: ICD-10-CM

## 2021-04-26 RX ORDER — LORAZEPAM 1 MG/1
TABLET ORAL
Qty: 2 TABLET | Refills: 0 | Status: CANCELLED | OUTPATIENT
Start: 2021-04-26 | End: 2021-05-03

## 2021-04-27 ENCOUNTER — HOSPITAL ENCOUNTER (OUTPATIENT)
Dept: MRI IMAGING | Age: 36
Discharge: HOME | End: 2021-04-29
Payer: OTHER GOVERNMENT

## 2021-04-27 ENCOUNTER — HOSPITAL ENCOUNTER (OUTPATIENT)
Age: 36
Discharge: HOME | End: 2021-04-27
Payer: OTHER GOVERNMENT

## 2021-04-27 ENCOUNTER — INITIAL CONSULT (OUTPATIENT)
Dept: NEUROSURGERY | Age: 36
End: 2021-04-27
Payer: OTHER GOVERNMENT

## 2021-04-27 VITALS
DIASTOLIC BLOOD PRESSURE: 77 MMHG | SYSTOLIC BLOOD PRESSURE: 118 MMHG | WEIGHT: 245 LBS | HEIGHT: 69 IN | BODY MASS INDEX: 36.29 KG/M2 | HEART RATE: 86 BPM

## 2021-04-27 DIAGNOSIS — G44.001 INTRACTABLE CLUSTER HEADACHE SYNDROME, UNSPECIFIED CHRONICITY PATTERN: Primary | ICD-10-CM

## 2021-04-27 DIAGNOSIS — Z91.89 AT HIGH RISK FOR BREAST CANCER: ICD-10-CM

## 2021-04-27 LAB
ALBUMIN SERPL-MCNC: 4.6 G/DL (ref 3.5–5.2)
ALP BLD-CCNC: 46 U/L (ref 35–104)
ALT SERPL-CCNC: 13 U/L (ref 0–32)
ANION GAP SERPL CALCULATED.3IONS-SCNC: 19 MMOL/L (ref 7–16)
AST SERPL-CCNC: 14 U/L (ref 0–31)
BASOPHILS ABSOLUTE: 0.04 E9/L (ref 0–0.2)
BASOPHILS RELATIVE PERCENT: 0.5 % (ref 0–2)
BILIRUB SERPL-MCNC: 0.3 MG/DL (ref 0–1.2)
BUN BLDV-MCNC: 11 MG/DL (ref 6–20)
CALCIUM SERPL-MCNC: 10.3 MG/DL (ref 8.6–10.2)
CHLORIDE BLD-SCNC: 107 MMOL/L (ref 98–107)
CO2: 20 MMOL/L (ref 22–29)
CREAT SERPL-MCNC: 0.8 MG/DL (ref 0.5–1)
EOSINOPHILS ABSOLUTE: 0.17 E9/L (ref 0.05–0.5)
EOSINOPHILS RELATIVE PERCENT: 2.1 % (ref 0–6)
GFR AFRICAN AMERICAN: >60
GFR NON-AFRICAN AMERICAN: >60 ML/MIN/1.73
GLUCOSE BLD-MCNC: 100 MG/DL (ref 74–99)
HCT VFR BLD CALC: 39.3 % (ref 34–48)
HEMOGLOBIN: 13.6 G/DL (ref 11.5–15.5)
IMMATURE GRANULOCYTES #: 0.01 E9/L
IMMATURE GRANULOCYTES %: 0.1 % (ref 0–5)
LYMPHOCYTES ABSOLUTE: 1.75 E9/L (ref 1.5–4)
LYMPHOCYTES RELATIVE PERCENT: 21.2 % (ref 20–42)
MCH RBC QN AUTO: 31.3 PG (ref 26–35)
MCHC RBC AUTO-ENTMCNC: 34.6 % (ref 32–34.5)
MCV RBC AUTO: 90.6 FL (ref 80–99.9)
MONOCYTES ABSOLUTE: 0.53 E9/L (ref 0.1–0.95)
MONOCYTES RELATIVE PERCENT: 6.4 % (ref 2–12)
NEUTROPHILS ABSOLUTE: 5.75 E9/L (ref 1.8–7.3)
NEUTROPHILS RELATIVE PERCENT: 69.7 % (ref 43–80)
PDW BLD-RTO: 11.3 FL (ref 11.5–15)
PLATELET # BLD: 332 E9/L (ref 130–450)
PMV BLD AUTO: 10.3 FL (ref 7–12)
POTASSIUM SERPL-SCNC: 3.7 MMOL/L (ref 3.5–5)
RBC # BLD: 4.34 E12/L (ref 3.5–5.5)
SODIUM BLD-SCNC: 146 MMOL/L (ref 132–146)
T4 FREE: 1.29 NG/DL (ref 0.93–1.7)
TOTAL PROTEIN: 7.7 G/DL (ref 6.4–8.3)
TSH SERPL DL<=0.05 MIU/L-ACNC: 0.57 UIU/ML (ref 0.27–4.2)
WBC # BLD: 8.3 E9/L (ref 4.5–11.5)

## 2021-04-27 PROCEDURE — 36415 COLL VENOUS BLD VENIPUNCTURE: CPT

## 2021-04-27 PROCEDURE — 84443 ASSAY THYROID STIM HORMONE: CPT

## 2021-04-27 PROCEDURE — 77049 MRI BREAST C-+ W/CAD BI: CPT

## 2021-04-27 PROCEDURE — A9585 GADOBUTROL INJECTION: HCPCS | Performed by: RADIOLOGY

## 2021-04-27 PROCEDURE — 80053 COMPREHEN METABOLIC PANEL: CPT

## 2021-04-27 PROCEDURE — G8417 CALC BMI ABV UP PARAM F/U: HCPCS | Performed by: NEUROLOGICAL SURGERY

## 2021-04-27 PROCEDURE — 1036F TOBACCO NON-USER: CPT | Performed by: NEUROLOGICAL SURGERY

## 2021-04-27 PROCEDURE — 99204 OFFICE O/P NEW MOD 45 MIN: CPT | Performed by: NEUROLOGICAL SURGERY

## 2021-04-27 PROCEDURE — 6360000004 HC RX CONTRAST MEDICATION: Performed by: RADIOLOGY

## 2021-04-27 PROCEDURE — 84439 ASSAY OF FREE THYROXINE: CPT

## 2021-04-27 PROCEDURE — 80183 DRUG SCRN QUANT OXCARBAZEPIN: CPT

## 2021-04-27 PROCEDURE — G8427 DOCREV CUR MEDS BY ELIG CLIN: HCPCS | Performed by: NEUROLOGICAL SURGERY

## 2021-04-27 PROCEDURE — 85025 COMPLETE CBC W/AUTO DIFF WBC: CPT

## 2021-04-27 RX ORDER — IBUPROFEN 800 MG/1
800 TABLET ORAL EVERY 6 HOURS PRN
COMMUNITY

## 2021-04-27 RX ADMIN — GADOBUTROL 10 ML: 604.72 INJECTION INTRAVENOUS at 14:20

## 2021-04-27 ASSESSMENT — ENCOUNTER SYMPTOMS
CHANGE IN BOWEL HABIT: 0
SHORTNESS OF BREATH: 1
SORE THROAT: 0
VISUAL CHANGE: 1
SWOLLEN GLANDS: 0
ABDOMINAL PAIN: 0
EYES NEGATIVE: 1
VOMITING: 1
DIARRHEA: 1
COUGH: 0
CONSTIPATION: 1
ALLERGIC/IMMUNOLOGIC NEGATIVE: 1
NAUSEA: 1

## 2021-04-27 NOTE — PROGRESS NOTES
pain.   Skin: Negative. Negative for rash. Allergic/Immunologic: Negative. Neurological: Positive for headaches. Negative for vertigo, weakness and numbness. Hematological: Bruises/bleeds easily. Psychiatric/Behavioral: Positive for dysphoric mood. The patient is nervous/anxious. Physical Exam  Vitals signs reviewed. Constitutional:       General: She is not in acute distress. Appearance: Normal appearance. She is not ill-appearing, toxic-appearing or diaphoretic. HENT:      Head: Normocephalic and atraumatic. Nose: Nose normal.   Eyes:      General: No visual field deficit or scleral icterus. Right eye: No discharge. Left eye: No discharge. Extraocular Movements: Extraocular movements intact. Conjunctiva/sclera: Conjunctivae normal.      Pupils: Pupils are equal, round, and reactive to light. Pulmonary:      Effort: Pulmonary effort is normal. No respiratory distress. Abdominal:      General: Abdomen is flat. There is no distension. Musculoskeletal: Normal range of motion. General: No swelling, tenderness, deformity or signs of injury. Right lower leg: No edema. Left lower leg: No edema. Skin:     General: Skin is warm and dry. Capillary Refill: Capillary refill takes less than 2 seconds. Coloration: Skin is not jaundiced or pale. Findings: No bruising, erythema, lesion or rash. Neurological:      General: No focal deficit present. Mental Status: She is alert and oriented to person, place, and time. Mental status is at baseline. GCS: GCS eye subscore is 4. GCS verbal subscore is 5. GCS motor subscore is 6. Cranial Nerves: No cranial nerve deficit, dysarthria or facial asymmetry. Sensory: Sensation is intact. No sensory deficit. Motor: Motor function is intact. No weakness, tremor, atrophy, abnormal muscle tone, seizure activity or pronator drift. Coordination: Coordination is intact. Romberg sign negative. Coordination normal. Finger-Nose-Finger Test and Heel to NIX [facility] Test normal. Rapid alternating movements normal.      Gait: Gait normal.      Deep Tendon Reflexes: Reflexes normal. Babinski sign absent on the right side. Babinski sign absent on the left side. Reflex Scores:       Tricep reflexes are 2+ on the right side and 2+ on the left side. Bicep reflexes are 2+ on the right side and 2+ on the left side. Brachioradialis reflexes are 2+ on the right side and 2+ on the left side. Patellar reflexes are 2+ on the right side and 2+ on the left side. Achilles reflexes are 2+ on the right side and 2+ on the left side. Psychiatric:         Mood and Affect: Mood normal.         Behavior: Behavior normal.         Thought Content: Thought content normal.         Judgment: Judgment normal.           On this date 4/27/2021 I have spent 45 minutes reviewing previous notes, test results and face to face with the patient discussing the diagnosis and importance of compliance with the treatment plan as well as documenting on the day of the visit. An electronic signature was used to authenticate this note.     --Sonny Duffy MD

## 2021-04-28 ENCOUNTER — TELEPHONE (OUTPATIENT)
Dept: NEUROSURGERY | Age: 36
End: 2021-04-28

## 2021-04-28 NOTE — TELEPHONE ENCOUNTER
Left vm with [facility] appt. It is scheduled 05/11/21 arrival time 12:30pm. Address is [address] [place] [address].   Faxed info to [phone] on 04/28/21

## 2021-04-30 LAB — OXCARBAZEPINE: 5 UG/ML (ref 3–35)

## 2021-05-18 ENCOUNTER — VIRTUAL VISIT (OUTPATIENT)
Dept: FAMILY MEDICINE CLINIC | Age: 36
End: 2021-05-18
Payer: OTHER GOVERNMENT

## 2021-05-18 DIAGNOSIS — E04.1 THYROID NODULE: ICD-10-CM

## 2021-05-18 DIAGNOSIS — R51.9 DAILY HEADACHE: Primary | ICD-10-CM

## 2021-05-18 DIAGNOSIS — L70.0 ACNE VULGARIS: ICD-10-CM

## 2021-05-18 DIAGNOSIS — F31.81 BIPOLAR II DISORDER (HCC): ICD-10-CM

## 2021-05-18 DIAGNOSIS — G93.5 CHIARI MALFORMATION TYPE I (HCC): ICD-10-CM

## 2021-05-18 PROCEDURE — G8427 DOCREV CUR MEDS BY ELIG CLIN: HCPCS | Performed by: FAMILY MEDICINE

## 2021-05-18 PROCEDURE — 99214 OFFICE O/P EST MOD 30 MIN: CPT | Performed by: FAMILY MEDICINE

## 2021-05-18 RX ORDER — LISDEXAMFETAMINE DIMESYLATE 50 MG
1 CAPSULE ORAL DAILY
COMMUNITY
Start: 2021-04-29 | End: 2021-11-09 | Stop reason: DRUGHIGH

## 2021-05-18 NOTE — PROGRESS NOTES
Subjective:  31 y.o. y/o female is here with complaints of ongoing daily headaches. Visit done via video telemedicine with Doxy. me due to current COVID-19 pandemic. Appt with [facility] neurologist who specializes in headaches end of the month  Did see Dr. Jennie Tan, Chiari malformation type I, reviewed 4/27/21 OV, recommending Chiari decompression if no improvement after working with neurologist  Taking motrin daily now  Always with headaches, no changes  Drinking plenty of water    Psych: Comprehensive, +bipolar, +ADHD, Vyvanse added to lamictal, doing OK   Concerned about acne, +chest and back, questions if related to vyvanse, +scarring, tried benzoyl peroxide and everything possible OTC, tried Cetaphil and Neutrogena    Ob/gyn: +AUB, Dr. Britney Narayan today, ibuprofen 800mg for cramping, labs planned, frequent and changed periods, denies any androgenic hair growth    Asking about repeat thyroid u/s, last U/S 1/18, +solitary solid nodule on right of 0.4cm    ROS otherwise unchanged    Patient's past medical, surgical, social and/or family history reviewed, updated in chart, and are non-contributory (unless otherwise stated). Medications and allergies also reviewed and updated in chart. There were no vitals filed for this visit. There is no height or weight on file to calculate BMI. General:  Patient alert and oriented x 3, NAD, pleasant  HEENT:  Atraumatic, normocephalic  Neck:  Supple  Lungs:  no resp distress  Skin: scattered discoloration on chest    Assessment/Plan:    Kamilla Campoverde was seen today for migraine.     Diagnoses and all orders for this visit:    Daily headache, uncontrolled  Chiari malformation type I ([facility] [address]  Keep appt with [facility] neurology for further evaluation/management  F/u with neurosurgery based on how she does  Continue mag and vit B2 for now  Ibuprofen prn  Hydration    Thyroid nodule, overdue for repeat imaging  -     US THYROID; Future    Bipolar II disorder ([facility] [address] improved  Continue with psych    Acne vulgaris, +scarring  -     Clindamycin Phos-Benzoyl Perox 1.2-3.75 % GEL; Apply topically to affected area once daily in the evening  + Failed OTC meds  + Failed benzoyl peroxide alone  + Trial of Benzaclin daily  + Did discuss consideration of aldactone when considering abnormal menses along with the acne--await further gyn eval      As above. Call or go to ED immediately if symptoms worsen or persist.  Return in about 3 months (around 8/18/2021). for VV to f/u above, or sooner if necessary. Spent over 30 minutes in direct and in-direct care of the patient on the day of her visit. Counseled regarding above diagnosis, including possible risks and complications,  especially if left uncontrolled. Counseled regarding the possible side effects, risks, benefits and alternatives to treatment; patient and/or guardian verbalizes understanding, agrees, feels comfortable with and wishes to proceed with above treatment plan. Advised patient to call with any new medication issues, and read all Rx info from pharmacy to assure aware of all possible risks and side effects of medication before taking. Patient and/or guardian verbalizes understanding and agrees with above counseling, assessment and plan. All questions answered.

## 2021-05-18 NOTE — PROGRESS NOTES
TeleMedicine Patient Consent    This visit was performed as a virtual video visit using a synchronous, two-way, audio-video telehealth technology platform. Patient identification was verified at the start of the visit, including the patient's telephone number and physical location. I discussed with the patient the nature of our telehealth visits, that:     1. Due to the nature of an audio- video modality, the only components of a physical exam that could be done are the elements supported by direct observation. 2. The provider will evaluate the patient and recommend diagnostics and treatments based on their assessment. 3. If it was felt that the patient should be evaluated in clinic or an emergency room setting, then they would be directed there. 4. Our sessions are not being recorded and that personal health information is protected. 5. Our team would provide follow up care in person if/when the patient needs it. Patient does agree to proceed with telemedicine consultation. Patient location: home address in [facility]    Physician location: regular office location    This visit was completed virtually using Doxy. me    This visit was performed during the 3118 public health crisis and COVID-19 pandemic.   *Add GT modifier to all Video Visits*

## 2021-05-20 ENCOUNTER — HOSPITAL ENCOUNTER (OUTPATIENT)
Age: 36
Discharge: HOME | End: 2021-05-20
Payer: OTHER GOVERNMENT

## 2021-05-20 LAB
FOLLICLE STIMULATING HORMONE: 5.9 MIU/ML
LUTEINIZING HORMONE: 8 MIU/ML

## 2021-05-20 PROCEDURE — 83001 ASSAY OF GONADOTROPIN (FSH): CPT

## 2021-05-20 PROCEDURE — 83002 ASSAY OF GONADOTROPIN (LH): CPT

## 2021-05-20 PROCEDURE — 36415 COLL VENOUS BLD VENIPUNCTURE: CPT

## 2021-05-26 DIAGNOSIS — L70.0 ACNE VULGARIS: ICD-10-CM

## 2021-05-29 ENCOUNTER — HOSPITAL ENCOUNTER (OUTPATIENT)
Dept: ULTRASOUND IMAGING | Age: 36
Discharge: HOME | End: 2021-05-31
Payer: OTHER GOVERNMENT

## 2021-05-29 DIAGNOSIS — E04.1 THYROID NODULE: ICD-10-CM

## 2021-05-29 PROCEDURE — 76536 US EXAM OF HEAD AND NECK: CPT

## 2021-06-01 ENCOUNTER — HOSPITAL ENCOUNTER (OUTPATIENT)
Dept: ULTRASOUND IMAGING | Age: 36
Discharge: HOME | End: 2021-06-03
Payer: OTHER GOVERNMENT

## 2021-06-01 DIAGNOSIS — S89.90XA INJURY OF CALF: ICD-10-CM

## 2021-06-01 PROCEDURE — 93971 EXTREMITY STUDY: CPT

## 2021-07-01 DIAGNOSIS — R51.9 DAILY HEADACHE: ICD-10-CM

## 2021-07-02 NOTE — TELEPHONE ENCOUNTER
Requested Prescriptions     Pending Prescriptions Disp Refills    Riboflavin 100 MG TABS 60 tablet 5     Sig: Take 200 mg by mouth daily

## 2021-08-23 DIAGNOSIS — J31.0 CHRONIC RHINITIS: ICD-10-CM

## 2021-08-24 RX ORDER — FLUTICASONE PROPIONATE 50 MCG
SPRAY, SUSPENSION (ML) NASAL
Qty: 1 BOTTLE | Refills: 3 | Status: SHIPPED
Start: 2021-08-24 | End: 2022-04-21

## 2021-09-01 PROBLEM — F43.10 PTSD (POST-TRAUMATIC STRESS DISORDER): Status: ACTIVE | Noted: 2021-09-01

## 2021-09-01 PROBLEM — F33.42 RECURRENT MAJOR DEPRESSIVE DISORDER, IN FULL REMISSION (HCC): Status: ACTIVE | Noted: 2021-09-01

## 2021-09-02 ENCOUNTER — HOSPITAL ENCOUNTER (OUTPATIENT)
Age: 36
Discharge: HOME | End: 2021-09-02
Payer: OTHER GOVERNMENT

## 2021-09-02 DIAGNOSIS — E04.1 THYROID NODULE: ICD-10-CM

## 2021-09-02 DIAGNOSIS — R53.83 FATIGUE, UNSPECIFIED TYPE: ICD-10-CM

## 2021-09-02 DIAGNOSIS — E55.9 VITAMIN D DEFICIENCY: ICD-10-CM

## 2021-09-02 DIAGNOSIS — R73.09 ELEVATED GLUCOSE: ICD-10-CM

## 2021-09-02 LAB
ALBUMIN SERPL-MCNC: 4.5 G/DL (ref 3.5–5.2)
ALP BLD-CCNC: 40 U/L (ref 35–104)
ALT SERPL-CCNC: 14 U/L (ref 0–32)
ANION GAP SERPL CALCULATED.3IONS-SCNC: 20 MMOL/L (ref 7–16)
AST SERPL-CCNC: 15 U/L (ref 0–31)
BASOPHILS ABSOLUTE: 0.03 E9/L (ref 0–0.2)
BASOPHILS RELATIVE PERCENT: 0.4 % (ref 0–2)
BILIRUB SERPL-MCNC: 0.3 MG/DL (ref 0–1.2)
BUN BLDV-MCNC: 9 MG/DL (ref 6–20)
CALCIUM SERPL-MCNC: 9.7 MG/DL (ref 8.6–10.2)
CHLORIDE BLD-SCNC: 106 MMOL/L (ref 98–107)
CO2: 17 MMOL/L (ref 22–29)
CREAT SERPL-MCNC: 0.7 MG/DL (ref 0.5–1)
EOSINOPHILS ABSOLUTE: 0.11 E9/L (ref 0.05–0.5)
EOSINOPHILS RELATIVE PERCENT: 1.4 % (ref 0–6)
GFR AFRICAN AMERICAN: >60
GFR NON-AFRICAN AMERICAN: >60 ML/MIN/1.73
GLUCOSE BLD-MCNC: 85 MG/DL (ref 74–99)
HBA1C MFR BLD: 5 % (ref 4–5.6)
HCT VFR BLD CALC: 37.7 % (ref 34–48)
HEMOGLOBIN: 13.1 G/DL (ref 11.5–15.5)
IMMATURE GRANULOCYTES #: 0.02 E9/L
IMMATURE GRANULOCYTES %: 0.3 % (ref 0–5)
LYMPHOCYTES ABSOLUTE: 2.15 E9/L (ref 1.5–4)
LYMPHOCYTES RELATIVE PERCENT: 27 % (ref 20–42)
MCH RBC QN AUTO: 30.5 PG (ref 26–35)
MCHC RBC AUTO-ENTMCNC: 34.7 % (ref 32–34.5)
MCV RBC AUTO: 87.9 FL (ref 80–99.9)
MONOCYTES ABSOLUTE: 0.38 E9/L (ref 0.1–0.95)
MONOCYTES RELATIVE PERCENT: 4.8 % (ref 2–12)
NEUTROPHILS ABSOLUTE: 5.28 E9/L (ref 1.8–7.3)
NEUTROPHILS RELATIVE PERCENT: 66.1 % (ref 43–80)
PDW BLD-RTO: 11.6 FL (ref 11.5–15)
PLATELET # BLD: 343 E9/L (ref 130–450)
PMV BLD AUTO: 10.5 FL (ref 7–12)
POTASSIUM SERPL-SCNC: 4.1 MMOL/L (ref 3.5–5)
RBC # BLD: 4.29 E12/L (ref 3.5–5.5)
SODIUM BLD-SCNC: 143 MMOL/L (ref 132–146)
T4 FREE: 1.26 NG/DL (ref 0.93–1.7)
TOTAL PROTEIN: 7.6 G/DL (ref 6.4–8.3)
TSH SERPL DL<=0.05 MIU/L-ACNC: 0.99 UIU/ML (ref 0.27–4.2)
VITAMIN D 25-HYDROXY: 39 NG/ML (ref 30–100)
WBC # BLD: 8 E9/L (ref 4.5–11.5)

## 2021-09-02 PROCEDURE — 84439 ASSAY OF FREE THYROXINE: CPT

## 2021-09-02 PROCEDURE — 36415 COLL VENOUS BLD VENIPUNCTURE: CPT

## 2021-09-02 PROCEDURE — 85025 COMPLETE CBC W/AUTO DIFF WBC: CPT

## 2021-09-02 PROCEDURE — 84443 ASSAY THYROID STIM HORMONE: CPT

## 2021-09-02 PROCEDURE — 83036 HEMOGLOBIN GLYCOSYLATED A1C: CPT

## 2021-09-02 PROCEDURE — 80053 COMPREHEN METABOLIC PANEL: CPT

## 2021-09-02 PROCEDURE — 82306 VITAMIN D 25 HYDROXY: CPT

## 2021-09-06 DIAGNOSIS — R51.9 DAILY HEADACHE: ICD-10-CM

## 2021-10-12 ENCOUNTER — VIRTUAL VISIT (OUTPATIENT)
Dept: FAMILY MEDICINE CLINIC | Age: 36
End: 2021-10-12
Payer: OTHER GOVERNMENT

## 2021-10-12 DIAGNOSIS — G47.9 SLEEP DISTURBANCE: ICD-10-CM

## 2021-10-12 DIAGNOSIS — R53.83 FATIGUE, UNSPECIFIED TYPE: Primary | ICD-10-CM

## 2021-10-12 DIAGNOSIS — R06.83 SNORING: ICD-10-CM

## 2021-10-12 PROCEDURE — G8427 DOCREV CUR MEDS BY ELIG CLIN: HCPCS | Performed by: FAMILY MEDICINE

## 2021-10-12 PROCEDURE — 99213 OFFICE O/P EST LOW 20 MIN: CPT | Performed by: FAMILY MEDICINE

## 2021-10-12 ASSESSMENT — ENCOUNTER SYMPTOMS
NAUSEA: 0
DIARRHEA: 0
WHEEZING: 0
VOMITING: 0
TROUBLE SWALLOWING: 0
SORE THROAT: 0
SHORTNESS OF BREATH: 0
COUGH: 0
CONSTIPATION: 0

## 2021-10-12 ASSESSMENT — SLEEP AND FATIGUE QUESTIONNAIRES
HOW LIKELY ARE YOU TO NOD OFF OR FALL ASLEEP WHILE LYING DOWN TO REST IN THE AFTERNOON WHEN CIRCUMSTANCES PERMIT: 3
HOW LIKELY ARE YOU TO NOD OFF OR FALL ASLEEP WHILE SITTING AND TALKING TO SOMEONE: 0
ESS TOTAL SCORE: 6
HOW LIKELY ARE YOU TO NOD OFF OR FALL ASLEEP WHEN YOU ARE A PASSENGER IN A CAR FOR AN HOUR WITHOUT A BREAK: 0
HOW LIKELY ARE YOU TO NOD OFF OR FALL ASLEEP WHILE SITTING QUIETLY AFTER LUNCH WITHOUT ALCOHOL: 0
HOW LIKELY ARE YOU TO NOD OFF OR FALL ASLEEP WHILE SITTING INACTIVE IN A PUBLIC PLACE: 0
HOW LIKELY ARE YOU TO NOD OFF OR FALL ASLEEP WHILE WATCHING TV: 1
HOW LIKELY ARE YOU TO NOD OFF OR FALL ASLEEP IN A CAR, WHILE STOPPED FOR A FEW MINUTES IN TRAFFIC: 0
HOW LIKELY ARE YOU TO NOD OFF OR FALL ASLEEP WHILE SITTING AND READING: 2

## 2021-10-12 NOTE — PROGRESS NOTES
[facility] Precepting [facility]    Subjective: This Telehealth visit was performed as two-way, audio-video technology platform. Verbal consent was taken from patient as noted in medical assistant's/nurse's note. Fatigue/waking in the night startled. Objective:  As noted in resident's note. Assessment/Plan:  Fatigue/Sleep disorder - will check sleep study     Attending Physician Statement  I have reviewed the chart, including any radiology or labs. I have discussed the case, including pertinent history with the resident. I agree with the assessment, plan and orders as documented by the resident. Please refer to the resident note for additional information.     Electronically signed by Leticia Llanes MD on 10/12/21 at 9:31 AM EDT

## 2021-10-12 NOTE — PROGRESS NOTES
10/12/2021    TELEHEALTH EVALUATION -- Audio/Visual (During NTJOU-27 public health emergency)    HPI:    Nieves Leary (:  1985) has requested an audio/video evaluation for the following concern(s):    Not sleeping through the night: She has been told she is a loud snorer. Wakes up through the night. Not rested in the morning. She startles awake at night. Has been losing weight, down 35 pounds. Sleep Medicine 10/12/2021   Sitting and reading 2   Watching TV 1   Sitting, inactive in a public place (e.g. a theatre or a meeting) 0   As a passenger in a car for an hour without a break 0   Lying down to rest in the afternoon when circumstances permit 3   Sitting and talking to someone 0   Sitting quietly after a lunch without alcohol 0   In a car, while stopped for a few minutes in traffic 0   Total score 6           Review of Systems   Constitutional: Negative for chills and fever. HENT: Negative for sore throat and trouble swallowing. Respiratory: Negative for cough, shortness of breath and wheezing. Cardiovascular: Negative for chest pain, palpitations and leg swelling. Gastrointestinal: Negative for constipation, diarrhea, nausea and vomiting. Genitourinary: Negative for dysuria and hematuria. Neurological: Negative for light-headedness and headaches. Psychiatric/Behavioral: Positive for sleep disturbance. Prior to Visit Medications    Medication Sig Taking? Authorizing Provider   Magnesium Oxide -Mg Supplement 400 MG CAPS TAKE 1 CAPSULE BY MOUTH EVERY DAY Yes Jorje Romberg, MD   fluticasone (FLONASE) 50 MCG/ACT nasal spray PLACE 1 SPRAY INTO EACH NOSTRIL EVERY DAY Yes Jorje Romberg, MD   Riboflavin 100 MG TABS Take 200 mg by mouth daily Yes Jorje Romberg, MD   OXcarbazepine (TRILEPTAL) 300 MG tablet TAKE 1 TABLET BY MOUTH TWICE A DAY Yes Jorje Romberg, MD   VYVANSE 50 MG capsule Take 1 capsule by mouth daily.  Yes Historical Provider, MD   ibuprofen (ADVIL;MOTRIN) 800 MG tablet Take 800 mg by mouth every 6 hours as needed for Pain Yes Historical Provider, MD   naproxen (NAPROSYN) 250 MG tablet Take 2 tablets by mouth every 12 hours Yes Eligio,    cetirizine (ZYRTEC) 10 MG tablet Take 10 mg by mouth daily Yes Historical Provider, MD   Clindamycin Phos-Benzoyl Perox 1.2-3.75 % GEL Apply topically to affected area once daily in the evening  Patient not taking: Reported on 10/12/2021  Karen Herbert MD       Social History     Tobacco Use    Smoking status: Never Smoker    Smokeless tobacco: Never Used   Substance Use Topics    Alcohol use: Yes     Alcohol/week: 0.0 standard drinks     Comment: socially    Drug use: No          PHYSICAL EXAMINATION:  [ INSTRUCTIONS:  \"[x]\" Indicates a positive item  \"[]\" Indicates a negative item  -- DELETE ALL ITEMS NOT EXAMINED]  Vital Signs: (As obtained by patient/caregiver or practitioner observation)        Constitutional: [x] Appears well-developed and well-nourished [x] No apparent distress      [] Abnormal-   Mental status  [x] Alert and awake  [x] Oriented to person/place/time [x]Able to follow commands      Eyes:  EOM    [x]  Normal  [] Abnormal-  Sclera  [x]  Normal  [] Abnormal -         Discharge [x]  None visible  [] Abnormal -    HENT:   [x] Normocephalic, atraumatic.   [] Abnormal   [x] Mouth/Throat: Mucous membranes are moist.     External Ears [x] Normal  [] Abnormal-     Neck: [x] No visualized mass     Pulmonary/Chest: [x] Respiratory effort normal.  [x] No visualized signs of difficulty breathing or respiratory distress        [] Abnormal-      Musculoskeletal:   [x] Normal gait with no signs of ataxia         [x] Normal range of motion of neck        [] Abnormal-       Neurological:        [x] No Facial Asymmetry (Cranial nerve 7 motor function) (limited exam to video visit)          [x] No gaze palsy        [] Abnormal-         Skin:        [x] No significant exanthematous lesions or discoloration noted on facial skin         [] Abnormal-            Psychiatric:       [x] Normal Affect [x] No Hallucinations        [] Abnormal-     Other pertinent observable physical exam findings-     ASSESSMENT/PLAN:    1. Fatigue, unspecified type  Refer for Sleep Study  Patient has had lab work ruling out other causes  Low Corrigan but symptoms consistent and has been recommended to have a sleep study by her PCP and Psych   Pt will need to call to report her neck circumference given VV limitations  - Baseline Diagnostic Sleep Study; Future    2. Sleep disturbance  As above  Refer for Sleep Study  - Baseline Diagnostic Sleep Study; Future    3. Snoring  As above  Refer for Sleep Study  - Baseline Diagnostic Sleep Study; Future       Return if symptoms worsen or fail to improve. Jozef Browning, was evaluated through a synchronous (real-time) audio-video encounter. The patient (or guardian if applicable) is aware that this is a billable service. Verbal consent to proceed has been obtained within the past 12 months. The visit was conducted pursuant to the emergency declaration under the [address] [place] [address] authority and the [employer] General Act. Patient identification was verified, and a caregiver was present when appropriate. The patient was located in a state where the provider was credentialed to provide care. Total time spent on this encounter: Not billed by time    --Shirley Rodriguez MD on 10/12/2021 at 9:32 AM    An electronic signature was used to authenticate this note.

## 2021-10-15 ENCOUNTER — PATIENT MESSAGE (OUTPATIENT)
Dept: FAMILY MEDICINE CLINIC | Age: 36
End: 2021-10-15

## 2021-10-15 NOTE — TELEPHONE ENCOUNTER
From: Nieves Leary  To: Jorje Romberg, MD  Sent: 10/15/2021 9:33 AM EDT  Subject: Non-Urgent Medical Question    Good Morning, its that time of year again where I have to submit the form that I do not receive the flu vaccine. would it be possible to write one for me again? I can pick it up, receive it on my MyChart, or it can be faxed to [phone] att: Jaxson Arthur. As always I really appreciate it.      Vijaya Freed

## 2021-10-15 NOTE — LETTER
[address] [address]  Phone: [phone]  Fax: [phone]    Nela Das MD        October 19, 2021     Patient: Konrad Hatfield   YOB: 1985   Date of Visit: 10/15/2021       To Whom It May Concern: It is my medical opinion that Luis Manuel Johnson should be medically exempt from receiving the annual influenza vaccine. If you have any questions or concerns, please don't hesitate to call.     Sincerely,        Nela Das MD

## 2021-11-04 DIAGNOSIS — R51.9 DAILY HEADACHE: ICD-10-CM

## 2021-11-09 ENCOUNTER — VIRTUAL VISIT (OUTPATIENT)
Dept: FAMILY MEDICINE CLINIC | Age: 36
End: 2021-11-09
Payer: OTHER GOVERNMENT

## 2021-11-09 DIAGNOSIS — F43.10 PTSD (POST-TRAUMATIC STRESS DISORDER): ICD-10-CM

## 2021-11-09 DIAGNOSIS — G93.5 CHIARI MALFORMATION TYPE I (HCC): ICD-10-CM

## 2021-11-09 DIAGNOSIS — R51.9 DAILY HEADACHE: Primary | ICD-10-CM

## 2021-11-09 DIAGNOSIS — F90.0 ATTENTION DEFICIT HYPERACTIVITY DISORDER (ADHD), PREDOMINANTLY INATTENTIVE TYPE: ICD-10-CM

## 2021-11-09 DIAGNOSIS — F33.42 RECURRENT MAJOR DEPRESSIVE DISORDER, IN FULL REMISSION (HCC): ICD-10-CM

## 2021-11-09 DIAGNOSIS — Z86.19 H/O HERPES ZOSTER: ICD-10-CM

## 2021-11-09 DIAGNOSIS — G47.9 SLEEP DISTURBANCE: ICD-10-CM

## 2021-11-09 PROCEDURE — G8427 DOCREV CUR MEDS BY ELIG CLIN: HCPCS | Performed by: FAMILY MEDICINE

## 2021-11-09 PROCEDURE — 99214 OFFICE O/P EST MOD 30 MIN: CPT | Performed by: FAMILY MEDICINE

## 2021-11-09 RX ORDER — LISDEXAMFETAMINE DIMESYLATE 60 MG/1
1 CAPSULE ORAL DAILY
COMMUNITY
Start: 2021-10-13 | End: 2022-08-15

## 2021-11-09 RX ORDER — OXYBUTYNIN CHLORIDE 5 MG/1
1 TABLET, EXTENDED RELEASE ORAL DAILY
COMMUNITY
Start: 2021-10-20 | End: 2022-01-06

## 2021-11-09 NOTE — PROGRESS NOTES
Subjective:  31 y.o. y/o female is here for f/u issues including depression. Visit done via video telemedicine with Doxy. me due to current COVID-19 pandemic. Had to cancel with [facility] neurologist who specializes in headaches; been unable to get a hold of the office and reschedule  Did see Dr. Lynnette Kate, Chiari malformation type I, 4/27/21 OV, recommending Chiari decompression if no improvement after working with neurologist  Taking motrin  Always with headaches, no changes  Drinking plenty of water    Psych: Comprehensive, +depression and PTSD (NOT bipolar), +ADHD, +Vyvanse and now trileptal, doing OK, always fatigued   Acne resolved with start of metformin  Sleep study in February    Ob/gyn: +PCOS, Dr. Mulugeta Choudhary, ibuprofen 800mg for cramping, +metformin 500mg BID (tolerating OK but loose stools)  Oxybutynin 5mg, 3 weeks, urgency decreased,++dry mouth  Tried OCP, didn't tolerate well, affected mood    Repeat thyroid U/S with no nodules 5/21    Shingles often, asking about Shingrix    ROS otherwise unchanged    Patient's past medical, surgical, social and/or family history reviewed, updated in chart, and are non-contributory (unless otherwise stated). Medications and allergies also reviewed and updated in chart. There were no vitals filed for this visit. There is no height or weight on file to calculate BMI. General:  Patient alert and oriented x 3, NAD, pleasant  HEENT:  Atraumatic, normocephalic  Neck:  Supple  Lungs:  no resp distress      Assessment/Plan:    Dolores Aviles was seen today for depression.     Diagnoses and all orders for this visit:    Daily headache  Chiari malformation type I [facility])  Staff to help arrange appt with [facility] neurology for further evaluation as recommended by Dr. Brett Zaragoza surgery the next option    PTSD (post-traumatic stress disorder)  Recurrent major depressive disorder, in full remission ([facility] [address]  Attention deficit hyperactivity disorder (ADHD), predominantly inattentive type  Continue with psych as directed  Continue same meds    Sleep disturbance  Needs to complete the sleep study for further data before recommending any further med changes etc  Good sleep hygiene    H/O herpes zoster  Per uptodate, very rare to have shingles 3 or more times--will discuss with her next time, will need to confirm diagnosis and potentially send to ID for further recommendations      As above. Call or go to ED immediately if symptoms worsen or persist.  Return in about 4 months (around 3/9/2022). for VV to f/u above, or sooner if necessary. Spent over 30 minutes in direct and in-direct care of the patient on the day of her visit. Counseled regarding above diagnosis, including possible risks and complications,  especially if left uncontrolled. Counseled regarding the possible side effects, risks, benefits and alternatives to treatment; patient and/or guardian verbalizes understanding, agrees, feels comfortable with and wishes to proceed with above treatment plan. Advised patient to call with any new medication issues, and read all Rx info from pharmacy to assure aware of all possible risks and side effects of medication before taking. Patient and/or guardian verbalizes understanding and agrees with above counseling, assessment and plan. All questions answered.

## 2021-11-09 NOTE — Clinical Note
Please help arrange appointment with [facility] neurology for headaches. Referral made by Dr. Juan Vazquez. Patient had to cancel initially and has been unable to reschedule. And make f/u appt for patient. Thanks.

## 2021-11-11 PROBLEM — J45.20 MILD INTERMITTENT ASTHMA WITHOUT COMPLICATION: Status: RESOLVED | Noted: 2017-01-25 | Resolved: 2021-11-11

## 2022-01-03 RX ORDER — OXCARBAZEPINE 300 MG/1
300 TABLET, FILM COATED ORAL NIGHTLY
Qty: 90 TABLET | Refills: 1 | Status: SHIPPED
Start: 2022-01-03 | End: 2022-01-06

## 2022-01-06 ENCOUNTER — OFFICE VISIT (OUTPATIENT)
Dept: FAMILY MEDICINE CLINIC | Age: 37
End: 2022-01-06
Payer: OTHER GOVERNMENT

## 2022-01-06 VITALS
RESPIRATION RATE: 16 BRPM | SYSTOLIC BLOOD PRESSURE: 111 MMHG | WEIGHT: 211 LBS | TEMPERATURE: 97.9 F | HEIGHT: 69 IN | HEART RATE: 104 BPM | BODY MASS INDEX: 31.25 KG/M2 | DIASTOLIC BLOOD PRESSURE: 75 MMHG | OXYGEN SATURATION: 98 %

## 2022-01-06 DIAGNOSIS — N30.01 ACUTE CYSTITIS WITH HEMATURIA: Primary | ICD-10-CM

## 2022-01-06 DIAGNOSIS — N30.00 ACUTE CYSTITIS WITHOUT HEMATURIA: ICD-10-CM

## 2022-01-06 DIAGNOSIS — K59.00 CONSTIPATION, UNSPECIFIED CONSTIPATION TYPE: ICD-10-CM

## 2022-01-06 DIAGNOSIS — R51.9 DAILY HEADACHE: ICD-10-CM

## 2022-01-06 LAB
BILIRUBIN, POC: ABNORMAL
BLOOD URINE, POC: ABNORMAL
CLARITY, POC: ABNORMAL
COLOR, POC: ABNORMAL
GLUCOSE URINE, POC: ABNORMAL
KETONES, POC: ABNORMAL
LEUKOCYTE EST, POC: ABNORMAL
NITRITE, POC: ABNORMAL
PH, POC: 7
PROTEIN, POC: 30
SPECIFIC GRAVITY, POC: 1.02
UROBILINOGEN, POC: 0.2

## 2022-01-06 PROCEDURE — G8427 DOCREV CUR MEDS BY ELIG CLIN: HCPCS | Performed by: FAMILY MEDICINE

## 2022-01-06 PROCEDURE — 99213 OFFICE O/P EST LOW 20 MIN: CPT | Performed by: FAMILY MEDICINE

## 2022-01-06 PROCEDURE — G8417 CALC BMI ABV UP PARAM F/U: HCPCS | Performed by: FAMILY MEDICINE

## 2022-01-06 PROCEDURE — 1036F TOBACCO NON-USER: CPT | Performed by: FAMILY MEDICINE

## 2022-01-06 PROCEDURE — G8484 FLU IMMUNIZE NO ADMIN: HCPCS | Performed by: FAMILY MEDICINE

## 2022-01-06 PROCEDURE — 81002 URINALYSIS NONAUTO W/O SCOPE: CPT | Performed by: FAMILY MEDICINE

## 2022-01-06 RX ORDER — NITROFURANTOIN 25; 75 MG/1; MG/1
100 CAPSULE ORAL 2 TIMES DAILY
Qty: 14 CAPSULE | Refills: 0 | Status: SHIPPED | OUTPATIENT
Start: 2022-01-06 | End: 2022-01-13

## 2022-01-06 RX ORDER — ZONISAMIDE 50 MG/1
1 CAPSULE ORAL DAILY
COMMUNITY
Start: 2021-12-01 | End: 2022-02-14 | Stop reason: ALTCHOICE

## 2022-01-06 RX ORDER — POLYETHYLENE GLYCOL 3350 17 G/17G
17 POWDER, FOR SOLUTION ORAL DAILY
Qty: 1530 G | Refills: 1 | Status: SHIPPED | OUTPATIENT
Start: 2022-01-06 | End: 2022-02-05

## 2022-01-06 RX ORDER — HYDROXYZINE HYDROCHLORIDE 10 MG/1
10 TABLET, FILM COATED ORAL 3 TIMES DAILY PRN
COMMUNITY
Start: 2021-12-16 | End: 2022-06-27 | Stop reason: ALTCHOICE

## 2022-01-06 ASSESSMENT — PATIENT HEALTH QUESTIONNAIRE - PHQ9
2. FEELING DOWN, DEPRESSED OR HOPELESS: 0
SUM OF ALL RESPONSES TO PHQ QUESTIONS 1-9: 0
SUM OF ALL RESPONSES TO PHQ QUESTIONS 1-9: 0
1. LITTLE INTEREST OR PLEASURE IN DOING THINGS: 0
SUM OF ALL RESPONSES TO PHQ QUESTIONS 1-9: 0
SUM OF ALL RESPONSES TO PHQ QUESTIONS 1-9: 0
1. LITTLE INTEREST OR PLEASURE IN DOING THINGS: 0
SUM OF ALL RESPONSES TO PHQ9 QUESTIONS 1 & 2: 0
SUM OF ALL RESPONSES TO PHQ9 QUESTIONS 1 & 2: 0
SUM OF ALL RESPONSES TO PHQ QUESTIONS 1-9: 0
2. FEELING DOWN, DEPRESSED OR HOPELESS: 0

## 2022-01-06 NOTE — PROGRESS NOTES
[facility]  Family Medicine Residency  Phone: [phone]  Fax: [phone]    Patient:  Renée Lauren 31 y.o. female                                 Date of Service: 1/6/22                            Chiefcomplaint:   Chief Complaint   Patient presents with    Urinary Frequency     Times 4 days    Dysuria    Pelvic Pain     R side, round area painful         History of Present Illness: The patient is a 31 y.o. female  presented to the clinic with complaints as above. Stated that she has symptoms of frequency and burning and dysuria for 2 days now . Patient stated she has not noticed any abnormal bleeding or discharge or odor no order, sexually active at this time    She also reports right abdominal pain which has been ongoing for 1 month now. Patient feels that it intermittent, manageable pain, most of the time dull but sometimes sharp as well, she had pain last night as well. She describes it as going straight to her back. She denies any history of kidney stones. She reports like she feels a lump in her belly. Occasionally the pain would radiated to her right groin area. She is on Vyvanse which was recently started in December after Trileptal was discontinued  She is on Metformin for her PCOS and follows with Dr. Dane Villar and is  up-to-date with her Pap    She stated she is constipated for a week now. Review of Systems:   Review of Systems   Constitutional: Negative for chills and fever. HENT: Negative for congestion, sore throat and trouble swallowing. Respiratory: Negative for cough. Cardiovascular: Negative for chest pain and leg swelling. Gastrointestinal: Negative for abdominal pain, constipation, diarrhea, nausea and vomiting. Genitourinary: Positive for dysuria, frequency and urgency. Negative for difficulty urinating. Musculoskeletal: Negative for arthralgias and myalgias. Skin: Negative for rash and wound.    Neurological: Negative for dizziness and headaches. Psychiatric/Behavioral: Negative for agitation. Past Medical History:      Diagnosis Date    Arthritis     Asthma     not taking any medications    Lesion of mouth        Past Surgical History:        Procedure Laterality Date    CHOLECYSTECTOMY, LAPAROSCOPIC  2010    COLONOSCOPY      LAPAROSCOPY      Ex-lap, abdominal, removed implanted IUD    [facility] BREAST BIOPSY RIGHT Right 4/5/2021    [facility] LOC BREAST BIOPSY RIGHT 4/5/2021 [facility]    OTHER SURGICAL HISTORY Right 5/2/2016    tonsil mass incision    TONSILLECTOMY  2004    UPPER GASTROINTESTINAL ENDOSCOPY      WISDOM TOOTH EXTRACTION         Allergies:    Bupropion and Seasonal    Social History:   Social History     Socioeconomic History    Marital status:      Spouse name: Not on file    Number of children: 2    Years of education: 12    Highest education level: Associate degree: academic program   Occupational History    Not on file   Tobacco Use    Smoking status: Never Smoker    Smokeless tobacco: Never Used   Substance and Sexual Activity    Alcohol use: Yes     Alcohol/week: 0.0 standard drinks     Comment: socially    Drug use: No    Sexual activity: Not Currently     Partners: Male     Birth control/protection: Pill   Other Topics Concern    Not on file   Social History Narrative    Not on file     Social Determinants of Health     Financial Resource Strain:     Difficulty of Paying Living Expenses: Not on file   Food Insecurity:     Worried About Running Out of Food in the Last Year: Not on file    Sam of Food in the Last Year: Not on file   Transportation Needs:     Lack of Transportation (Medical): Not on file    Lack of Transportation (Non-Medical):  Not on file   Physical Activity:     Days of Exercise per Week: Not on file    Minutes of Exercise per Session: Not on file   Stress:     Feeling of Stress : Not on file   Social Connections:     Frequency of Communication with Friends and Family: Not on file    Frequency of Social Gatherings with Friends and Family: Not on file    Attends Protestant Services: Not on file    Active Member of Clubs or Organizations: Not on file    Attends Club or Organization Meetings: Not on file    Marital Status: Not on file   Intimate Partner Violence:     Fear of Current or Ex-Partner: Not on file    Emotionally Abused: Not on file    Physically Abused: Not on file    Sexually Abused: Not on file   Housing Stability:     Unable to Pay for Housing in the Last Year: Not on file    Number of [place] in the Last Year: Not on file    Unstable Housing in the Last Year: Not on file        Family History:       Problem Relation Age of Onset    High Blood Pressure Mother     Breast Cancer Mother 35    Thyroid Cancer Mother 44    No Known Problems Father         Heart issues run in family    Cancer Maternal Aunt         skin    Pancreatic Cancer Maternal Grandfather 48    Breast Cancer Maternal Cousin 39    Breast Cancer Maternal Cousin 28        second cousin    No Known Problems Sister     No Known Problems Brother     No Known Problems Maternal Grandmother        Physical Exam:    Vitals: /75 (Site: Left Upper Arm, Position: Sitting, Cuff Size: Medium Adult)   Pulse 104   Temp 97.9 °F (36.6 °C) (Temporal)   Resp 16   Ht 5' 9\" (1.753 m)   Wt 211 lb (95.7 kg)   LMP 12/17/2021 (Approximate)   SpO2 98%   BMI 31.16 kg/m²   BP Readings from Last 3 Encounters:   01/06/22 111/75   04/27/21 118/77   03/10/21 110/76     General Appearance: Well developed, awake, alert, oriented, no acute distress  HEENT: Normocephalic,atraumatic. PERRL, EOM's intact, EAC without erythema or swelling, no pallor or icterus. Neck: Supple, symmetrical, trachea midline. No JVD. Chest wall/Lung: Clear to auscultation  bilaterally,  respirations unlabored.  No ronchi/wheezing/rales  Heart[de-identified]  Regular rate and rhythm, S1and S2 normal, no murmur, rub or gallop. Abdomen: Soft, non-tender, bowel sounds normoactive, no masses, no organomegaly  Extremities:  Extremities normal, atraumatic, no cyanosis. edema. Skin: Skin color, texture, turgor normal, no rashes or lesions  Musculokeletal: ROM grossly normal in all joints of extremities, no obvious joint swelling. Lymph nodes: no lymph node enlargement appreciated  Neurologic:   Alert&Oriented. Normal gait and coordination  No focal neurological deficits appreaciated         Psychiatric: has a normal mood and affect. Behavior is normal.       Assessment and Plan:      Acute cystitis   Increased urinary frequency  Will give Macrobid for 7 days, prescription sent to the pharmacy  Patient was encouraged plenty of fluids    Abdominal pain  States she is constipated for a week now, will try MiraLAX for relief  We will follow up in the clinic in few weeks if abdominal pain is not better for further imaging/testing.    - POCT Urinalysis no Micro  - Culture, Urine; Future      Return to Office: Return in about 4 weeks (around 2/3/2022), or if symptoms worsen or fail to improve, for fu for abdominal pain . I encourage further reading and education about your health conditions. Information on many healthconditions is provided by the American Academy of Family Physicians: https://familydoctor. org/  Please bring any questions to me at your next visit. This document may have been prepared at least partiallythrough the use of voice recognition software. Although effort is taken to assure the accuracy of this document, it is possible that grammatical, syntax,  or spelling errors may occur.     Medication List:    Current Outpatient Medications   Medication Sig Dispense Refill    hydrOXYzine (ATARAX) 10 MG tablet Take 10 mg by mouth 3 times daily as needed       zonisamide (ZONEGRAN) 50 MG capsule Take 1 capsule by mouth daily      nitrofurantoin, macrocrystal-monohydrate, (MACROBID) 100 MG capsule Take 1 capsule by mouth 2 times daily for 7 days 14 capsule 0    polyethylene glycol (GLYCOLAX) 17 GM/SCOOP powder Take 17 g by mouth daily 1530 g 1    VYVANSE 60 MG CAPS Take 1 capsule by mouth daily.  metFORMIN (GLUCOPHAGE) 500 MG tablet Take 1 tablet by mouth 3 times daily      fluticasone (FLONASE) 50 MCG/ACT nasal spray PLACE 1 SPRAY INTO EACH NOSTRIL EVERY DAY 1 Bottle 3    ibuprofen (ADVIL;MOTRIN) 800 MG tablet Take 800 mg by mouth every 6 hours as needed for Pain      naproxen (NAPROSYN) 250 MG tablet Take 2 tablets by mouth every 12 hours 60 tablet 0    cetirizine (ZYRTEC) 10 MG tablet Take 10 mg by mouth daily      Magnesium Oxide -Mg Supplement 400 MG CAPS TAKE 1 CAPSULE BY MOUTH EVERY DAY 30 capsule 5    vitamin B-2 (RIBOFLAVIN) 100 MG TABS tablet TAKE 2 TABLETS BY MOUTH EVERY DAY 60 tablet 5     No current facility-administered medications for this visit.         Chelle Gonzalez MD

## 2022-01-06 NOTE — PROGRESS NOTES
[address]  Precepting Note    Subjective:  Right sided abdominal pain  No radiation    Increased urinary frequency and burning    ROS otherwise negative     Past medical, surgical, family and social history were reviewed, non-contributory, and unchanged unless otherwise stated. Objective:    /75 (Site: Left Upper Arm, Position: Sitting, Cuff Size: Medium Adult)   Pulse 104   Temp 97.9 °F (36.6 °C) (Temporal)   Resp 16   Ht 5' 9\" (1.753 m)   Wt 211 lb (95.7 kg)   LMP 12/17/2021 (Approximate)   SpO2 98%   BMI 31.16 kg/m²     Exam is as noted by resident     Assessment/Plan:  Right sided abdominal pain  -  Has been constipated for 1 week- trial of Miralax, increase fluid  Dysuria: blood and leukocyte in urine- treat with macrobid  Sent for urine culture  F/u as instructed     Attending Physician Statement  I have reviewed the chart, including any radiology or labs. I have discussed the case, including pertinent history and exam findings with the resident. I agree with the assessment, plan and orders as documented by the resident. Please refer to the resident note for additional information.       Electronically signed by Anthony Duncan MD on 1/6/2022 at 8:59 AM

## 2022-01-07 PROBLEM — N30.01 ACUTE CYSTITIS WITH HEMATURIA: Status: ACTIVE | Noted: 2022-01-07

## 2022-01-07 PROBLEM — K59.00 CONSTIPATION: Status: ACTIVE | Noted: 2022-01-07

## 2022-01-07 ASSESSMENT — ENCOUNTER SYMPTOMS
COUGH: 0
SORE THROAT: 0
NAUSEA: 0
ABDOMINAL PAIN: 0
VOMITING: 0
CONSTIPATION: 0
DIARRHEA: 0
TROUBLE SWALLOWING: 0

## 2022-01-09 LAB
ORGANISM: ABNORMAL
URINE CULTURE, ROUTINE: ABNORMAL

## 2022-01-28 DIAGNOSIS — R51.9 DAILY HEADACHE: ICD-10-CM

## 2022-01-28 RX ORDER — MAGNESIUM OXIDE 400 MG/1
TABLET ORAL
Qty: 30 TABLET | Refills: 5 | Status: SHIPPED
Start: 2022-01-28 | End: 2022-08-15 | Stop reason: SDUPTHER

## 2022-02-09 ENCOUNTER — TELEPHONE (OUTPATIENT)
Dept: SLEEP CENTER | Age: 37
End: 2022-02-09

## 2022-02-09 DIAGNOSIS — G47.9 SLEEP DISTURBANCE: ICD-10-CM

## 2022-02-09 DIAGNOSIS — R53.83 FATIGUE, UNSPECIFIED TYPE: Primary | ICD-10-CM

## 2022-02-09 DIAGNOSIS — R06.83 SNORING: ICD-10-CM

## 2022-02-11 NOTE — TELEPHONE ENCOUNTER
Sleep study cancelled for 2/11/22 [employer] will not pay for lab sleep study).  Order needed for HST

## 2022-02-14 ENCOUNTER — OFFICE VISIT (OUTPATIENT)
Dept: FAMILY MEDICINE CLINIC | Age: 37
End: 2022-02-14
Payer: OTHER GOVERNMENT

## 2022-02-14 VITALS
DIASTOLIC BLOOD PRESSURE: 78 MMHG | HEIGHT: 69 IN | HEART RATE: 89 BPM | BODY MASS INDEX: 30.96 KG/M2 | OXYGEN SATURATION: 98 % | RESPIRATION RATE: 16 BRPM | WEIGHT: 209 LBS | SYSTOLIC BLOOD PRESSURE: 112 MMHG | TEMPERATURE: 97.8 F

## 2022-02-14 DIAGNOSIS — U07.1 COVID-19: ICD-10-CM

## 2022-02-14 DIAGNOSIS — R00.2 PALPITATIONS: Primary | ICD-10-CM

## 2022-02-14 PROCEDURE — G8417 CALC BMI ABV UP PARAM F/U: HCPCS | Performed by: FAMILY MEDICINE

## 2022-02-14 PROCEDURE — 93000 ELECTROCARDIOGRAM COMPLETE: CPT | Performed by: FAMILY MEDICINE

## 2022-02-14 PROCEDURE — G8427 DOCREV CUR MEDS BY ELIG CLIN: HCPCS | Performed by: FAMILY MEDICINE

## 2022-02-14 PROCEDURE — 99214 OFFICE O/P EST MOD 30 MIN: CPT | Performed by: FAMILY MEDICINE

## 2022-02-14 PROCEDURE — 1036F TOBACCO NON-USER: CPT | Performed by: FAMILY MEDICINE

## 2022-02-14 PROCEDURE — G8484 FLU IMMUNIZE NO ADMIN: HCPCS | Performed by: FAMILY MEDICINE

## 2022-02-14 RX ORDER — CEFDINIR 300 MG/1
CAPSULE ORAL
COMMUNITY
Start: 2022-02-05 | End: 2022-02-10

## 2022-02-14 RX ORDER — FLUCONAZOLE 150 MG/1
TABLET ORAL
COMMUNITY
Start: 2022-02-05 | End: 2022-08-15 | Stop reason: ALTCHOICE

## 2022-02-14 NOTE — PROGRESS NOTES
Subjective:  31 y.o. y/o female is here for palpitations following COVID infection. Diagnosed with COVID pneumonia, [facility] 2/5/21, couple days of cefdinir left, unable to tolerate the steroids, finished zpack  Intermittent heart racing, especially with any activity and changing positions, maybe improving  Lightheaded and SOA, unable to walk and talk at same time, slowing improving  Tested positive 1/19, symptoms 1/17  +h/o hole in heart? Echo maybe 10 years ago    Headaches mild, not yet tried to make appt at [facility] as recommended by Dr. Collin Nguyen    Sleep study switched to home study d/t insurance, should be soon    ROS otherwise unchanged    Patient's past medical, surgical, social and/or family history reviewed, updated in chart, and are non-contributory (unless otherwise stated). Medications and allergies also reviewed and updated in chart. Vitals:    02/14/22 0928 02/14/22 1010 02/14/22 1011 02/14/22 1012   BP: 118/72 109/72 109/80 112/78   Site: Left Upper Arm Right Upper Arm Right Upper Arm Right Upper Arm   Position: Sitting Supine Sitting Sitting   Cuff Size: Medium Adult Medium Adult Medium Adult Medium Adult   Pulse: 91 91 87 89   Resp: 16      Temp: 97.8 °F (36.6 °C)      TempSrc: Temporal      SpO2: 99% 99% 100% 98%   Weight: 209 lb (94.8 kg)      Height: 5' 9\" (1.753 m)        Body mass index is 30.86 kg/m². General:  Patient alert and oriented x 3, NAD, pleasant  HEENT:  Atraumatic, normocephalic  Neck:  Supple, no LAD, no bruits  Heart: RRR, no m/r/g  Lungs:  CTAB, no resp distress  Ext: No c/c/e    EKG: (Reviewed)--Unremarkable, NSR, normal intervals  Orthostatics done: Negative    Assessment/Plan:    Liane Patten was seen today for abdominal pain and post-covid symptoms.     Diagnoses and all orders for this visit:    Palpitations  -     EKG 12 lead--normal  + Orthostatics normal  + Symptoms maybe slightly improving  + Most likely related to COVID and should improve  + Will continue to monitor for now  + Will call if persists or worsens--will then plan for Holter monitor and/or cardiology referral    COVID-19  Finish abx as prescribed  Supportive therapy      As above. Call or go to ED immediately if symptoms worsen or persist.  Return in about 6 months (around 8/14/2022). , or sooner if necessary. Spent over 30 minutes in direct and in-direct care of the patient on the day of her visit. Counseled regarding above diagnosis, including possible risks and complications,  especially if left uncontrolled. Counseled regarding the possible side effects, risks, benefits and alternatives to treatment; patient and/or guardian verbalizes understanding, agrees, feels comfortable with and wishes to proceed with above treatment plan. Advised patient to call with any new medication issues, and read all Rx info from pharmacy to assure aware of all possible risks and side effects of medication before taking. Patient and/or guardian verbalizes understanding and agrees with above counseling, assessment and plan. All questions answered.

## 2022-03-01 ENCOUNTER — HOSPITAL ENCOUNTER (OUTPATIENT)
Dept: SLEEP CENTER | Age: 37
Discharge: HOME | End: 2022-03-01
Payer: OTHER GOVERNMENT

## 2022-03-01 DIAGNOSIS — R06.83 SNORING: ICD-10-CM

## 2022-03-01 DIAGNOSIS — G47.9 SLEEP DISTURBANCE: ICD-10-CM

## 2022-03-01 DIAGNOSIS — R53.83 FATIGUE, UNSPECIFIED TYPE: ICD-10-CM

## 2022-03-01 PROCEDURE — G0399 HOME SLEEP TEST/TYPE 3 PORTA: HCPCS

## 2022-03-05 NOTE — PROGRESS NOTES
[address] [facility]                  [address]                               SLEEP STUDY REPORT    PATIENT NAME: Brittney Segal                     :        1985  MED REC NO:   [id number]                            ROOM:  ACCOUNT NO:   [de-identified]                           ADMIT DATE: 2022  PROVIDER:     Ashley Yen MD    DATE OF STUDY:  2022    REFERRING PROVIDER:  Salome Bucio M.D.    STUDY PERFORMED:  Sleep study. INDICATION FOR STUDY:  Witnessed apnea, loud snoring, restless sleep,  morning headache, trouble with memory/concentration, and nocturnal  diaphoresis. CURRENT MEDICATIONS:  Vyvanse, metformin, magnesium, Flonase, and  Biotin. INTERPRETATION:  This sleep study was performed as a home sleep apnea  test because the insurance carrier denied an in-lab study. An ApneaLink  air monitoring device was utilized for the test.  Total recording time  was 8 hours and 56 minutes. Flow evaluation time was 8 hours and 38  minutes and oxygen saturation evaluation time was 8 hours and 40  minutes. Review of the raw data indicates sufficient information to  adequately interpret the study. RESPIRATION SUMMARY:  APNEA:  There were 14 apneic events including 13 obstructive and one  central.    HYPOPNEA:  There were 10 hypopneic events. RESPIRATORY EVENT INDEX:  The respiratory event index was less than 3. OXYGEN SATURATION:  Baseline oxygen saturation was 99%. Lowest  saturation was 87%. The patient spent less than 1% of the time with  saturation less than 90%. HEART RATE SUMMARY:  Average heart rate was 73 beats per minute. Maximum heart rate was 111 beats per minute and minimum heart rate was  56 beats per minute. SNORING:  There were 164 snore events, snoring was mild. MISCELLANEOUS:  Lopez Sleepiness Scale score is 7/24. BMI is 32.3  kg/m2. Neck circumference is 17 inches. IMPRESSION:  1.   No evidence of sleep apnea. 2.  Excellent oxygen saturation. 3.  Minimal to mild snoring. SUGGESTIONS:  1.  Dr. Nam Arora to discuss the results of study with the patient. 2.  No indication to treat for sleep apnea.         Dione Fernando MD  Diplomat of Sleep Medicine    D: 03/04/2022 14:29:12       T: 03/04/2022 14:31:28     [email or web address]  Job#: [id number]     Doc#: [id number]    CC:

## 2022-04-21 DIAGNOSIS — J31.0 CHRONIC RHINITIS: ICD-10-CM

## 2022-04-21 RX ORDER — FLUTICASONE PROPIONATE 50 MCG
SPRAY, SUSPENSION (ML) NASAL
Qty: 1 EACH | Refills: 0 | Status: SHIPPED
Start: 2022-04-21 | End: 2022-05-16

## 2022-04-22 ENCOUNTER — HOSPITAL ENCOUNTER (OUTPATIENT)
Dept: GENERAL RADIOLOGY | Age: 37
Discharge: HOME | End: 2022-04-24
Payer: OTHER GOVERNMENT

## 2022-04-22 DIAGNOSIS — Z12.31 VISIT FOR SCREENING MAMMOGRAM: ICD-10-CM

## 2022-04-22 PROCEDURE — 77063 BREAST TOMOSYNTHESIS BI: CPT

## 2022-05-15 DIAGNOSIS — J31.0 CHRONIC RHINITIS: ICD-10-CM

## 2022-05-16 RX ORDER — FLUTICASONE PROPIONATE 50 MCG
SPRAY, SUSPENSION (ML) NASAL
Qty: 16 EACH | Refills: 5 | Status: SHIPPED | OUTPATIENT
Start: 2022-05-16

## 2022-06-24 NOTE — PROGRESS NOTES
[facility] Sleep Medicine    Patient Name: Laverne Baez  Age: 30 y.o.   : 1985    Date of Visit: 22      HPI   Laverne Baez is a 30 y.o. female with  has a past medical history of Arthritis, Asthma, and Lesion of mouth. She presents as a new patient to Sleep Clinic, referred by Dr. iLsa Kendrick, for Headache and Insomnia   . Patient presents to establish with sleep medicine, referred by her PCP, for several sleep disturbances. Patient explains how she has difficulty with bouts of insomnia that occur anywhere from once per week, to every several months that last around 1-4 days. During this time, she especially has difficulty falling asleep, stating that sometimes she will be up \"until morning, watching the news\". This started about 2 years ago, with no identified trigger or new trauma at that time. When this occurs, she feels \"exhausted\" the following day and has difficulty with getting through the day functional.    She does note that there is a correlation between these episodes and her menstrual cycles, as well as a correlation between mood changes. She does have a history of ADHD, PTSD, and depression, and follows with psychiatrist and counselor at [facility]. Through the night, she often wakes up with \"bouts of energy\", she will get up to either eat or feels the need to \"accomplish\" something such as laundry or chores. Patient does take Vyvance for ADHD and dose was recently lowered from 60 mg to 50 mg daily. She states that over the past 2 weeks, she has been more erratic with taking her medication regularly, but states if it gets to be past 7:30-8 am, she will just not take it for the day. Patient tries to keep a regular bedtime routine by putting her kids to sleep, talking to her boyfrined on the phone for 30-60 min and then laying down. She sleeps with her cat and dog in bed, usually on her stomach or side.  Her bedtime varies between 9-11 pm. She drinks 0.5-1 cup of coffee per day, but not after noon. Patient does note that she \"worries\" about falling asleep and the more she thinks about it, the more she is up restless and tossing/turning. Patient has tried meditation, white noise, weighted blankets, and sleeping pills (ambien) in the past. She currently does not take sleeping pills. Patient tries not to nap during the day, although she feels tired. As far as sleep apnea symptoms, she has been told that she snores and she often wakes up with morning headaches. Previously had home sleep study with an AHI of <3. Patient has lost about 50-60 pounds over the last year intentionally. Sleep Study History: 3/1/2022- Home Sleep Study- TERENCE <3, SpO2 joanne 87%, T<90% was less than 1% of total O2 recording time. Bed time: Erractic 9-11 pm--Depending on kids and day     Wake time: For work, 6:15- [phone] am   Sleep Latency (min): Immediately when she is not having difficutly . She will fall asleep at 4-5am on \"bad\" days. Sleep Medications: Yamilka Hinojosa in the past--did not like side effects. Not taking anything now. Awakenings:  2, 4:30, 5 am     / bathroom  / falls back asleep quickly   Estimated Sleep time (hours):  variable  Daytime Naps: Tries not to   Sleep disturbances: None  Sleep Location: Bed  Sleep environment: Sleeps alone stomach and side     SLEEP HYGIENE     Activities before bed: TV  Caffeine:  0.5-1 cup in the am- no caffeine past noon   Coffee    0   Soda    0   Tea  Alcohol: rare  Tobacco: N     Sleep ROS:     Snoring: Y   Witnessed apneas: N  AM Headache: Y  Dry Mouth: N  Daytime Sleepiness: Y  Difficulty remembering things: Y  MVA  or near miss accident due to sleepiness in the past? N  Tonsillectomy? Y, removed  Have you lost or gained weight recently?  Lost 50-60 pounds intentionally    PARASOMNIAS/ NARCOLEPSY:  Hypnogogic/Hynopompic Hallucinations: N   Sleep paralysis: N  Cataplexy: N   REM behavior symptoms: N  Nightmare: N   Sleep Walking: N  Sleep Talking: Y  RLS Symptoms: N      Sleep Medicine 6/27/2022 10/12/2021   Sitting and reading 1 2   Watching TV 2 1   Sitting, inactive in a public place (e.g. a theatre or a meeting) 0 0   As a passenger in a car for an hour without a break 1 0   Lying down to rest in the afternoon when circumstances permit 3 3   Sitting and talking to someone 0 0   Sitting quietly after a lunch without alcohol 0 0   In a car, while stopped for a few minutes in traffic 0 0   Total score 7 6         PMH:  Past Medical History:   Diagnosis Date    Arthritis     Asthma     not taking any medications    Lesion of mouth         PSH:  Past Surgical History:   Procedure Laterality Date    CHOLECYSTECTOMY, LAPAROSCOPIC  2010    COLONOSCOPY      LAPAROSCOPY      Ex-lap, abdominal, removed implanted IUD    [facility] BREAST BIOPSY RIGHT Right 4/5/2021    [facility] LOC BREAST BIOPSY RIGHT 4/5/2021 [facility]    OTHER SURGICAL HISTORY Right 5/2/2016    tonsil mass incision    TONSILLECTOMY  2004    UPPER GASTROINTESTINAL ENDOSCOPY      WISDOM TOOTH EXTRACTION          Soc Hx:  Social History     Tobacco Use    Smoking status: Never Smoker    Smokeless tobacco: Never Used   Substance Use Topics    Alcohol use:  Yes     Alcohol/week: 0.0 standard drinks     Comment: socially    Drug use: No        Fam Hx:  Family History   Problem Relation Age of Onset    High Blood Pressure Mother     Breast Cancer Mother 35    Thyroid Cancer Mother 44    No Known Problems Father         Heart issues run in family    Cancer Maternal Aunt         skin    Pancreatic Cancer Maternal Grandfather 48    Breast Cancer Maternal Cousin 39    Breast Cancer Maternal Cousin 28        second cousin    No Known Problems Sister     No Known Problems Brother     No Known Problems Maternal Grandmother         Current Outpatient Medications   Medication Sig Dispense Refill    lamoTRIgine (LAMICTAL) 25 MG tablet TAKE 1 TABLET BY MOUTH AT BEDTIME FOR 7 DAYS, THEN INCREASE TO 2 TABLETS AT BEDTIME THEREAFTER      fluticasone (FLONASE) 50 MCG/ACT nasal spray PLACE 1 SPRAY INTO EACH NOSTRIL EVERY DAY 16 each 5    PROAIR  (90 Base) MCG/ACT inhaler INHALE 2 PUFFS BY MOUTH EVERY 4 TO 6 HOURS      fluconazole (DIFLUCAN) 150 MG tablet TAKE 1 TABLET BY MOUTH EVERY 72 HOURS AS NEEDED FOR YEAST INFECTION      magnesium oxide (MAG-OX) 400 MG tablet TAKE 1 TABLET BY MOUTH EVERY DAY 30 tablet 5    vitamin B-2 (RIBOFLAVIN) 100 MG TABS tablet TAKE 2 TABLETS BY MOUTH EVERY DAY 60 tablet 5    VYVANSE 60 MG CAPS Take 1 capsule by mouth daily.  metFORMIN (GLUCOPHAGE) 500 MG tablet Take 1 tablet by mouth 3 times daily      ibuprofen (ADVIL;MOTRIN) 800 MG tablet Take 800 mg by mouth every 6 hours as needed for Pain      naproxen (NAPROSYN) 250 MG tablet Take 2 tablets by mouth every 12 hours 60 tablet 0    cetirizine (ZYRTEC) 10 MG tablet Take 10 mg by mouth daily       No current facility-administered medications for this visit. Review of Systems  (Sleep ROS above)     Constitutional: no chills, no fever   Eyes: no blurred vision    Cardiovascular: no chest pain, no lower extremity edema. Respiratory: no cough, no shortness of breath   Gastrointestinal:  no nausea,  no vomiting, no diarrhea. Neurological:  no dizziness,  no headache, no memory changes  Endocrine: No changes in appetite, no feelings of weakness    Objective:   Physical Exam  /84 (Site: Left Upper Arm, Position: Sitting, Cuff Size: Medium Adult)   Pulse 71   Resp 12   Ht 5' 9\" (1.753 m)   Wt 211 lb (95.7 kg)   SpO2 98%   BMI 31.16 kg/m²      There were no vitals filed for this visit. General: No acute distress. BMI of Body mass index is 31.16 kg/m². HEENT: Normocephalic, atraumatic. No oropharyngeal lesions. Mallampati class- 3  Tonsils- Surgically removed  Neck: Trachea midline  Lungs: Clear to auscultation bilaterally.  No wheezes or crackles  Cardiac: Regular rate and rhythm. No murmurs appreciated. Neurologic: Normal gait. Balance intact. Psychiatric: Alert and oriented. Appropriate affect. PERTINENT LAB RESULTS  TSH   Date Value Ref Range Status   09/02/2021 0.994 0.270 - 4.200 uIU/mL Final      No results found for: FERRITIN     Assessment:      Pascual Mary was seen today for headache and insomnia. Diagnoses and all orders for this visit:    Obstructive sleep apnea  -     Baseline Diagnostic Sleep Study; Future    Insomnia, unspecified type    Obesity, unspecified classification, unspecified obesity type, unspecified whether serious comorbidity present    ·    Plan:      1. Chronic Sleep Initiation/Maintenance Insomnia     -Patient reports bouts of intermittent difficulty falling and staying sleep, for the past several years, no identified trigger. Likely multifactorial in nature: poor sleep hygiene + underlying anxiety + ADHD + psychophysiologic component.  -Introduced Cognitive Behavioral Therapy for Insomnia (first line therapy for psychophysiologic insomnia) as ideal way to manage insomnia symptoms. Briefly reviewed its core concepts, including cognitive restructuring, sleep scheduling, stimulus control, relaxation techniques, and sleep hygiene.  -Will make referral to virtual sleep psychologist for CBT-I initiation through Dr. Gissell Morris program. Patient open to this.  -Discussed sleep hygiene including avoiding electronics prior to bed, not performing daytime activities in bed, keeping routine bedtime, avoid taking Vyvance too late in the day. Avoid caffeine past noon.   -Advised against sleeping pills at this time.     2. Suspected Sleep Apnea    -Home sleep study performed several months ago did not indicate sleep disordered breathing.  -Given patient's symptoms and decreased sensitivity of home study vs in lab study, will proceed with in-lab PSG at [facility] to further rule out LUIS M as contributing factor of daytime fatigue and nocturnal awakenings. Refer for in lab sleep study.  -Counseled on risks of driving while drowsy. Recommended a short, 10-15 min power nap (in a safe location, with car doors locked) as most effective tool if experiencing drowsiness while driving. 3. Obesity (Body mass index is 31.16 kg/m².)     -Discussed impact of weight gain on LUIS M severity. Patient understands that LUIS M severity may improve with weight loss but no guarantee of cure can be made. Patient will follow up Return in about 4 months (around 10/27/2022) for Sleep Study Results, Follow up for insomnia.     SYLVIA Yang-CNP  [address] [facility]  P -[phone] option 2  - [phone]

## 2022-06-27 ENCOUNTER — OFFICE VISIT (OUTPATIENT)
Dept: SLEEP MEDICINE | Age: 37
End: 2022-06-27
Payer: OTHER GOVERNMENT

## 2022-06-27 VITALS
HEIGHT: 69 IN | WEIGHT: 211 LBS | OXYGEN SATURATION: 98 % | RESPIRATION RATE: 12 BRPM | DIASTOLIC BLOOD PRESSURE: 84 MMHG | SYSTOLIC BLOOD PRESSURE: 121 MMHG | BODY MASS INDEX: 31.25 KG/M2 | HEART RATE: 71 BPM

## 2022-06-27 DIAGNOSIS — G47.33 OBSTRUCTIVE SLEEP APNEA: Primary | ICD-10-CM

## 2022-06-27 DIAGNOSIS — G47.00 INSOMNIA, UNSPECIFIED TYPE: ICD-10-CM

## 2022-06-27 DIAGNOSIS — E66.9 OBESITY, UNSPECIFIED CLASSIFICATION, UNSPECIFIED OBESITY TYPE, UNSPECIFIED WHETHER SERIOUS COMORBIDITY PRESENT: ICD-10-CM

## 2022-06-27 PROCEDURE — G8417 CALC BMI ABV UP PARAM F/U: HCPCS | Performed by: NURSE PRACTITIONER

## 2022-06-27 PROCEDURE — G8427 DOCREV CUR MEDS BY ELIG CLIN: HCPCS | Performed by: NURSE PRACTITIONER

## 2022-06-27 PROCEDURE — 99203 OFFICE O/P NEW LOW 30 MIN: CPT | Performed by: NURSE PRACTITIONER

## 2022-06-27 RX ORDER — LAMOTRIGINE 25 MG/1
TABLET ORAL
COMMUNITY
Start: 2022-06-20 | End: 2022-08-15 | Stop reason: ALTCHOICE

## 2022-06-27 ASSESSMENT — SLEEP AND FATIGUE QUESTIONNAIRES
HOW LIKELY ARE YOU TO NOD OFF OR FALL ASLEEP WHILE SITTING AND TALKING TO SOMEONE: 0
HOW LIKELY ARE YOU TO NOD OFF OR FALL ASLEEP WHEN YOU ARE A PASSENGER IN A CAR FOR AN HOUR WITHOUT A BREAK: 1
HOW LIKELY ARE YOU TO NOD OFF OR FALL ASLEEP IN A CAR, WHILE STOPPED FOR A FEW MINUTES IN TRAFFIC: 0
HOW LIKELY ARE YOU TO NOD OFF OR FALL ASLEEP WHILE SITTING QUIETLY AFTER LUNCH WITHOUT ALCOHOL: 0
HOW LIKELY ARE YOU TO NOD OFF OR FALL ASLEEP WHILE SITTING AND READING: 1
HOW LIKELY ARE YOU TO NOD OFF OR FALL ASLEEP WHILE SITTING INACTIVE IN A PUBLIC PLACE: 0
HOW LIKELY ARE YOU TO NOD OFF OR FALL ASLEEP WHILE WATCHING TV: 2
ESS TOTAL SCORE: 7
HOW LIKELY ARE YOU TO NOD OFF OR FALL ASLEEP WHILE LYING DOWN TO REST IN THE AFTERNOON WHEN CIRCUMSTANCES PERMIT: 3

## 2022-06-27 NOTE — PATIENT INSTRUCTIONS
It was a pleasure seeing you today Mega Blair! Summary of Today's Visit      - In lab sleep study at [facility]     -Please do not drive when you are sleepy   -Please sign a request of records consent form, so that we may receive all of your previous sleep study reports and clinical notes, if they were not available today. - Please schedule a 2 month follow up today, to go over results          It is always advised that you check with your insurance company to determine how your study will be covered. For general questions or scheduling issues, call our nurse        Jayson Kulkarni [phone] option 2  f- [phone]           The general categories for the treatment of Sleep Apnea include:     1. Supportive Care  -Elevate the head of the bed   -Avoid sleeping on your back      2. Weight loss  -Start a healthy weight loss program     3. Oral Appliance \"Mouth Piece\"  (Mandibular Advancement Device)     4. Positive pressure therapy with a machine and a mask (CPAP or BiPAP)     5. Different kinds of surgeries, including nasal surgery, surgery of the throat/windpipe, and nerve stimulation therapy (INSPIRE). Helpful Web Sites: For patients with ALL SLEEP DISORDERS: American Academy of Sleep Medicine http://sleepeducation. org; or Waffl.com RestaurJam[employer]: https://sleepfoundation. org  For patients with LUIS M: American Sleep Apnea Association: http://lowe.org/. org  For patients with RLS: RLS Foundation: Wenceslao.beau  For patients with INSOMNIA: https://www.lorenzo.org/. org/articles/sleep/insomnia-causes-and-cures. htm  For patients with DEPRESSION: Arthur [employer]stone [employer].com.Kid$Shirt. com/depression         Sleep Medicine Terms     CPAP - Continuous Positive Airway Pressure - 1 set pressure (i.e. 7 cwp)  APAP - Automatic Positive Airway Pressure - PAP device determines in real time what pressure will work best confined to certain settings. (I.e. 4-15 like a hotel room. · Small pads or patches called electrodes will be placed on your head and body with a small amount of glue and tape. These will record things like brain activity, eye movement, oxygen levels, and snoring. · Soft elastic belts will be placed around your chest and belly to measure your breathing. · Your blood oxygen levels will be checked by a small clip (oximeter) placed either on the tip of your index finger or on your earlobe. · If you have sleep apnea, you may wear a mask that is connected to a continuous positive airway pressure (CPAP) machine. · Depending on the type of test, you will be allowed to sleep through the night or you'll be awakened periodically and asked to stay awake for a while. · If you use portable sleep monitoring, follow the instructions your doctor gave you. How long does the test take? · You will stay in the sleep lab overnight. For some tests, you will also stay part of the next day. What happens after the test?  · You may not sleep well during the test and may be tired the next day. · After your sleep problem has been identified, you may need a second study if your doctor orders treatment such as CPAP. Follow-up care is a key part of your treatment and safety. Be sure to make and go to all appointments, and call your doctor if you are having problems. It's also a good idea to keep a list of the medicines you take. Ask your doctor when you can expect to have your test results. Where can you learn more? Go to https://Sonru.com.Algaeventure Systems. org and sign in to your Volve account. Enter T649 in the KOPIS MOBILE box to learn more about \"Sleep Studies: About This Test.\"     If you do not have an account, please click on the \"Sign Up Now\" link. Current as of: February 24, 2020               Content Version: 12.5  © 1160-1814 Healthwise, Incorporated. Care instructions adapted under license by [facility] [employer] [facility] ([facility]).  If you have questions about a medical condition or this instruction, always ask your healthcare professional. [place] [address] any warranty or liability for your use of this information.

## 2022-07-11 RX ORDER — OXCARBAZEPINE 300 MG/1
300 TABLET, FILM COATED ORAL NIGHTLY
Qty: 90 TABLET | Refills: 1 | OUTPATIENT
Start: 2022-07-11

## 2022-07-14 ENCOUNTER — HOSPITAL ENCOUNTER (EMERGENCY)
Age: 37
Discharge: HOME | End: 2022-07-14

## 2022-07-15 ENCOUNTER — HOSPITAL ENCOUNTER (EMERGENCY)
Age: 37
Discharge: HOME | End: 2022-07-15
Attending: EMERGENCY MEDICINE
Payer: OTHER GOVERNMENT

## 2022-07-15 ENCOUNTER — APPOINTMENT (OUTPATIENT)
Dept: CT IMAGING | Age: 37
End: 2022-07-15
Payer: OTHER GOVERNMENT

## 2022-07-15 ENCOUNTER — APPOINTMENT (OUTPATIENT)
Dept: GENERAL RADIOLOGY | Age: 37
End: 2022-07-15
Payer: OTHER GOVERNMENT

## 2022-07-15 VITALS
WEIGHT: 210 LBS | BODY MASS INDEX: 31.1 KG/M2 | DIASTOLIC BLOOD PRESSURE: 84 MMHG | OXYGEN SATURATION: 100 % | SYSTOLIC BLOOD PRESSURE: 113 MMHG | RESPIRATION RATE: 14 BRPM | HEART RATE: 98 BPM | HEIGHT: 69 IN | TEMPERATURE: 97.7 F

## 2022-07-15 DIAGNOSIS — M25.561 ACUTE PAIN OF RIGHT KNEE: Primary | ICD-10-CM

## 2022-07-15 LAB
HCG, URINE, POC: NEGATIVE
Lab: NORMAL
NEGATIVE QC PASS/FAIL: NORMAL
POSITIVE QC PASS/FAIL: NORMAL

## 2022-07-15 PROCEDURE — 73700 CT LOWER EXTREMITY W/O DYE: CPT

## 2022-07-15 PROCEDURE — 96372 THER/PROPH/DIAG INJ SC/IM: CPT

## 2022-07-15 PROCEDURE — 6360000002 HC RX W HCPCS

## 2022-07-15 PROCEDURE — 99284 EMERGENCY DEPT VISIT MOD MDM: CPT

## 2022-07-15 PROCEDURE — 73552 X-RAY EXAM OF FEMUR 2/>: CPT

## 2022-07-15 RX ORDER — HYDROCODONE BITARTRATE AND ACETAMINOPHEN 5; 325 MG/1; MG/1
1 TABLET ORAL EVERY 6 HOURS PRN
Qty: 12 TABLET | Refills: 0 | Status: SHIPPED | OUTPATIENT
Start: 2022-07-15 | End: 2022-07-18

## 2022-07-15 RX ORDER — KETOROLAC TROMETHAMINE 30 MG/ML
30 INJECTION, SOLUTION INTRAMUSCULAR; INTRAVENOUS ONCE
Status: COMPLETED | OUTPATIENT
Start: 2022-07-15 | End: 2022-07-15

## 2022-07-15 RX ADMIN — KETOROLAC TROMETHAMINE 30 MG: 30 INJECTION, SOLUTION INTRAMUSCULAR; INTRAVENOUS at 09:52

## 2022-07-15 ASSESSMENT — ENCOUNTER SYMPTOMS
NAUSEA: 1
COLOR CHANGE: 1
EYE PAIN: 0
ABDOMINAL PAIN: 0
SHORTNESS OF BREATH: 0
RHINORRHEA: 0
VOMITING: 0

## 2022-07-15 ASSESSMENT — PAIN - FUNCTIONAL ASSESSMENT: PAIN_FUNCTIONAL_ASSESSMENT: 0-10

## 2022-07-15 ASSESSMENT — PAIN DESCRIPTION - LOCATION: LOCATION: KNEE

## 2022-07-15 ASSESSMENT — PAIN SCALES - GENERAL: PAINLEVEL_OUTOF10: 6

## 2022-07-15 ASSESSMENT — PAIN DESCRIPTION - ORIENTATION: ORIENTATION: RIGHT

## 2022-07-15 NOTE — ED PROVIDER NOTES
Rosanna Nelson is a 30 y.o. female    Chief Complaint   Patient presents with    Knee Pain     Right knee injury         HPI   Rosanna Nelson is a 30 y.o. female presenting to the ED for Knee Pain (Right knee injury)    History comes primarily from the patient. Rich De Jesus is a 68-year-old female presenting for right knee pain starting 5 days ago. She states she was going on a slip and slide and directly impacted her right knee on the ground. She states her pain has been getting worse ever since. Her pain is worse medially. She presented to urgent care yesterday and had a normal right knee x-ray. She is able to walk on the knee however feels unstable. She also reports pain shooting down from the knee with prolonged standing. She endorses headache, dizziness, nausea, numbness and tingling in the right knee when standing associated with her pain. Review of Systems   Constitutional:  Negative for fever. HENT:  Negative for ear pain and rhinorrhea. Eyes:  Negative for pain. Respiratory:  Negative for shortness of breath. Cardiovascular:  Negative for chest pain and palpitations. Gastrointestinal:  Positive for nausea. Negative for abdominal pain and vomiting. Genitourinary:  Negative for difficulty urinating and dysuria. Musculoskeletal:  Negative for arthralgias and myalgias. Right knee pain   Skin:  Positive for color change (bruising to lateral right knee). Negative for rash. Neurological:  Positive for dizziness, numbness (right knee) and headaches. All other systems reviewed and are negative. Physical Exam  Constitutional:       Appearance: Normal appearance. HENT:      Head: Normocephalic and atraumatic. Nose: Nose normal.   Eyes:      Extraocular Movements: Extraocular movements intact. Pupils: Pupils are equal, round, and reactive to light. Cardiovascular:      Rate and Rhythm: Normal rate and regular rhythm.    Pulmonary:      Breath sounds: Normal breath sounds. Abdominal:      General: Abdomen is flat. Palpations: Abdomen is soft. Tenderness: There is no abdominal tenderness. Musculoskeletal:         General: Tenderness (right knee over patella) present. Normal range of motion. Cervical back: Normal range of motion. Skin:     General: Skin is warm and dry. Findings: Bruising (mild, lateral right knee) present. Neurological:      General: No focal deficit present. Mental Status: She is alert and oriented to person, place, and time. Psychiatric:         Behavior: Behavior normal.        Procedures     MDM     Patient presented to the Emergency Department for Knee Pain (Right knee injury)    Milagros Parisi is a 26-year-old female with traumatic right knee injury 5 days ago. CT non-contrast of the right knee no acute injuries. X-ray of the right femur showed no acute osseous findings. Patient given IM Toradol 30 mg for pain. Patient discharged with follow-up with PCP.           --------------------------------------------- PAST HISTORY ---------------------------------------------  Past Medical History:  has a past medical history of Arthritis, Asthma, and Lesion of mouth. Past Surgical History:  has a past surgical history that includes Tonsillectomy (2004); laparoscopy; [place] tooth extraction; other surgical history (Right, 5/2/2016); Cholecystectomy, laparoscopic (2010); Upper gastrointestinal endoscopy; Colonoscopy; and [facility] BREAST BX W LOC DEVICE 1ST LESION RIGHT (Right, 4/5/2021). Social History:  reports that she has never smoked. She has never used smokeless tobacco. She reports current alcohol use. She reports that she does not use drugs. Family History: family history includes Breast Cancer (age of onset: 35) in her mother; Breast Cancer (age of onset: 28) in her maternal cousin; Breast Cancer (age of onset: 39) in her maternal cousin; Cancer in her maternal aunt; High Blood Pressure in her mother;  No Known Problems in her brother, father, maternal grandmother, and sister; Pancreatic Cancer (age of onset: 48) in her maternal grandfather; Thyroid Cancer (age of onset: 44) in her mother. The patients home medications have been reviewed. Allergies: Bupropion and Seasonal    -------------------------------------------------- RESULTS -------------------------------------------------  Labs:  Results for orders placed or performed during the hospital encounter of 07/15/22   POC Pregnancy Urine Qual   Result Value Ref Range    HCG, Urine, POC Negative Negative    Lot Number [id number]     Positive QC Pass/Fail Pass     Negative QC Pass/Fail Pass        Radiology:  CT KNEE RIGHT WO CONTRAST   Final Result   No acute injuries identified in the right knee. XR FEMUR RIGHT (MIN 2 VIEWS)   Final Result   No acute osseous findings in the left femur             ------------------------- NURSING NOTES AND VITALS REVIEWED ---------------------------  Date / Time Roomed:  7/15/2022  7:59 AM  ED Bed Assignment:  13/13    The nursing notes within the ED encounter and vital signs as below have been reviewed. /84   Pulse 98   Temp 97.7 °F (36.5 °C) (Temporal)   Resp 14   Ht 5' 9\" (1.753 m)   Wt 210 lb (95.3 kg)   SpO2 100%   BMI 31.01 kg/m²   Oxygen Saturation Interpretation: Normal      ------------------------------------------ PROGRESS NOTES ------------------------------------------  12:36 PM EDT  I have spoken with the patient and discussed todays results, in addition to providing specific details for the plan of care and counseling regarding the diagnosis and prognosis. Their questions are answered at this time and they are agreeable with the plan. I discussed at length with them reasons for immediate return here for re evaluation. They will followup with their primary care physician by calling their office tomorrow.       --------------------------------- ADDITIONAL PROVIDER NOTES ---------------------------------  At this time the patient is without objective evidence of an acute process requiring hospitalization or inpatient management. They have remained hemodynamically stable throughout their entire ED visit and are stable for discharge with outpatient follow-up. The plan has been discussed in detail and they are aware of the specific conditions for emergent return, as well as the importance of follow-up. New Prescriptions    HYDROCODONE-ACETAMINOPHEN (NORCO) 5-325 MG PER TABLET    Take 1 tablet by mouth every 6 hours as needed for Pain for up to 3 days. Diagnosis:  1. Acute pain of right knee        Disposition:  Patient's disposition: Discharge to home  Patient's condition is stable. Strict return precautions were discussed including but not limited too new or worsening symtpoms. They verbalized understanding and were agreeable with the plan. All questions were answered and patient was discharged.         Kristan Rios MD  Resident  07/15/22 5175

## 2022-07-17 DIAGNOSIS — R51.9 DAILY HEADACHE: ICD-10-CM

## 2022-07-21 ENCOUNTER — HOSPITAL ENCOUNTER (OUTPATIENT)
Age: 37
Discharge: HOME | End: 2022-07-23

## 2022-07-21 PROCEDURE — 88305 TISSUE EXAM BY PATHOLOGIST: CPT

## 2022-07-21 PROCEDURE — 88307 TISSUE EXAM BY PATHOLOGIST: CPT

## 2022-08-15 ENCOUNTER — OFFICE VISIT (OUTPATIENT)
Dept: FAMILY MEDICINE CLINIC | Age: 37
End: 2022-08-15
Payer: OTHER GOVERNMENT

## 2022-08-15 VITALS
OXYGEN SATURATION: 98 % | BODY MASS INDEX: 32.41 KG/M2 | RESPIRATION RATE: 18 BRPM | WEIGHT: 218.8 LBS | HEART RATE: 82 BPM | HEIGHT: 69 IN | SYSTOLIC BLOOD PRESSURE: 108 MMHG | TEMPERATURE: 98.3 F | DIASTOLIC BLOOD PRESSURE: 64 MMHG

## 2022-08-15 DIAGNOSIS — R51.9 DAILY HEADACHE: Primary | ICD-10-CM

## 2022-08-15 DIAGNOSIS — F43.10 PTSD (POST-TRAUMATIC STRESS DISORDER): ICD-10-CM

## 2022-08-15 DIAGNOSIS — G93.5 CHIARI MALFORMATION TYPE I (HCC): ICD-10-CM

## 2022-08-15 DIAGNOSIS — F33.42 RECURRENT MAJOR DEPRESSIVE DISORDER, IN FULL REMISSION (HCC): ICD-10-CM

## 2022-08-15 DIAGNOSIS — G47.9 SLEEP DISTURBANCE: ICD-10-CM

## 2022-08-15 DIAGNOSIS — Z86.19 H/O HERPES ZOSTER: ICD-10-CM

## 2022-08-15 DIAGNOSIS — F90.0 ATTENTION DEFICIT HYPERACTIVITY DISORDER (ADHD), PREDOMINANTLY INATTENTIVE TYPE: ICD-10-CM

## 2022-08-15 PROCEDURE — G8417 CALC BMI ABV UP PARAM F/U: HCPCS | Performed by: FAMILY MEDICINE

## 2022-08-15 PROCEDURE — G8427 DOCREV CUR MEDS BY ELIG CLIN: HCPCS | Performed by: FAMILY MEDICINE

## 2022-08-15 PROCEDURE — 1036F TOBACCO NON-USER: CPT | Performed by: FAMILY MEDICINE

## 2022-08-15 PROCEDURE — 99214 OFFICE O/P EST MOD 30 MIN: CPT | Performed by: FAMILY MEDICINE

## 2022-08-15 RX ORDER — LISDEXAMFETAMINE DIMESYLATE 50 MG
CAPSULE ORAL
COMMUNITY
Start: 2022-06-27

## 2022-08-15 RX ORDER — MAGNESIUM OXIDE 400 MG/1
TABLET ORAL
Qty: 30 TABLET | Refills: 5 | Status: SHIPPED | OUTPATIENT
Start: 2022-08-15

## 2022-08-15 NOTE — PROGRESS NOTES
Subjective:  30 y.o. y/o female is here for f/u issues including depression.      Welts in scalp off/on, sensitive to push, reappearing  No change in soaps, etc  Thinks related to inconsistency with metformin (metformin clears her acne)    Had to cancel with [facility] neurologist who specializes in headaches; been busy and not tried to reschedule  Did see Dr. Samuel Silver, Chiari malformation type I, 4/27/21 OV, recommending Chiari decompression if no improvement after working with neurologist  Taking motrin  Always with headaches, no changes  Drinking plenty of water    Psych: Comprehensive, +depression and PTSD, +ADHD, +Vyvanse only now (stopped trileptal), doing OK, always fatigued   Acne resolves with metformin    Sleep medicine: Reviewed 6/22 OV, +chronic sleep initiation/maintenance insomnia and suspected sleep apnea, +home sleep study did not show LUIS M, but due to sensitivity and decreased sensitivity of home study +plan for In-lab sleep study 10/22  Dr. Walda Dancer program recommended, +patient tried and did not feel comfortable    Ob/gyn: +PCOS, Dr. Romero [facility], ibuprofen 800mg for cramping, +metformin 500mg BID (tolerating OK but loose stools)  Tried OCP, didn't tolerate well, affected mood  LEEP with conazation 7/22, HGSIL, +bactrim    Shingles 3 times  Last time 1 year ago    Review of Systems:  Constitutional:  No fever, + fatigue, no chills, + headaches, no weight change  Dermatology:  + rash, no mole, + dry or sensitive skin  ENT:  No cough, no sore throat, no sinus pain, no runny nose, no ear pain, +chronic congestion (controlled with flonase)  Cardiology:  No chest pain, +occ palpitations, no leg edema, no shortness of breath, no PND  Gastroenterology:  No dysphagia, no abdominal pain, no nausea, no vomiting, no constipation, no diarrhea, no heartburn  Musculoskeletal:  No joint pain, no leg cramps, + back pain, no muscle aches  Neuro:  No dizziness, no numbness/tingling  Respiratory:  No shortness of breath, no orthopnea, no wheezing, no MARKS  Urology:  No blood in the urine, + urinary frequency, no urinary incontinence, no urinary urgency, no nocturia, no dysuria    Patient's past medical, surgical, social and/or family history reviewed, updated in chart, and are non-contributory (unless otherwise stated). Medications and allergies also reviewed and updated in chart. Vitals:    08/15/22 0836   BP: 108/64   Pulse: 82   Resp: 18   Temp: 98.3 °F (36.8 °C)   TempSrc: Temporal   SpO2: 98%   Weight: 218 lb 12.8 oz (99.2 kg)   Height: 5' 9\" (1.753 m)     Body mass index is 32.31 kg/m². General:  Patient alert and oriented x 3, NAD, pleasant  HEENT:  Atraumatic, normocephalic, no tenderness, pupils equal, EOMI, clear conjunctiva, +mask  Neck:  Supple, no goiter, no carotid bruits, no LAD  Heart: RRR, no m/r/g  Lungs:  CTAB, no resp distress  Extremities:  No clubbing, cyanosis or edema  Skin: scalp with couple scabbed lesions with mild surrounding erythema and inflammation      Assessment/Plan:    Carlo Keenan was seen today for depression. Diagnoses and all orders for this visit:    Daily headache  Chiari malformation type I [facility])  Patient will work on scheduled with neurology at [facility] as recommended by Dr. Quinton Renner therapy, monitor    PTSD (post-traumatic stress disorder)  Recurrent major depressive disorder, in full remission [facility])  Attention deficit hyperactivity disorder (ADHD), predominantly inattentive type  Continue with psych as directed  Continue same meds  Doing well    Sleep disturbance  In-lab study scheduled 10/22  Recommended [facility] at [facility] for CBT for insomnia  F/u sleep medicine as recommended    H/O herpes zoster  No issue in over 1 year  Will wait for recurrence and then visit further workup and possible Shingrix series        As above. Call or go to ED immediately if symptoms worsen or persist.  Return in about 6 months (around 2/15/2023). , or sooner if necessary.       Spent over 30 minutes in direct and in-direct care of the patient on the day of her visit. Counseled regarding above diagnosis, including possible risks and complications,  especially if left uncontrolled. Counseled regarding the possible side effects, risks, benefits and alternatives to treatment; patient and/or guardian verbalizes understanding, agrees, feels comfortable with and wishes to proceed with above treatment plan. Advised patient to call with any new medication issues, and read all Rx info from pharmacy to assure aware of all possible risks and side effects of medication before taking. Patient and/or guardian verbalizes understanding and agrees with above counseling, assessment and plan. All questions answered.

## 2022-08-15 NOTE — PATIENT INSTRUCTIONS
Online CBT for insomnia at [facility]. See if you can't make an appointment with neurology for your headaches.

## 2022-10-19 ENCOUNTER — HOSPITAL ENCOUNTER (OUTPATIENT)
Dept: SLEEP CENTER | Age: 37
Discharge: HOME | End: 2022-10-19
Payer: OTHER GOVERNMENT

## 2022-10-19 DIAGNOSIS — G47.33 OBSTRUCTIVE SLEEP APNEA: ICD-10-CM

## 2022-10-19 PROCEDURE — 95810 POLYSOM 6/> YRS 4/> PARAM: CPT

## 2022-10-20 VITALS
BODY MASS INDEX: 32.29 KG/M2 | OXYGEN SATURATION: 97 % | HEIGHT: 69 IN | DIASTOLIC BLOOD PRESSURE: 80 MMHG | SYSTOLIC BLOOD PRESSURE: 118 MMHG | HEART RATE: 83 BPM | WEIGHT: 218 LBS

## 2022-10-20 ASSESSMENT — SLEEP AND FATIGUE QUESTIONNAIRES
HOW LIKELY ARE YOU TO NOD OFF OR FALL ASLEEP WHILE LYING DOWN TO REST IN THE AFTERNOON WHEN CIRCUMSTANCES PERMIT: 3
HOW LIKELY ARE YOU TO NOD OFF OR FALL ASLEEP WHEN YOU ARE A PASSENGER IN A CAR FOR AN HOUR WITHOUT A BREAK: 1
HOW LIKELY ARE YOU TO NOD OFF OR FALL ASLEEP WHILE WATCHING TV: 2
HOW LIKELY ARE YOU TO NOD OFF OR FALL ASLEEP WHILE SITTING INACTIVE IN A PUBLIC PLACE: 0
HOW LIKELY ARE YOU TO NOD OFF OR FALL ASLEEP WHILE SITTING AND TALKING TO SOMEONE: 0
HOW LIKELY ARE YOU TO NOD OFF OR FALL ASLEEP WHILE SITTING AND READING: 2
ESS TOTAL SCORE: 8
HOW LIKELY ARE YOU TO NOD OFF OR FALL ASLEEP IN A CAR, WHILE STOPPED FOR A FEW MINUTES IN TRAFFIC: 0
HOW LIKELY ARE YOU TO NOD OFF OR FALL ASLEEP WHILE SITTING QUIETLY AFTER LUNCH WITHOUT ALCOHOL: 0

## 2022-11-02 ENCOUNTER — OFFICE VISIT (OUTPATIENT)
Dept: SLEEP MEDICINE | Age: 37
End: 2022-11-02
Payer: OTHER GOVERNMENT

## 2022-11-02 VITALS
RESPIRATION RATE: 12 BRPM | WEIGHT: 224 LBS | SYSTOLIC BLOOD PRESSURE: 111 MMHG | DIASTOLIC BLOOD PRESSURE: 75 MMHG | HEIGHT: 69 IN | BODY MASS INDEX: 33.18 KG/M2 | HEART RATE: 108 BPM | OXYGEN SATURATION: 99 %

## 2022-11-02 DIAGNOSIS — F51.04 PSYCHOPHYSIOLOGICAL INSOMNIA: Primary | ICD-10-CM

## 2022-11-02 PROCEDURE — 1036F TOBACCO NON-USER: CPT | Performed by: INTERNAL MEDICINE

## 2022-11-02 PROCEDURE — G8417 CALC BMI ABV UP PARAM F/U: HCPCS | Performed by: INTERNAL MEDICINE

## 2022-11-02 PROCEDURE — G8427 DOCREV CUR MEDS BY ELIG CLIN: HCPCS | Performed by: INTERNAL MEDICINE

## 2022-11-02 PROCEDURE — G8484 FLU IMMUNIZE NO ADMIN: HCPCS | Performed by: INTERNAL MEDICINE

## 2022-11-02 PROCEDURE — 99214 OFFICE O/P EST MOD 30 MIN: CPT | Performed by: INTERNAL MEDICINE

## 2022-11-02 RX ORDER — AMITRIPTYLINE HYDROCHLORIDE 25 MG/1
TABLET, FILM COATED ORAL
COMMUNITY
Start: 2022-10-17

## 2022-11-02 ASSESSMENT — SLEEP AND FATIGUE QUESTIONNAIRES
HOW LIKELY ARE YOU TO NOD OFF OR FALL ASLEEP WHILE SITTING QUIETLY AFTER LUNCH WITHOUT ALCOHOL: 0
HOW LIKELY ARE YOU TO NOD OFF OR FALL ASLEEP WHILE LYING DOWN TO REST IN THE AFTERNOON WHEN CIRCUMSTANCES PERMIT: 3
ESS TOTAL SCORE: 8
HOW LIKELY ARE YOU TO NOD OFF OR FALL ASLEEP WHILE SITTING AND READING: 1
HOW LIKELY ARE YOU TO NOD OFF OR FALL ASLEEP IN A CAR, WHILE STOPPED FOR A FEW MINUTES IN TRAFFIC: 0
HOW LIKELY ARE YOU TO NOD OFF OR FALL ASLEEP WHILE WATCHING TV: 2
HOW LIKELY ARE YOU TO NOD OFF OR FALL ASLEEP WHILE SITTING INACTIVE IN A PUBLIC PLACE: 0
HOW LIKELY ARE YOU TO NOD OFF OR FALL ASLEEP WHEN YOU ARE A PASSENGER IN A CAR FOR AN HOUR WITHOUT A BREAK: 2
HOW LIKELY ARE YOU TO NOD OFF OR FALL ASLEEP WHILE SITTING AND TALKING TO SOMEONE: 0

## 2022-11-02 ASSESSMENT — ENCOUNTER SYMPTOMS
ALLERGIC/IMMUNOLOGIC NEGATIVE: 1
RESPIRATORY NEGATIVE: 1
GASTROINTESTINAL NEGATIVE: 1
EYES NEGATIVE: 1

## 2022-11-02 NOTE — PATIENT INSTRUCTIONS
FREE Insomnia Treatment Program - [facility] [email or web address]    - 1 hour wind down starting at 10 pm  - Only go to bed when sleepy  - When laying in bed, if you can't sleep and start to get frustrated, get out of bed and do something boring and relaxing. When you feel sleepy again, go back to bed  - Bed is for sleep only  - Do not look at the clock at all during sleep time  - Ask your doctor if Wellbutrin could be helpful for you?

## 2022-11-02 NOTE — PROGRESS NOTES
[facility]    Patient Name: Ester Jones  Age: 30 y.o.   : 1985    Date of Visit: 22        HPI   Ester Jones is a 30 y.o. female with  has a past medical history of Arthritis, Asthma, and Lesion of mouth. who presents as a follow-up patient to Sleep Clinic for Sleep Apnea  .   Sleep Medicine 2022 10/20/2022 2022 10/12/2021   Sitting and reading 1 2 1 2   Watching TV 2 2 2 1   Sitting, inactive in a public place (e.g. a theatre or a meeting) 0 0 0 0   As a passenger in a car for an hour without a break 2 1 1 0   Lying down to rest in the afternoon when circumstances permit 3 3 3 3   Sitting and talking to someone 0 0 0 0   Sitting quietly after a lunch without alcohol 0 0 0 0   In a car, while stopped for a few minutes in traffic 0 0 0 0   [place] Sleepiness Score 8 8 7 6   Neck circumference (Inches) - 15 - -     Schedule is sporadic due to kids sports  Dinner could be 5p or 8:30p    After dinner may take kids to sports, homework/music lessons, chores, yard work  Sometimes may be doing chores until midnight     When trying to sleep may listen to sound machine, watch TV  Sometimes gets out of bed and want to get things done    Typically wakes up 5:40 am before alarm, will fall back asleep until alarm goes off   Despite being wide awake at 5:40 am, when alarm goes off she has significant sleep inertia     On amitriptyline for insomnia, not sure if it's working    Single mom, significant stress from taking care of 2 young kids    PMH:  Past Medical History:   Diagnosis Date    Arthritis     Asthma     not taking any medications    Lesion of mouth         PSH:  Past Surgical History:   Procedure Laterality Date    CHOLECYSTECTOMY, LAPAROSCOPIC      COLONOSCOPY      LAPAROSCOPY      Ex-lap, abdominal, removed implanted IUD    LEEP  2022    Dr. Joel Aponte    [facility] BREAST BIOPSY RIGHT Right 2021    [facility] BREAST BIOPSY RIGHT 2021 [facility]    OTHER SURGICAL HISTORY Right 05/02/2016    tonsil mass incision    TONSILLECTOMY  2004    UPPER GASTROINTESTINAL ENDOSCOPY      WISDOM TOOTH EXTRACTION            Soc Hx:  Social History     Tobacco Use    Smoking status: Never    Smokeless tobacco: Never   Vaping Use    Vaping Use: Never used   Substance Use Topics    Alcohol use: Yes     Alcohol/week: 0.0 standard drinks     Comment: socially    Drug use: No        Fam Hx:  Family History   Problem Relation Age of Onset    High Blood Pressure Mother     Breast Cancer Mother 35    Thyroid Cancer Mother 44    No Known Problems Father         Heart issues run in family    Cancer Maternal Aunt         skin    Pancreatic Cancer Maternal Grandfather 48    Breast Cancer Maternal Cousin 39    Breast Cancer Maternal Cousin 28        second cousin    No Known Problems Sister     No Known Problems Brother     No Known Problems Maternal Grandmother         Current Outpatient Medications   Medication Sig Dispense Refill    magnesium oxide (MAG-OX) 400 MG tablet TAKE 1 TABLET BY MOUTH EVERY DAY 30 tablet 5    VYVANSE 50 MG capsule TAKE 1 CAPSULE BY MOUTH EVERY DAY FOR 30 DAYS      vitamin B-2 (RIBOFLAVIN) 100 MG TABS tablet TAKE 2 TABLETS BY MOUTH EVERY DAY 60 tablet 5    fluticasone (FLONASE) 50 MCG/ACT nasal spray PLACE 1 SPRAY INTO EACH NOSTRIL EVERY DAY 16 each 5    metFORMIN (GLUCOPHAGE) 500 MG tablet Take 1 tablet by mouth 3 times daily      ibuprofen (ADVIL;MOTRIN) 800 MG tablet Take 800 mg by mouth every 6 hours as needed for Pain      amitriptyline (ELAVIL) 25 MG tablet TAKE 1 TABLET ORALLY ONCE PER DAY FOR 30 DAYS AT BEDTIME       No current facility-administered medications for this visit. Review of Systems  (Sleep ROS above)  Review of Systems   Constitutional:  Positive for fatigue. HENT: Negative. Eyes: Negative. Respiratory: Negative. Cardiovascular: Negative. Gastrointestinal: Negative. Endocrine: Negative.     Genitourinary: Negative. Skin: Negative. Allergic/Immunologic: Negative. Neurological: Negative. Hematological: Negative. Psychiatric/Behavioral:  Positive for sleep disturbance. Objective:   Physical Exam  /75 (Site: Left Upper Arm, Position: Sitting, Cuff Size: Large Adult)   Pulse (!) 108   Resp 12   Ht 5' 9\" (1.753 m)   Wt 224 lb (101.6 kg)   SpO2 99%   BMI 33.08 kg/m²        Physical Exam  Constitutional:       General: She is not in acute distress. Pulmonary:      Effort: No respiratory distress. Musculoskeletal:         General: No deformity. Neurological:      Mental Status: She is alert. Psychiatric:         Mood and Affect: Mood normal.         Thought Content: Thought content normal.           Sleep Medicine 11/2/2022 10/20/2022 6/27/2022 10/12/2021   Sitting and reading 1 2 1 2   Watching TV 2 2 2 1   Sitting, inactive in a public place (e.g. a theatre or a meeting) 0 0 0 0   As a passenger in a car for an hour without a break 2 1 1 0   Lying down to rest in the afternoon when circumstances permit 3 3 3 3   Sitting and talking to someone 0 0 0 0   Sitting quietly after a lunch without alcohol 0 0 0 0   In a car, while stopped for a few minutes in traffic 0 0 0 0   [place] Sleepiness Score 8 8 7 6   Neck circumference (Inches) - 15 - -         SLEEP STUDY HISTORY      3/1/2022- Home Sleep Study- TERENCE <3, SpO2 joanne 87%, T<90% was less than 1% of total O2 recording time. PERTINENT LAB RESULTS  No results found for: FERRITIN       Assessment:      Rai was seen today for sleep apnea. Diagnoses and all orders for this visit:    Psychophysiological insomnia     Plan:      Advised regulating sleep schedule. Wind down routine 10 pm every night for at least 60 minutes, focus on relaxing activities. Discussed sleep restriction, avoiding clock watching during the night, sleep hygiene tips. Will discontinue referral to CBTi program. BBTi in office today.       Patient will follow up No follow-ups on file.     Cain Velez DO  Sleep Medicine   [facility] Phone -[phone], option 2  Fax- [phone]

## 2022-11-17 NOTE — PROGRESS NOTES
[address] [address]    SLEEP STUDY REPORT     PATIENT NAME:  Otto Singh                      :  1985  MED REC NO:   [id number]                          ACCOUNT NO:  [de-identified]                              PROVIDER:  Blake Burton MD     DATE OF STUDY:  10/19/2022     FULL NIGHT POLYSOMNOGRAM REPORT     LOCATION:  [address] [place] [address]     REFERRING PROVIDER:  SYLVIA Figueroa    AGE: 30 yrs      SEX: Female          HEIGHT: 5 ft 9 in         WEIGHT: 218 lbs          BMI: 32.2 kg/m2    NECK CIRCUMFERENCE: 15 in    Symptoms: Nocturnal choking/gasping, loud snoring, morning headaches, night sweats, difficulty with memory/concentration, waking with heart pounding, nocturia, sleeptalking, vivid dreams, difficulty falling/staying asleep. The [place] Sleepiness Scale was 8 out of 24 (scores above or equal to 10 are suggestive of hypersomnolence). Indication: Suspected obstructive sleep apnea. Medical History:  Obesity, asthma, depression, PTSD, anxiety, insomnia, headaches. Medications: Metformin, Flonase, Vyvanse, Vitamin B2, magnesium, amitriptyline. DESCRIPTION: This full night polysomnogram consisted of EEG, EOG, EMG and 2-lead ECG monitoring. Oronasal airflow (nasal pressure transducer and thermistor), chest and abdominal efforts by respiratory inductance plethysmography or polyvinylidene fluoride (PVDF) sensor, and pulse oximetry were monitored as well. Hypopneas were scored as at least a 30% reduction in amplitude of the semi-quantitative flow signal, associated with a 4% or greater oxygen desaturation.  Respiratory effort related arousals (RERAs) were scored as at least a 30% reduction in amplitude of the semi-quantitative flow signal, associated with an EEG microarousal.     FINDINGS:  SLEEP CONTINUITY AND SLEEP ARCHITECTURE:  Lights were turned off at 10:48 PM and lights were turned on at 5:49 AM. Total recording time was 421 minutes and total sleep time was 385 minutes. The overall sleep efficiency was 91%. Sleep onset latency was normal at 24 minutes and REM latency was slightly increased at 132 minutes. There were 17 brief awakenings during this study. The duration of wakefulness after sleep onset was 13 minutes. The amount of N1 sleep was 5% of total sleep time, or 19 minutes. The amount of N2 sleep was 60% of total sleep time, or 231 minutes. The amount of REM sleep was 21.5% of total sleep time, or 83 minutes. Slow wave sleep was 13.5% of total sleep time, or 53 minutes. The microarousal index was normal.    RESPIRATORY MONITORING:  This study documented 0 obstructive apneas, 0 central apneas, 0 mixed apneas, 2 hypopneas, and 0 respiratory effort related arousals (RERAs) during the total recorded sleep time. The overall apnea/hypopnea index (AHI) was 0.3. The overall respiratory disturbance index (RDI includes RERAs) was 0.3. The non-REM RDI was 0 and the REM RDI was 1.5. The patient slept in the supine position for 40% of the total sleep time, and the supine RDI was 0.4. Snoring ranged in intensity from mild to loud. Flow limitation was present, but did not rise to the level of overt respiratory events as per CMS scoring criteria. Snoring and flow limitation appeared more prominently in supine and REM sleep. The average oxyhemoglobin saturation was 96% while awake, 95% during non-REM sleep and 96% during REM sleep. The overall 4% oxygen desaturation index during sleep was 0.3. The lowest oxygen saturation during sleep was 90%. EEG: No abnormal epileptiform activity was observed. ECG: The electrocardiogram documented normal sinus rhythm. The average heart rate during sleep was 78 beats per minute. PERIODIC LIMB MOVEMENTS: No periodic limb movements were noted. EMG/VIDEO MONITORING: Loss of REM atonia, bruxism, and parasomnias were not observed. IMPRESSION:   1.   This study does not show evidence of significant sleep disordered breathing as per CMS scoring criteria, although flow limitation and snoring were evident in the supine and REM sleep. This study is considered to be reliable, given the adequate amounts of both REM and supine sleep achieved. RECOMMENDATIONS:    1. Further evaluation of this patient's sleep complaints is recommended, as this study does not show evidence of sleep disordered breathing. If symptoms are bothersome to her, positional therapy may be attempted in order to mitigate supine-related flow limitation. A referral to ENT may be considered to address snoring, if bothersome to the patient. 2.  The patient should be aware that weight gain, aging, and menopause may increase the risk for obstructive sleep apnea. Repeat testing may be necessary in the future to reevaluate. 3.  The patient should be strongly counseled against driving while drowsy.     Navdeep Mohamud MD

## 2022-11-18 ENCOUNTER — TELEPHONE (OUTPATIENT)
Dept: SLEEP CENTER | Age: 37
End: 2022-11-18

## 2022-11-18 DIAGNOSIS — R06.83 SNORING: Primary | ICD-10-CM

## 2022-11-18 NOTE — TELEPHONE ENCOUNTER
Patient's PSG interpreted, did not show evidence of LUIS M. Snoring noted, predominately in supine and REM sleep. Patient will be notified and positional therapy discussed, as well if offer to refer to ENT for further eval for snoring.     Salo Carreon, SYLVIA - CNP

## 2022-12-02 ENCOUNTER — HOSPITAL ENCOUNTER (OUTPATIENT)
Age: 37
Discharge: HOME | End: 2022-12-02
Payer: OTHER GOVERNMENT

## 2022-12-02 LAB
FOLLICLE STIMULATING HORMONE: 3.5 MIU/ML
LUTEINIZING HORMONE: 6.7 MIU/ML
TSH SERPL DL<=0.05 MIU/L-ACNC: 0.94 UIU/ML (ref 0.27–4.2)

## 2022-12-02 PROCEDURE — 36415 COLL VENOUS BLD VENIPUNCTURE: CPT

## 2022-12-02 PROCEDURE — 83002 ASSAY OF GONADOTROPIN (LH): CPT

## 2022-12-02 PROCEDURE — 84443 ASSAY THYROID STIM HORMONE: CPT

## 2022-12-02 PROCEDURE — 83001 ASSAY OF GONADOTROPIN (FSH): CPT

## 2022-12-06 LAB — PAP SMEAR, EXTERNAL: NORMAL

## 2023-01-20 ENCOUNTER — OFFICE VISIT (OUTPATIENT)
Dept: ENT CLINIC | Age: 38
End: 2023-01-20
Payer: OTHER GOVERNMENT

## 2023-01-20 VITALS
HEART RATE: 90 BPM | HEIGHT: 69 IN | DIASTOLIC BLOOD PRESSURE: 68 MMHG | BODY MASS INDEX: 33.95 KG/M2 | WEIGHT: 229.2 LBS | SYSTOLIC BLOOD PRESSURE: 124 MMHG

## 2023-01-20 DIAGNOSIS — H81.12 BENIGN PAROXYSMAL POSITIONAL VERTIGO OF LEFT EAR: ICD-10-CM

## 2023-01-20 DIAGNOSIS — J34.2 DEVIATED NASAL SEPTUM: ICD-10-CM

## 2023-01-20 DIAGNOSIS — R09.81 NASAL CONGESTION: ICD-10-CM

## 2023-01-20 DIAGNOSIS — R06.83 SNORING: Primary | ICD-10-CM

## 2023-01-20 PROCEDURE — G8484 FLU IMMUNIZE NO ADMIN: HCPCS | Performed by: OTOLARYNGOLOGY

## 2023-01-20 PROCEDURE — G8417 CALC BMI ABV UP PARAM F/U: HCPCS | Performed by: OTOLARYNGOLOGY

## 2023-01-20 PROCEDURE — 99204 OFFICE O/P NEW MOD 45 MIN: CPT | Performed by: OTOLARYNGOLOGY

## 2023-01-20 PROCEDURE — 1036F TOBACCO NON-USER: CPT | Performed by: OTOLARYNGOLOGY

## 2023-01-20 PROCEDURE — G8427 DOCREV CUR MEDS BY ELIG CLIN: HCPCS | Performed by: OTOLARYNGOLOGY

## 2023-01-20 ASSESSMENT — ENCOUNTER SYMPTOMS
SINUS PAIN: 0
EYE REDNESS: 0
VOMITING: 0
STRIDOR: 0
VOICE CHANGE: 0
TROUBLE SWALLOWING: 0
NAUSEA: 0
ALLERGIC/IMMUNOLOGIC NEGATIVE: 1
SHORTNESS OF BREATH: 0
EYE DISCHARGE: 0
COLOR CHANGE: 0
COUGH: 0

## 2023-01-20 NOTE — PATIENT INSTRUCTIONS
Due to the volume of surgeries at our practice, please allow the surgery scheduler up to 2 weeks to call you to schedule surgery. If it has not been 14 business days after your office visit where surgery was discussed, please wait the appropriate time frame prior to calling office. SURGERY:_____/_____/_____    Nothing to eat or drink after midnight the night before surgery unless surgery is at [facility] or otherwise instructed by the hospital.    DO NOT TAKE ANY ASPIRIN PRODUCTS 7 days prior to surgery. Tylenol only. No Advil, Motrin, Aleve, or Ibuprofen. IF YOU ARE ON BLOOD THINNERS (PLAVIX, COUMADIN, ELIQUIS ETC) THESE WILL ALSO NEED TO BE HELD. Any illegal drugs in your system (including Marijuana even if legally prescribed) will result in your surgery being cancelled. Please be sure to check with our office or the hospital on time frame for the drugs to be out of your system. SHOULD YOUR INSURANCE CHANGE AT ANY TIME YOU MUST CONTACT OUR OFFICE. FAILURE TO DO SO MAY RESULT IN YOUR SURGERY BEING RESCHEDULED OR YOU MAY BE CHARGED AS SELF-PAY. Due to the high demand for surgery at our practice, if you cancel or reschedule your surgery two (2) times we may not reschedule you. If you need FMLA or Short Term Disability paperwork completed for your surgery, please complete your portion, ensure your name and date of birth are on them and fax them to [phone] asap. Paperwork can take up to 2 weeks to be completed. If you have any questions or concerns regarding your surgery, please contact the Surgery SchedulerManuel Gutierrez at [phone] option 2. If you need medical clearance, you are responsible to contact your physician(s) to schedule an appointment for clearance. If clearance is not completed within 30 days of your surgery it may be cancelled. Our office will fax the appropriate forms that need to be completed to your physician(s).     The location of your surgery will call you the day prior to your surgery date to let you know what time you have to be there and any other details. (they usually don't call until late afternoon- early evening.)- IF YOU HAVE QUESTIONS REGARDING THE TIME OF YOUR SURGERY, PLEASE CALL THE FACILITY YOU ARE SCHEDULED AT. [address] [facility], [address] [facility] will call you a couple days prior to surgery and give you further instructions, if you have any questions, you can reach them at [phone] (per Pre-Admission testing, EKG is required for all patients age 53+, have a diagnosis of hypertension, diabetes, or on dialysis). [address] [facility] will call you a couple days prior to surgery and give you further instructions, if you have any questions, you can reach them at [phone] (per Pre-Admission testing, EKG is required for all patients age 53+, have a diagnosis of hypertension, diabetes, or on dialysis). [address] [facility] Claudia Schulz will call you a couple days prior to surgery and give you further instructions, if you have any questions, you can reach them at [phone] (per Pre-Admission testing, EKG is required for all patients age 53+, have a diagnosis of hypertension, diabetes, or on dialysis).

## 2023-01-20 NOTE — PROGRESS NOTES
[facility]  Dr. Jone Neal. LONNY Dawkins Ms.Ed. New Consult       Patient Name:  Daisy Cobb  :  1985     CHIEF C/O:    Chief Complaint   Patient presents with    New Patient     NP snoring patient has had a couple sleep studies in last 2 years patient followed up with sleep dr and referred to ENT patient feels that snoring is getting worse  patient has had dizziness past 2 months        HISTORY OBTAINED FROM:  patient    HISTORY OF PRESENT ILLNESS:       Kavita Bolaños is a 30y.o. year old female, here today for eval of her nose and vertigo. She notes nasal congestion and trouble breathing through her nose on the right. No history of trauma. Had sleep study without LUIS M. She is not sleeping well because of her obstruction. Also notes verigo- room spinning lasting 30 seconds when she rolls over or turns her head. Last several months. Ho of Chiari I malformation which she is observing. No HENT surgery. Ho asthma. Not a smoker.        Past Medical History:   Diagnosis Date    Arthritis     Asthma     not taking any medications    Lesion of mouth      Past Surgical History:   Procedure Laterality Date    CHOLECYSTECTOMY, LAPAROSCOPIC      COLONOSCOPY      LAPAROSCOPY      Ex-lap, abdominal, removed implanted IUD    LEEP  2022    Dr. Yuri Kahn    [facility] BREAST BIOPSY RIGHT Right 2021    [facility] BREAST BIOPSY RIGHT 2021 [facility]    OTHER SURGICAL HISTORY Right 2016    tonsil mass incision    TONSILLECTOMY  2004    UPPER GASTROINTESTINAL ENDOSCOPY      WISDOM TOOTH EXTRACTION         Current Outpatient Medications:     vitamin D (CHOLECALCIFEROL) 125 MCG (5000 UT) CAPS capsule, Take 5,000 Units by mouth daily, Disp: , Rfl:     NONFORMULARY, Take 1 tablet by mouth at bedtime Remalton sleep aid, Disp: , Rfl:     magnesium oxide (MAG-OX) 400 MG tablet, TAKE 1 TABLET BY MOUTH EVERY DAY, Disp: 30 tablet, Rfl: 5    VYVANSE 50 MG capsule, TAKE 1 CAPSULE BY MOUTH EVERY DAY FOR 30 DAYS, Disp: , Rfl:     vitamin B-2 (RIBOFLAVIN) 100 MG TABS tablet, TAKE 2 TABLETS BY MOUTH EVERY DAY, Disp: 60 tablet, Rfl: 5    fluticasone (FLONASE) 50 MCG/ACT nasal spray, PLACE 1 SPRAY INTO EACH NOSTRIL EVERY DAY, Disp: 16 each, Rfl: 5    metFORMIN (GLUCOPHAGE) 500 MG tablet, Take 1 tablet by mouth 3 times daily, Disp: , Rfl:     ibuprofen (ADVIL;MOTRIN) 800 MG tablet, Take 800 mg by mouth every 6 hours as needed for Pain, Disp: , Rfl:     amitriptyline (ELAVIL) 25 MG tablet, TAKE 1 TABLET ORALLY ONCE PER DAY FOR 30 DAYS AT BEDTIME (Patient not taking: Reported on 1/20/2023), Disp: , Rfl:   Bupropion, Seasonal, and Clindamycin  Social History     Tobacco Use    Smoking status: Never    Smokeless tobacco: Never   Vaping Use    Vaping Use: Never used   Substance Use Topics    Alcohol use: Yes     Alcohol/week: 0.0 standard drinks     Comment: socially    Drug use: No     Family History   Problem Relation Age of Onset    High Blood Pressure Mother     Breast Cancer Mother 35    Thyroid Cancer Mother 44    No Known Problems Father         Heart issues run in family    Cancer Maternal Aunt         skin    Pancreatic Cancer Maternal Grandfather 48    Breast Cancer Maternal Cousin 39    Breast Cancer Maternal Cousin 28        second cousin    No Known Problems Sister     No Known Problems Brother     No Known Problems Maternal Grandmother        Review of Systems   Constitutional:  Negative for chills, fatigue and fever. HENT:  Positive for congestion. Negative for postnasal drip, sinus pain, trouble swallowing and voice change. Eyes:  Negative for discharge and redness. Respiratory:  Negative for cough, shortness of breath and stridor. Gastrointestinal:  Negative for nausea and vomiting. Endocrine: Negative. Genitourinary: Negative. Musculoskeletal: Negative. Skin:  Negative for color change and rash. Allergic/Immunologic: Negative. Neurological:  Positive for dizziness.  Negative for speech difficulty and headaches. Hematological: Negative. Psychiatric/Behavioral:  Negative for agitation and confusion. All other systems reviewed and are negative. /68 (Site: Left Upper Arm, Position: Sitting, Cuff Size: Medium Adult)   Pulse 90   Ht 5' 9\" (1.753 m)   Wt 229 lb 3.2 oz (104 kg)   BMI 33.85 kg/m²   Physical Exam  Constitutional:       Appearance: Normal appearance. HENT:      Head: Normocephalic and atraumatic. Right Ear: Tympanic membrane, ear canal and external ear normal.      Left Ear: Tympanic membrane, ear canal and external ear normal.      Nose: Congestion present. Mouth/Throat:      Mouth: Mucous membranes are moist.   Eyes:      Pupils: Pupils are equal, round, and reactive to light. Cardiovascular:      Rate and Rhythm: Normal rate. Pulmonary:      Effort: Pulmonary effort is normal.   Skin:     General: Skin is warm and dry. Neurological:      General: No focal deficit present. Mental Status: She is alert and oriented to person, place, and time. Geno Carey + on the left   Epley performed     IMPRESSION/PLAN:  Nasal congestion- deviated nasal septum to the right   BPPV, left Epley done in office, recommend home exercises   I recommend:    Septoplasty, submucous resection of inferior turbinates. The procedure risks and benefits were discussed with the patient and include continued congestion and headache and epistaxis. Pt and family understood and decided to proceed with the surgery. Risks:  -- Recurrence of the septal deviation with recurrence of the airway obstruction. The nasal septum is made of cartilage. Cartilage has a memory and sometimes over the course of hours will bend back to the preoperative position. -- Septal Hematoma: This can occur if a drainage hole was not created in the septum. (Note: most surgeons have the problem of too many holes created during surgery, not too few).  The cartilage has no blood supply and receives its nutrients from the overlying mucosa. A septal hematoma elevates the mucosal flaps off of the cartilage, resulting in cartilage death and usually infection. If the entire septum is lost the nose may collapse creating a saddle deformity. -- Hole in the nasal septum. This occurs where there are two opposing holes in the nasal septal flaps. A hole in the nasal septum may result in disturbing crusting, bleeding and whistling.   -- Saddle Nose: If too much supporting cartilage is removed, the mid-portion of the nose may sag creating a \"Saddle Nose\" deformity. Dr. Laddie Delay D. Bunevich D.O. Ms. Darylene Righter.  Otolaryngology Facial Plastic Surgery  :[facility]astic Surgery [facility]  Associate Clinical Professor:  Branden [facility]  1985      I have discussed the case, including pertinent history and exam findings with the resident. I have seen and examined the patient and the key elements of the encounter have been performed by me. I agree with the assessment, plan and orders as documented by the resident. Patient here for follow up of medical problems. Remainder of medical problems as per resident note.       [address] [facility], DO  1/23/23

## 2023-01-23 ENCOUNTER — OFFICE VISIT (OUTPATIENT)
Dept: FAMILY MEDICINE CLINIC | Age: 38
End: 2023-01-23
Payer: OTHER GOVERNMENT

## 2023-01-23 VITALS
BODY MASS INDEX: 34.36 KG/M2 | SYSTOLIC BLOOD PRESSURE: 118 MMHG | HEART RATE: 88 BPM | OXYGEN SATURATION: 98 % | RESPIRATION RATE: 18 BRPM | TEMPERATURE: 97.8 F | DIASTOLIC BLOOD PRESSURE: 74 MMHG | WEIGHT: 232 LBS | HEIGHT: 69 IN

## 2023-01-23 DIAGNOSIS — H81.13 BENIGN PAROXYSMAL POSITIONAL VERTIGO DUE TO BILATERAL VESTIBULAR DISORDER: Primary | ICD-10-CM

## 2023-01-23 DIAGNOSIS — J31.0 CHRONIC RHINITIS: ICD-10-CM

## 2023-01-23 DIAGNOSIS — R06.83 SNORING: ICD-10-CM

## 2023-01-23 PROCEDURE — 99213 OFFICE O/P EST LOW 20 MIN: CPT | Performed by: FAMILY MEDICINE

## 2023-01-23 PROCEDURE — 1036F TOBACCO NON-USER: CPT | Performed by: FAMILY MEDICINE

## 2023-01-23 PROCEDURE — G8427 DOCREV CUR MEDS BY ELIG CLIN: HCPCS | Performed by: FAMILY MEDICINE

## 2023-01-23 PROCEDURE — G8484 FLU IMMUNIZE NO ADMIN: HCPCS | Performed by: FAMILY MEDICINE

## 2023-01-23 PROCEDURE — G8417 CALC BMI ABV UP PARAM F/U: HCPCS | Performed by: FAMILY MEDICINE

## 2023-01-23 RX ORDER — RAMELTEON 8 MG/1
TABLET ORAL
COMMUNITY
Start: 2022-11-10

## 2023-01-23 RX ORDER — ALBUTEROL SULFATE 90 UG/1
AEROSOL, METERED RESPIRATORY (INHALATION)
COMMUNITY
Start: 2022-11-30

## 2023-01-23 ASSESSMENT — PATIENT HEALTH QUESTIONNAIRE - PHQ9
7. TROUBLE CONCENTRATING ON THINGS, SUCH AS READING THE NEWSPAPER OR WATCHING TELEVISION: 0
1. LITTLE INTEREST OR PLEASURE IN DOING THINGS: 0
5. POOR APPETITE OR OVEREATING: 1
4. FEELING TIRED OR HAVING LITTLE ENERGY: 0
3. TROUBLE FALLING OR STAYING ASLEEP: 1
SUM OF ALL RESPONSES TO PHQ QUESTIONS 1-9: 2
10. IF YOU CHECKED OFF ANY PROBLEMS, HOW DIFFICULT HAVE THESE PROBLEMS MADE IT FOR YOU TO DO YOUR WORK, TAKE CARE OF THINGS AT HOME, OR GET ALONG WITH OTHER PEOPLE: 0
SUM OF ALL RESPONSES TO PHQ QUESTIONS 1-9: 2
8. MOVING OR SPEAKING SO SLOWLY THAT OTHER PEOPLE COULD HAVE NOTICED. OR THE OPPOSITE, BEING SO FIGETY OR RESTLESS THAT YOU HAVE BEEN MOVING AROUND A LOT MORE THAN USUAL: 0
SUM OF ALL RESPONSES TO PHQ9 QUESTIONS 1 & 2: 0
9. THOUGHTS THAT YOU WOULD BE BETTER OFF DEAD, OR OF HURTING YOURSELF: 0
2. FEELING DOWN, DEPRESSED OR HOPELESS: 0
6. FEELING BAD ABOUT YOURSELF - OR THAT YOU ARE A FAILURE OR HAVE LET YOURSELF OR YOUR FAMILY DOWN: 0
SUM OF ALL RESPONSES TO PHQ QUESTIONS 1-9: 2
SUM OF ALL RESPONSES TO PHQ QUESTIONS 1-9: 2

## 2023-01-23 NOTE — PROGRESS NOTES
Subjective:  30 y.o. y/o female is here with complaints of dizziness. ENT: Dr. Olivia Lawrence, 1/20/23 OV reviewed  Charlotte-Hallpike done at visit, +both sides  Vertigo couple months, spinning, position change, getting out of bed, bending over to put leash on bed  Off/on tinnitus, longer term  +decrease in hearing  +snoring and chronic congestion, +deviated septum  +septoplasty with submucous resection of inferior turbinates recommended  No new medications  Concerned this isn't better      Patient's past medical, surgical, social and/or family history reviewed, updated in chart, and are non-contributory (unless otherwise stated). Medications and allergies also reviewed and updated in chart. Review of Systems:  Constitutional:  No fever, +fatigue, no chills, no weight change  Dermatology:  No rash, no mole, no dry or sensitive skin  ENT:  No cough, no sore throat, no sinus pain, no runny nose, no ear pain, +congestion  Cardiology:  No chest pain, no palpitations, no leg edema, no shortness of breath, no PND  Gastroenterology:  No dysphagia, no abdominal pain, no nausea, no vomiting, no constipation, no diarrhea, no heartburn  Musculoskeletal:  No joint pain, no leg cramps, + back pain, no muscle aches  Neuro:  + dizziness, no numbness/tingling, no weakness, no seizures  Respiratory:  No shortness of breath, no orthopnea, no wheezing, no MARKS  Urology:  No blood in the urine, no urinary frequency, no urinary incontinence, no urinary urgency, no nocturia, no dysuria    Vitals:    01/23/23 0919   BP: 118/74   Pulse: 88   Resp: 18   Temp: 97.8 °F (36.6 °C)   TempSrc: Temporal   SpO2: 98%   Weight: 232 lb (105.2 kg)   Height: 5' 9\" (1.753 m)     Body mass index is 34.26 kg/m².     General:  Patient alert and oriented x 3, NAD, pleasant  HEENT:  Atraumatic, normocephalic, no tenderness, pupils equal, EOMI, clear conjunctiva, TMs clear, +hearing grossly intact  Neck:  Supple, no goiter, no carotid bruits, no LAD  Lungs:  CTA B, symmetric breath sounds, no resp distress  Heart:  RRR, no murmurs, gallops or rubs  Neuro:  No focal neurologic deficits, strength upper and lower extremities 5/5 and equal, DTR 2+ b/l (did NOT do Rockwell-Hallpike since just done at ENT and positive)  Extremities:  No clubbing, cyanosis or edema  Skin: unremarkable    Assessment/Plan:      Sara Ferguson was seen today for dizziness. Diagnoses and all orders for this visit:    Benign paroxysmal positional vertigo due to bilateral vestibular disorder  -     [facility], Jyoti Romo  + Stretches and exercises given  + Discussed further evaluation if no improvement--known +Chiari malformation type I    Chronic rhinitis  Snoring  Plan for septoplasty and turbinate surgery with ENT    Staff to attain most recent pap smear from Dr. Verónica Andrade (LEEP done 7/22). As above. Call or go to ED immediately if symptoms worsen or persist.  Return in about 3 weeks (around 2/13/2023). As already scheduled, or sooner if necessary. Educational materials and/or home exercises printed for patient's review and were included in patient instructions on his/her After Visit Summary and given to patient at the end of visit. Counseled regarding above diagnosis, including possible risks and complications,  especially if left uncontrolled. Counseled regarding the possible side effects, risks, benefits and alternatives to treatment; patient and/or guardian verbalizes understanding, agrees, feels comfortable with and wishes to proceed with above treatment plan. Advised patient to call with any new medication issues, and read all Rx info from pharmacy to assure aware of all possible risks and side effects of medication before taking. Patient and/or guardian verbalizes understanding and agrees with above counseling, assessment and plan. All questions answered.

## 2023-01-26 ENCOUNTER — TELEPHONE (OUTPATIENT)
Dept: ENT CLINIC | Age: 38
End: 2023-01-26

## 2023-01-26 NOTE — TELEPHONE ENCOUNTER
Patient Called and scheduled sx   for 3/14/23 @[facility]. Restrictions and information has been reviewed with patient;  Patient expressed understanding and all questions answered.

## 2023-02-09 ENCOUNTER — TELEPHONE (OUTPATIENT)
Dept: ENT CLINIC | Age: 38
End: 2023-02-09

## 2023-02-09 PROBLEM — J34.2 DNS (DEVIATED NASAL SEPTUM): Status: ACTIVE | Noted: 2023-02-09

## 2023-02-09 NOTE — TELEPHONE ENCOUNTER
Prior Authorization Form:      DEMOGRAPHICS:                     Patient Name:  Jazmyne Ramirze  Patient :  1985            Insurance:  Payor: Faraz Pickard / Plan: GEHA ASA [address] / Product Type: *No Product type* /   Insurance ID Number:    Payer/Plan Subscr  Sex Relation Sub. Ins. ID Effective Group Num   1. [facility] 1985 Female Self [id number]                                   [address] [address]   2.  Edgardo Feeling* RASHEED MUNOZ 1985 Female Self [id number]                                    [address]         DIAGNOSIS & PROCEDURE:                       Procedure/Operation: SEPTOPLASTY; SUBMUCOSAL RESECTION OF INFERIOR NASAL TUBRINATES           CPT Code: 38994 [id number]    Diagnosis:  DEVIATED NASAL SEPTUM    ICD10 Code: J34.2    Location:  [facility]    Surgeon:  Edgar Rosas INFORMATION:                          Date: 3/14/23    Time: N/A              Anesthesia:  General                                                       Status:  Outpatient        Special Comments:  N/A       Electronically signed by Swati Del Valle MA on 2023 at 8:00 AM

## 2023-02-13 ENCOUNTER — OFFICE VISIT (OUTPATIENT)
Dept: FAMILY MEDICINE CLINIC | Age: 38
End: 2023-02-13
Payer: OTHER GOVERNMENT

## 2023-02-13 VITALS
DIASTOLIC BLOOD PRESSURE: 86 MMHG | RESPIRATION RATE: 16 BRPM | HEIGHT: 69 IN | BODY MASS INDEX: 34.21 KG/M2 | SYSTOLIC BLOOD PRESSURE: 136 MMHG | WEIGHT: 231 LBS | TEMPERATURE: 97.8 F | OXYGEN SATURATION: 98 % | HEART RATE: 95 BPM

## 2023-02-13 DIAGNOSIS — F43.10 PTSD (POST-TRAUMATIC STRESS DISORDER): ICD-10-CM

## 2023-02-13 DIAGNOSIS — Z86.19 H/O HERPES ZOSTER: ICD-10-CM

## 2023-02-13 DIAGNOSIS — R51.9 DAILY HEADACHE: ICD-10-CM

## 2023-02-13 DIAGNOSIS — J31.0 CHRONIC RHINITIS: ICD-10-CM

## 2023-02-13 DIAGNOSIS — F90.0 ATTENTION DEFICIT HYPERACTIVITY DISORDER (ADHD), PREDOMINANTLY INATTENTIVE TYPE: ICD-10-CM

## 2023-02-13 DIAGNOSIS — E66.09 CLASS 1 OBESITY DUE TO EXCESS CALORIES WITHOUT SERIOUS COMORBIDITY WITH BODY MASS INDEX (BMI) OF 34.0 TO 34.9 IN ADULT: Primary | ICD-10-CM

## 2023-02-13 DIAGNOSIS — H81.13 BENIGN PAROXYSMAL POSITIONAL VERTIGO DUE TO BILATERAL VESTIBULAR DISORDER: ICD-10-CM

## 2023-02-13 DIAGNOSIS — R06.83 SNORING: ICD-10-CM

## 2023-02-13 DIAGNOSIS — F33.42 RECURRENT MAJOR DEPRESSIVE DISORDER, IN FULL REMISSION (HCC): ICD-10-CM

## 2023-02-13 DIAGNOSIS — G93.5 CHIARI MALFORMATION TYPE I (HCC): ICD-10-CM

## 2023-02-13 PROCEDURE — 1036F TOBACCO NON-USER: CPT | Performed by: FAMILY MEDICINE

## 2023-02-13 PROCEDURE — 99214 OFFICE O/P EST MOD 30 MIN: CPT | Performed by: FAMILY MEDICINE

## 2023-02-13 PROCEDURE — G8427 DOCREV CUR MEDS BY ELIG CLIN: HCPCS | Performed by: FAMILY MEDICINE

## 2023-02-13 PROCEDURE — G8417 CALC BMI ABV UP PARAM F/U: HCPCS | Performed by: FAMILY MEDICINE

## 2023-02-13 PROCEDURE — G8484 FLU IMMUNIZE NO ADMIN: HCPCS | Performed by: FAMILY MEDICINE

## 2023-02-13 NOTE — PATIENT INSTRUCTIONS
Let me know after your 4th dose how you are doing. If you're tolerating it well, then I will send in a new script at an increased dose.

## 2023-02-13 NOTE — PROGRESS NOTES
Subjective:  30 y.o. y/o female is here for f/u chronic issues including obesity.      ENT: Dr. Calista Polo, 1/20/23 OV   +snoring and chronic congestion, +deviated septum  +septoplasty with submucous resection of inferior turbinates recommended, scheduled 3/14/23  +BPPV  1st PT appt still this week for BPPV  Careful to move head suddenly, doing OK    Shingles now 4 times, treated with valtrex at [facility]  +immuno-competent  Asking about Shingrix     Obesity  Cut back on calories  Limiting fast foods  Choosing fish  Walking at work  Continues to gain  Decreased late night eating  Food portions and cravings are not an issue    Had to cancel with [facility] neurologist who specializes in headaches; been busy and not tried to reschedule  Did see Dr. Eliana Koo, Chiari malformation type I, 4/27/21 OV, recommending Chiari decompression if no improvement after working with neurologist  Taking motrin  Always with headaches, no changes  Drinking plenty of water    Psych: Comprehensive, +depression and PTSD, +ADHD, +Vyvanse only now, doing OK, always fatigued   Acne resolves with metformin    Sleep medicine: 6/22 OV, +chronic sleep initiation/maintenance insomnia and suspected sleep apnea, +home sleep study did not show LUIS M, but due to sensitivity and decreased sensitivity of home study +plan for In-lab sleep study 10/22--showed significant snoring, +sent to ENT    Ob/gyn: +[facility]OS, Dr. Matt Darby, ibuprofen 800mg for cramping, +metformin 500mg BID (tolerating OK but loose stools)  Tried OCP, didn't tolerate well, affected mood  LEEP with conazation 7/22, HGSIL, +bactrim      Review of Systems:  Constitutional:  No fever, + fatigue, no chills, + headaches, no weight change  Dermatology:  + rash, no mole, + dry or sensitive skin  ENT:  No cough, no sore throat, no sinus pain, no runny nose, no ear pain, +chronic congestion (controlled with flonase)  Cardiology:  No chest pain, +occ palpitations, no leg edema, no shortness of breath, no PND  Gastroenterology:  No dysphagia, no abdominal pain, no nausea, no vomiting, no constipation, no diarrhea, no heartburn  Musculoskeletal:  No joint pain, no leg cramps, + back pain, no muscle aches  Neuro:  + dizziness, no numbness/tingling  Respiratory:  No shortness of breath, no orthopnea, no wheezing, no MARKS  Urology:  No blood in the urine, + urinary frequency, no urinary incontinence, no urinary urgency, no nocturia, no dysuria    Patient's past medical, surgical, social and/or family history reviewed, updated in chart, and are non-contributory (unless otherwise stated). Medications and allergies also reviewed and updated in chart. Vitals:    02/13/23 1549   BP: 136/86   Pulse: 95   Resp: 16   Temp: 97.8 °F (36.6 °C)   TempSrc: Temporal   SpO2: 98%   Weight: 231 lb (104.8 kg)   Height: 5' 9\" (1.753 m)     Body mass index is 34.11 kg/m². General:  Patient alert and oriented x 3, NAD, pleasant  HEENT:  Atraumatic, normocephalic, no tenderness, pupils equal, EOMI, clear conjunctiva  Neck:  Supple, no goiter, no carotid bruits, no LAD  Heart: RRR, no m/r/g  Lungs:  CTAB, no resp distress  Extremities:  No clubbing, cyanosis or edema  Skin: unremarkable      Assessment/Plan:    Daysi Earl was seen today for sinusitis, weight gain and dizziness. Diagnoses and all orders for this visit:    Class 1 obesity due to excess calories without serious comorbidity with body mass index (BMI) of 34.0 to 34.9 in adult, uncontrolled  -     Semaglutide-Weight Management (WEGOVY) 0.25 MG/0.5ML SOAJ SC injection;  Inject 0.25 mg into the skin every 7 days  -     Tracy Jack MD, Bariatrics, [facility]  + Doing well with diet and exercise  + Does not need appetite suppressant  + Trial of semaglutide starting at 0.25mg and increasing dose every 4 weeks if doing well  + Referral also to bariatric clinic for more guidance    Benign paroxysmal positional vertigo due to bilateral vestibular disorder, persistent  PT appt this week  Movement modification    Snoring  Chronic rhinitis  Plan for septoplasty and turbinate resection 3/14/23    Daily headache  Chiari malformation type I ([facility] [address]  Stable for now, pursue appt with [facility] neurology as planned once time available in schedule    Attention deficit hyperactivity disorder (ADHD), predominantly inattentive type  PTSD (post-traumatic stress disorder)  Recurrent major depressive disorder, in full remission ([address]  Doing well just on Vyvanse right now    H/O herpes zoster  4th bout--did not see most recent rash  Question if Shingrix would be helpful  May send to ID for further evaluation and recommendation      As above. Call or go to ED immediately if symptoms worsen or persist.  Return in about 8 weeks (around 4/10/2023) for Obesity. , or sooner if necessary. Counseled regarding above diagnosis, including possible risks and complications,  especially if left uncontrolled. Counseled regarding the possible side effects, risks, benefits and alternatives to treatment; patient and/or guardian verbalizes understanding, agrees, feels comfortable with and wishes to proceed with above treatment plan. Advised patient to call with any new medication issues, and read all Rx info from pharmacy to assure aware of all possible risks and side effects of medication before taking. Patient and/or guardian verbalizes understanding and agrees with above counseling, assessment and plan. All questions answered.

## 2023-03-01 ENCOUNTER — TELEPHONE (OUTPATIENT)
Dept: BARIATRICS/WEIGHT MGMT | Age: 38
End: 2023-03-01

## 2023-03-01 NOTE — TELEPHONE ENCOUNTER
I called and lm with pt, pt does not have a billable dx to be seen, self pay option was offered, pt to call if they wish to make appt.

## 2023-03-11 DIAGNOSIS — E66.09 CLASS 1 OBESITY DUE TO EXCESS CALORIES WITHOUT SERIOUS COMORBIDITY WITH BODY MASS INDEX (BMI) OF 34.0 TO 34.9 IN ADULT: ICD-10-CM

## 2023-03-13 ENCOUNTER — ANESTHESIA EVENT (OUTPATIENT)
Dept: OPERATING ROOM | Age: 38
End: 2023-03-13
Payer: OTHER GOVERNMENT

## 2023-03-13 NOTE — H&P
[facility]  Dr. Anthony Headley. LONNY Dawkins Ms.Ed. New Consult         Patient Name:  Malia Albright  :  1985      CHIEF C/O:         Chief Complaint   Patient presents with    New Patient       NP snoring patient has had a couple sleep studies in last 2 years patient followed up with sleep dr and referred to ENT patient feels that snoring is getting worse  patient has had dizziness past 2 months          HISTORY OBTAINED FROM:  patient     HISTORY OF PRESENT ILLNESS:       Rai is a 30y.o. year old female, here today for eval of her nose and vertigo. She notes nasal congestion and trouble breathing through her nose on the right. No history of trauma. Had sleep study without LUIS M. She is not sleeping well because of her obstruction. Also notes verigo- room spinning lasting 30 seconds when she rolls over or turns her head. Last several months. Ho of Chiari I malformation which she is observing. No HENT surgery. Ho asthma. Not a smoker.          Past Medical History        Past Medical History:   Diagnosis Date    Arthritis      Asthma       not taking any medications    Lesion of mouth           Past Surgical History         Past Surgical History:   Procedure Laterality Date    CHOLECYSTECTOMY, LAPAROSCOPIC       COLONOSCOPY        LAPAROSCOPY         Ex-lap, abdominal, removed implanted IUD    LEEP   2022     Dr. Ernesto NIETO STEROTACTIC LOC BREAST BIOPSY RIGHT Right 2021     GERSON STEROTACTIC LOC BREAST BIOPSY RIGHT 2021 [facility]    OTHER SURGICAL HISTORY Right 2016     tonsil mass incision    TONSILLECTOMY   2004    UPPER GASTROINTESTINAL ENDOSCOPY        WISDOM TOOTH EXTRACTION               Current Medication      Current Outpatient Medications:     vitamin D (CHOLECALCIFEROL) 125 MCG (5000 UT) CAPS capsule, Take 5,000 Units by mouth daily, Disp: , Rfl:     NONFORMULARY, Take 1 tablet by mouth at bedtime Remalton sleep aid, Disp: , Rfl:     magnesium oxide (MAG-OX) 400 MG tablet, TAKE 1 TABLET BY MOUTH EVERY DAY, Disp: 30 tablet, Rfl: 5    VYVANSE 50 MG capsule, TAKE 1 CAPSULE BY MOUTH EVERY DAY FOR 30 DAYS, Disp: , Rfl:     vitamin B-2 (RIBOFLAVIN) 100 MG TABS tablet, TAKE 2 TABLETS BY MOUTH EVERY DAY, Disp: 60 tablet, Rfl: 5    fluticasone (FLONASE) 50 MCG/ACT nasal spray, PLACE 1 SPRAY INTO EACH NOSTRIL EVERY DAY, Disp: 16 each, Rfl: 5    metFORMIN (GLUCOPHAGE) 500 MG tablet, Take 1 tablet by mouth 3 times daily, Disp: , Rfl:     ibuprofen (ADVIL;MOTRIN) 800 MG tablet, Take 800 mg by mouth every 6 hours as needed for Pain, Disp: , Rfl:     amitriptyline (ELAVIL) 25 MG tablet, TAKE 1 TABLET ORALLY ONCE PER DAY FOR 30 DAYS AT BEDTIME (Patient not taking: Reported on 1/20/2023), Disp: , Rfl:      Bupropion, Seasonal, and Clindamycin  Social History            Tobacco Use    Smoking status: Never    Smokeless tobacco: Never   Vaping Use    Vaping Use: Never used   Substance Use Topics    Alcohol use: Yes       Alcohol/week: 0.0 standard drinks       Comment: socially    Drug use: No      Family History         Family History   Problem Relation Age of Onset    High Blood Pressure Mother      Breast Cancer Mother 35    Thyroid Cancer Mother 44    No Known Problems Father           Heart issues run in family    Cancer Maternal Aunt           skin    Pancreatic Cancer Maternal Grandfather 48    Breast Cancer Maternal Cousin 39    Breast Cancer Maternal Cousin 28         second cousin    No Known Problems Sister      No Known Problems Brother      No Known Problems Maternal Grandmother              Review of Systems   Constitutional:  Negative for chills, fatigue and fever. HENT:  Positive for congestion. Negative for postnasal drip, sinus pain, trouble swallowing and voice change. Eyes:  Negative for discharge and redness. Respiratory:  Negative for cough, shortness of breath and stridor. Gastrointestinal:  Negative for nausea and vomiting. Endocrine: Negative. Genitourinary: Negative. Musculoskeletal: Negative. Skin:  Negative for color change and rash. Allergic/Immunologic: Negative. Neurological:  Positive for dizziness. Negative for speech difficulty and headaches. Hematological: Negative. Psychiatric/Behavioral:  Negative for agitation and confusion. All other systems reviewed and are negative. /68 (Site: Left Upper Arm, Position: Sitting, Cuff Size: Medium Adult)   Pulse 90   Ht 5' 9\" (1.753 m)   Wt 229 lb 3.2 oz (104 kg)   BMI 33.85 kg/m²   Physical Exam  Constitutional:       Appearance: Normal appearance. HENT:      Head: Normocephalic and atraumatic. Right Ear: Tympanic membrane, ear canal and external ear normal.      Left Ear: Tympanic membrane, ear canal and external ear normal.      Nose: Congestion present. Mouth/Throat:      Mouth: Mucous membranes are moist.   Eyes:      Pupils: Pupils are equal, round, and reactive to light. Cardiovascular:      Rate and Rhythm: Normal rate. Pulmonary:      Effort: Pulmonary effort is normal.   Skin:     General: Skin is warm and dry. Neurological:      General: No focal deficit present. Mental Status: She is alert and oriented to person, place, and time. Radha Ly + on the left   Epley performed      IMPRESSION/PLAN:  Nasal congestion- deviated nasal septum to the right   BPPV, left Epley done in office, recommend home exercises   I recommend:     Septoplasty, submucous resection of inferior turbinates. The procedure risks and benefits were discussed with the patient and include continued congestion and headache and epistaxis. Pt and family understood and decided to proceed with the surgery. Risks:  -- Recurrence of the septal deviation with recurrence of the airway obstruction. The nasal septum is made of cartilage. Cartilage has a memory and sometimes over the course of hours will bend back to the preoperative position.   -- Septal Hematoma: This can occur if a drainage hole was not created in the septum. (Note: most surgeons have the problem of too many holes created during surgery, not too few). The cartilage has no blood supply and receives its nutrients from the overlying mucosa. A septal hematoma elevates the mucosal flaps off of the cartilage, resulting in cartilage death and usually infection. If the entire septum is lost the nose may collapse creating a saddle deformity. -- Hole in the nasal septum. This occurs where there are two opposing holes in the nasal septal flaps. A hole in the nasal septum may result in disturbing crusting, bleeding and whistling.   -- Saddle Nose: If too much supporting cartilage is removed, the mid-portion of the nose may sag creating a \"Saddle Nose\" deformity. Dr. Lily Rasheed.  Otolaryngology Facial Plastic Surgery  :[facility]ol[facility]/Facial Plastic Surgery [facility]  Associate Clinical Professor:  Stephon Fleming, [facility]                    Alexa Pacheco  1985        I have discussed the case, including pertinent history and exam findings with the resident. I have seen and examined the patient and the key elements of the encounter have been performed by me. I agree with the assessment, plan and orders as documented by the resident. Patient here for follow up of medical problems.

## 2023-03-13 NOTE — PROGRESS NOTES
[facility] PRE-ADMISSION TESTING INSTRUCTIONS    The Preadmission Testing patient is instructed accordingly using the following criteria (check applicable):    ARRIVAL INSTRUCTIONS:  [x] Parking the day of Surgery is located in the Main Entrance lot. Upon entering the door, make an immediate right to the surgery reception desk    [x] Bring photo ID and insurance card    [x] Bring in a copy of Living will or Durable Power of  papers. [x] Please be sure to arrange for responsible adult to provide transportation to and from the hospital    [x] Please arrange for responsible adult to be with you for the 24 hour period post procedure due to having anesthesia    [x] If you awake am of surgery not feeling well or have temperature >100 please call [phone]    GENERAL INSTRUCTIONS:    [x] Nothing by mouth after midnight, including gum, candy, mints or water    [x] You may brush your teeth, but do not swallow any water    [x] Take medications as instructed with 1-2 oz of water    [x] Stop herbal supplements and vitamins 5 days prior to procedure    [] Follow preop dosing of blood thinners per physician instructions    [] Take 1/2 dose of evening insulin, but no insulin after midnight    [] No oral diabetic medications after midnight    [] If diabetic and have low blood sugar or feel symptomatic, take 1-2oz apple juice only    [x] Bring inhalers day of surgery    [] Bring C-PAP/ Bi-Pap day of surgery    [x] Bring urine specimen day of surgery    [x] Shower or bath with soap, lather and rinse well, AM of Surgery, no lotion, powders or creams to surgical site    [] Follow bowel prep as instructed per surgeon    [x] No tobacco products within 24 hours of surgery     [x] No alcohol or illegal drug use within 24 hours of surgery.     [x] Jewelry, body piercing's, eyeglasses, contact lenses and dentures are not permitted into surgery (bring cases)      [x] Please do not wear any nail polish, make up or hair products on the day of surgery    [x] You can expect a call the business day prior to procedure to notify you if your arrival time changes    [x] If you receive a survey after surgery we would greatly appreciate your comments    [] Parent/guardian of a minor must accompany their child and remain on the premises  the entire time they are under our care     [] Pediatric patients may bring favorite toy, blanket or comfort item with them    [] A caregiver or family member must remain with the patient during their stay if they are mentally handicapped, have dementia, disoriented or unable to use a call light or would be a safety concern if left unattended    [x] Please notify surgeon if you develop any illness between now and time of surgery (cold, cough, sore throat, fever, nausea, vomiting) or any signs of infections  including skin, wounds, and dental.    [x]  The Outpatient Pharmacy is available to fill your prescription here on your day of surgery, ask your preop nurse for details    [] Other instructions    EDUCATIONAL MATERIALS PROVIDED:    [] PAT Preoperative Education Packet/Booklet     [] Medication List    [] Transfusion bracelet applied with instructions    [] Shower with soap, lather and rinse well, and use CHG wipes provided the evening before surgery as instructed    [] Incentive spirometer with instructions

## 2023-03-14 ENCOUNTER — ANESTHESIA (OUTPATIENT)
Dept: OPERATING ROOM | Age: 38
End: 2023-03-14
Payer: OTHER GOVERNMENT

## 2023-03-14 ENCOUNTER — HOSPITAL ENCOUNTER (OUTPATIENT)
Age: 38
Setting detail: OUTPATIENT SURGERY
Discharge: HOME | End: 2023-03-14
Attending: OTOLARYNGOLOGY | Admitting: OTOLARYNGOLOGY
Payer: OTHER GOVERNMENT

## 2023-03-14 VITALS
HEART RATE: 72 BPM | BODY MASS INDEX: 34.07 KG/M2 | SYSTOLIC BLOOD PRESSURE: 131 MMHG | RESPIRATION RATE: 16 BRPM | OXYGEN SATURATION: 98 % | WEIGHT: 230 LBS | HEIGHT: 69 IN | DIASTOLIC BLOOD PRESSURE: 73 MMHG | TEMPERATURE: 97.6 F

## 2023-03-14 DIAGNOSIS — G89.18 POST-OP PAIN: Primary | ICD-10-CM

## 2023-03-14 DIAGNOSIS — J34.2 DNS (DEVIATED NASAL SEPTUM): ICD-10-CM

## 2023-03-14 LAB
HCG, URINE, POC: NEGATIVE
Lab: NORMAL
NEGATIVE QC PASS/FAIL: NORMAL
POSITIVE QC PASS/FAIL: NORMAL

## 2023-03-14 PROCEDURE — 2580000003 HC RX 258

## 2023-03-14 PROCEDURE — 2720000010 HC SURG SUPPLY STERILE: Performed by: OTOLARYNGOLOGY

## 2023-03-14 PROCEDURE — 6370000000 HC RX 637 (ALT 250 FOR IP): Performed by: ANESTHESIOLOGY

## 2023-03-14 PROCEDURE — 3600000013 HC SURGERY LEVEL 3 ADDTL 15MIN: Performed by: OTOLARYNGOLOGY

## 2023-03-14 PROCEDURE — 7100000001 HC PACU RECOVERY - ADDTL 15 MIN: Performed by: OTOLARYNGOLOGY

## 2023-03-14 PROCEDURE — 2580000003 HC RX 258: Performed by: STUDENT IN AN ORGANIZED HEALTH CARE EDUCATION/TRAINING PROGRAM

## 2023-03-14 PROCEDURE — 2500000003 HC RX 250 WO HCPCS: Performed by: OTOLARYNGOLOGY

## 2023-03-14 PROCEDURE — 2500000003 HC RX 250 WO HCPCS

## 2023-03-14 PROCEDURE — 6360000002 HC RX W HCPCS: Performed by: STUDENT IN AN ORGANIZED HEALTH CARE EDUCATION/TRAINING PROGRAM

## 2023-03-14 PROCEDURE — 6360000002 HC RX W HCPCS

## 2023-03-14 PROCEDURE — 7100000010 HC PHASE II RECOVERY - FIRST 15 MIN: Performed by: OTOLARYNGOLOGY

## 2023-03-14 PROCEDURE — 3600000003 HC SURGERY LEVEL 3 BASE: Performed by: OTOLARYNGOLOGY

## 2023-03-14 PROCEDURE — 6370000000 HC RX 637 (ALT 250 FOR IP): Performed by: OTOLARYNGOLOGY

## 2023-03-14 PROCEDURE — 7100000000 HC PACU RECOVERY - FIRST 15 MIN: Performed by: OTOLARYNGOLOGY

## 2023-03-14 PROCEDURE — 3700000000 HC ANESTHESIA ATTENDED CARE: Performed by: OTOLARYNGOLOGY

## 2023-03-14 PROCEDURE — 3700000001 HC ADD 15 MINUTES (ANESTHESIA): Performed by: OTOLARYNGOLOGY

## 2023-03-14 PROCEDURE — 88305 TISSUE EXAM BY PATHOLOGIST: CPT

## 2023-03-14 PROCEDURE — 2709999900 HC NON-CHARGEABLE SUPPLY: Performed by: OTOLARYNGOLOGY

## 2023-03-14 PROCEDURE — 7100000011 HC PHASE II RECOVERY - ADDTL 15 MIN: Performed by: OTOLARYNGOLOGY

## 2023-03-14 RX ORDER — LIDOCAINE HYDROCHLORIDE AND EPINEPHRINE 10; 10 MG/ML; UG/ML
INJECTION, SOLUTION INFILTRATION; PERINEURAL PRN
Status: DISCONTINUED | OUTPATIENT
Start: 2023-03-14 | End: 2023-03-14 | Stop reason: ALTCHOICE

## 2023-03-14 RX ORDER — MIDAZOLAM HYDROCHLORIDE 1 MG/ML
INJECTION INTRAMUSCULAR; INTRAVENOUS PRN
Status: DISCONTINUED | OUTPATIENT
Start: 2023-03-14 | End: 2023-03-14 | Stop reason: SDUPTHER

## 2023-03-14 RX ORDER — OXYMETAZOLINE HYDROCHLORIDE 0.05 G/100ML
SPRAY NASAL PRN
Status: DISCONTINUED | OUTPATIENT
Start: 2023-03-14 | End: 2023-03-14 | Stop reason: ALTCHOICE

## 2023-03-14 RX ORDER — ONDANSETRON 4 MG/1
4 TABLET, FILM COATED ORAL 3 TIMES DAILY PRN
Qty: 15 TABLET | Refills: 0 | Status: SHIPPED | OUTPATIENT
Start: 2023-03-14

## 2023-03-14 RX ORDER — LABETALOL HYDROCHLORIDE 5 MG/ML
10 INJECTION, SOLUTION INTRAVENOUS
Status: DISCONTINUED | OUTPATIENT
Start: 2023-03-14 | End: 2023-03-14 | Stop reason: HOSPADM

## 2023-03-14 RX ORDER — SODIUM CHLORIDE 0.9 % (FLUSH) 0.9 %
5-40 SYRINGE (ML) INJECTION EVERY 12 HOURS SCHEDULED
Status: DISCONTINUED | OUTPATIENT
Start: 2023-03-14 | End: 2023-03-14 | Stop reason: HOSPADM

## 2023-03-14 RX ORDER — SODIUM CHLORIDE 9 MG/ML
INJECTION, SOLUTION INTRAVENOUS PRN
Status: DISCONTINUED | OUTPATIENT
Start: 2023-03-14 | End: 2023-03-14 | Stop reason: HOSPADM

## 2023-03-14 RX ORDER — SODIUM CHLORIDE 9 MG/ML
25 INJECTION, SOLUTION INTRAVENOUS PRN
Status: DISCONTINUED | OUTPATIENT
Start: 2023-03-14 | End: 2023-03-14 | Stop reason: HOSPADM

## 2023-03-14 RX ORDER — HYDROCODONE BITARTRATE AND ACETAMINOPHEN 5; 325 MG/1; MG/1
1 TABLET ORAL ONCE
Status: COMPLETED | OUTPATIENT
Start: 2023-03-14 | End: 2023-03-14

## 2023-03-14 RX ORDER — PROPOFOL 10 MG/ML
INJECTION, EMULSION INTRAVENOUS PRN
Status: DISCONTINUED | OUTPATIENT
Start: 2023-03-14 | End: 2023-03-14 | Stop reason: SDUPTHER

## 2023-03-14 RX ORDER — BACITRACIN ZINC 500 [USP'U]/G
OINTMENT TOPICAL PRN
Status: DISCONTINUED | OUTPATIENT
Start: 2023-03-14 | End: 2023-03-14 | Stop reason: ALTCHOICE

## 2023-03-14 RX ORDER — HYDROCODONE BITARTRATE AND ACETAMINOPHEN 5; 325 MG/1; MG/1
1 TABLET ORAL EVERY 6 HOURS PRN
Qty: 10 TABLET | Refills: 0 | Status: SHIPPED | OUTPATIENT
Start: 2023-03-14 | End: 2023-03-17

## 2023-03-14 RX ORDER — NEOSTIGMINE METHYLSULFATE 1 MG/ML
INJECTION, SOLUTION INTRAVENOUS PRN
Status: DISCONTINUED | OUTPATIENT
Start: 2023-03-14 | End: 2023-03-14 | Stop reason: SDUPTHER

## 2023-03-14 RX ORDER — MEPERIDINE HYDROCHLORIDE 25 MG/ML
12.5 INJECTION INTRAMUSCULAR; INTRAVENOUS; SUBCUTANEOUS EVERY 5 MIN PRN
Status: DISCONTINUED | OUTPATIENT
Start: 2023-03-14 | End: 2023-03-14 | Stop reason: HOSPADM

## 2023-03-14 RX ORDER — GLYCOPYRROLATE 0.2 MG/ML
INJECTION INTRAMUSCULAR; INTRAVENOUS PRN
Status: DISCONTINUED | OUTPATIENT
Start: 2023-03-14 | End: 2023-03-14 | Stop reason: SDUPTHER

## 2023-03-14 RX ORDER — ONDANSETRON 2 MG/ML
INJECTION INTRAMUSCULAR; INTRAVENOUS PRN
Status: DISCONTINUED | OUTPATIENT
Start: 2023-03-14 | End: 2023-03-14 | Stop reason: SDUPTHER

## 2023-03-14 RX ORDER — SODIUM CHLORIDE, SODIUM LACTATE, POTASSIUM CHLORIDE, CALCIUM CHLORIDE 600; 310; 30; 20 MG/100ML; MG/100ML; MG/100ML; MG/100ML
INJECTION, SOLUTION INTRAVENOUS CONTINUOUS PRN
Status: DISCONTINUED | OUTPATIENT
Start: 2023-03-14 | End: 2023-03-14 | Stop reason: SDUPTHER

## 2023-03-14 RX ORDER — ONDANSETRON 2 MG/ML
4 INJECTION INTRAMUSCULAR; INTRAVENOUS
Status: DISCONTINUED | OUTPATIENT
Start: 2023-03-14 | End: 2023-03-14 | Stop reason: HOSPADM

## 2023-03-14 RX ORDER — SODIUM CHLORIDE 0.9 % (FLUSH) 0.9 %
5-40 SYRINGE (ML) INJECTION PRN
Status: DISCONTINUED | OUTPATIENT
Start: 2023-03-14 | End: 2023-03-14 | Stop reason: HOSPADM

## 2023-03-14 RX ORDER — ROCURONIUM BROMIDE 10 MG/ML
INJECTION, SOLUTION INTRAVENOUS PRN
Status: DISCONTINUED | OUTPATIENT
Start: 2023-03-14 | End: 2023-03-14 | Stop reason: SDUPTHER

## 2023-03-14 RX ORDER — HYDRALAZINE HYDROCHLORIDE 20 MG/ML
10 INJECTION INTRAMUSCULAR; INTRAVENOUS
Status: DISCONTINUED | OUTPATIENT
Start: 2023-03-14 | End: 2023-03-14 | Stop reason: HOSPADM

## 2023-03-14 RX ORDER — FENTANYL CITRATE 50 UG/ML
INJECTION, SOLUTION INTRAMUSCULAR; INTRAVENOUS PRN
Status: DISCONTINUED | OUTPATIENT
Start: 2023-03-14 | End: 2023-03-14 | Stop reason: SDUPTHER

## 2023-03-14 RX ORDER — IPRATROPIUM BROMIDE AND ALBUTEROL SULFATE 2.5; .5 MG/3ML; MG/3ML
1 SOLUTION RESPIRATORY (INHALATION)
Status: DISCONTINUED | OUTPATIENT
Start: 2023-03-14 | End: 2023-03-14 | Stop reason: HOSPADM

## 2023-03-14 RX ORDER — DEXAMETHASONE SODIUM PHOSPHATE 4 MG/ML
INJECTION, SOLUTION INTRA-ARTICULAR; INTRALESIONAL; INTRAMUSCULAR; INTRAVENOUS; SOFT TISSUE PRN
Status: DISCONTINUED | OUTPATIENT
Start: 2023-03-14 | End: 2023-03-14 | Stop reason: SDUPTHER

## 2023-03-14 RX ORDER — CEPHALEXIN 500 MG/1
500 CAPSULE ORAL 3 TIMES DAILY
Qty: 21 CAPSULE | Refills: 0 | Status: SHIPPED | OUTPATIENT
Start: 2023-03-14 | End: 2023-03-21

## 2023-03-14 RX ORDER — MIDAZOLAM HYDROCHLORIDE 2 MG/2ML
2 INJECTION, SOLUTION INTRAMUSCULAR; INTRAVENOUS
Status: DISCONTINUED | OUTPATIENT
Start: 2023-03-14 | End: 2023-03-14 | Stop reason: HOSPADM

## 2023-03-14 RX ORDER — HYDROMORPHONE HCL 110MG/55ML
PATIENT CONTROLLED ANALGESIA SYRINGE INTRAVENOUS PRN
Status: DISCONTINUED | OUTPATIENT
Start: 2023-03-14 | End: 2023-03-14 | Stop reason: SDUPTHER

## 2023-03-14 RX ADMIN — WATER 2000 MG: 1 INJECTION INTRAMUSCULAR; INTRAVENOUS; SUBCUTANEOUS at 12:05

## 2023-03-14 RX ADMIN — HYDROMORPHONE HYDROCHLORIDE 0.5 MG: 0.5 INJECTION, SOLUTION INTRAMUSCULAR; INTRAVENOUS; SUBCUTANEOUS at 13:50

## 2023-03-14 RX ADMIN — FENTANYL CITRATE 100 MCG: 50 INJECTION, SOLUTION INTRAMUSCULAR; INTRAVENOUS at 12:05

## 2023-03-14 RX ADMIN — MIDAZOLAM 2 MG: 1 INJECTION INTRAMUSCULAR; INTRAVENOUS at 12:05

## 2023-03-14 RX ADMIN — DEXAMETHASONE SODIUM PHOSPHATE 10 MG: 4 INJECTION, SOLUTION INTRAMUSCULAR; INTRAVENOUS at 12:23

## 2023-03-14 RX ADMIN — FENTANYL CITRATE 100 MCG: 50 INJECTION, SOLUTION INTRAMUSCULAR; INTRAVENOUS at 12:40

## 2023-03-14 RX ADMIN — HYDROMORPHONE HYDROCHLORIDE 2 MG: 2 INJECTION, SOLUTION INTRAMUSCULAR; INTRAVENOUS; SUBCUTANEOUS at 12:31

## 2023-03-14 RX ADMIN — HYDROCODONE BITARTRATE AND ACETAMINOPHEN 1 TABLET: 5; 325 TABLET ORAL at 14:30

## 2023-03-14 RX ADMIN — NEOSTIGMINE METHYLSULFATE 3 MG: 1 INJECTION, SOLUTION INTRAVENOUS at 13:00

## 2023-03-14 RX ADMIN — PROPOFOL 200 MG: 10 INJECTION, EMULSION INTRAVENOUS at 12:14

## 2023-03-14 RX ADMIN — SODIUM CHLORIDE, POTASSIUM CHLORIDE, SODIUM LACTATE AND CALCIUM CHLORIDE: 600; 310; 30; 20 INJECTION, SOLUTION INTRAVENOUS at 12:05

## 2023-03-14 RX ADMIN — Medication 0.5 MG: at 13:50

## 2023-03-14 RX ADMIN — GLYCOPYRROLATE 0.6 MG: 0.2 INJECTION, SOLUTION INTRAMUSCULAR; INTRAVENOUS at 13:00

## 2023-03-14 RX ADMIN — ROCURONIUM BROMIDE 50 MG: 10 INJECTION, SOLUTION INTRAVENOUS at 12:14

## 2023-03-14 RX ADMIN — ONDANSETRON 4 MG: 2 INJECTION INTRAMUSCULAR; INTRAVENOUS at 12:23

## 2023-03-14 ASSESSMENT — PAIN DESCRIPTION - PAIN TYPE
TYPE: SURGICAL PAIN

## 2023-03-14 ASSESSMENT — PAIN DESCRIPTION - DESCRIPTORS
DESCRIPTORS: ACHING;DISCOMFORT;SORE
DESCRIPTORS: ACHING;BURNING;DISCOMFORT;SORE
DESCRIPTORS: ACHING;DISCOMFORT;SORE
DESCRIPTORS: BURNING
DESCRIPTORS: ACHING;DISCOMFORT;SORE
DESCRIPTORS: BURNING
DESCRIPTORS: ACHING;BURNING;DISCOMFORT;SORE
DESCRIPTORS: BURNING

## 2023-03-14 ASSESSMENT — PAIN SCALES - GENERAL
PAINLEVEL_OUTOF10: 5
PAINLEVEL_OUTOF10: 3
PAINLEVEL_OUTOF10: 6
PAINLEVEL_OUTOF10: 3
PAINLEVEL_OUTOF10: 3
PAINLEVEL_OUTOF10: 4
PAINLEVEL_OUTOF10: 7
PAINLEVEL_OUTOF10: 7

## 2023-03-14 ASSESSMENT — PAIN DESCRIPTION - FREQUENCY
FREQUENCY: CONTINUOUS

## 2023-03-14 ASSESSMENT — PAIN DESCRIPTION - LOCATION
LOCATION: HEAD;NOSE
LOCATION: NOSE
LOCATION: NOSE
LOCATION: HEAD;NOSE
LOCATION: NOSE
LOCATION: NOSE
LOCATION: HEAD;NOSE
LOCATION: NOSE

## 2023-03-14 ASSESSMENT — LIFESTYLE VARIABLES: SMOKING_STATUS: 0

## 2023-03-14 ASSESSMENT — PAIN DESCRIPTION - ONSET
ONSET: ON-GOING

## 2023-03-14 ASSESSMENT — PAIN - FUNCTIONAL ASSESSMENT: PAIN_FUNCTIONAL_ASSESSMENT: NONE - DENIES PAIN

## 2023-03-14 ASSESSMENT — PAIN DESCRIPTION - ORIENTATION: ORIENTATION: ANTERIOR

## 2023-03-14 NOTE — ANESTHESIA PRE PROCEDURE
Department of Anesthesiology  Preprocedure Note       Name:  Carter Shaw   Age:  30 y.o.  :  1985                                          MRN:  [id number]         Date:  3/14/2023      Surgeon: Shelby Duff):  Adele Ahn DO    Procedure: Procedure(s):  SEPTOPLASTY SUBMUCOUSAL RESECTION INFERIOR NASAL TURBINATES    Medications prior to admission:   Prior to Admission medications    Medication Sig Start Date End Date Taking? Authorizing Provider   HYDROcodone-acetaminophen (NORCO) 5-325 MG per tablet Take 1 tablet by mouth every 6 hours as needed for Pain for up to 3 days. Intended supply: 3 days.  Take lowest dose possible to manage pain Max Daily Amount: 4 tablets 3/14/23 3/17/23 Yes Joseph Thao DO   ondansetron (ZOFRAN) 4 MG tablet Take 1 tablet by mouth 3 times daily as needed for Nausea or Vomiting 3/14/23  Yes Aneta Weller DO   cephALEXin (KEFLEX) 500 MG capsule Take 1 capsule by mouth 3 times daily for 7 days 3/14/23 3/21/23 Yes Aneta Weller DO   Semaglutide-Weight Management (WEGOVY) 0.25 MG/0.5ML SOAJ SC injection Inject 0.25 mg into the skin every 7 days 23   Jordyn Heaton MD   ramelteon (ROZEREM) 8 MG tablet TAKE 1 TABLET BY MOUTH EVERY DAY AT NIGHT  Patient not taking: Reported on 3/13/2023 11/10/22   Historical Provider, MD   albuterol sulfate HFA (PROVENTIL;VENTOLIN;PROAIR) 108 (90 Base) MCG/ACT inhaler INHALE 1 TO 2 PUFFS INTO THE LUNGS EVERY 4 TO 6 HOURS AS NEEDED FOR COUGH OR SHORTNESS OF BREATH 22   Historical Provider, MD   vitamin D (CHOLECALCIFEROL) 125 MCG (5000 UT) CAPS capsule Take 5,000 Units by mouth daily    Historical Provider, MD   magnesium oxide (MAG-OX) 400 MG tablet TAKE 1 TABLET BY MOUTH EVERY DAY 8/15/22   Jordyn Heaton MD   VYVANSE 50 MG capsule Take 50 mg by mouth every morning. 22   Historical Provider, MD   vitamin B-2 (RIBOFLAVIN) 100 MG TABS tablet TAKE 2 TABLETS BY MOUTH EVERY DAY 22   Jordyn Heaton MD   metFORMIN (GLUCOPHAGE) 500 MG tablet Take 1 tablet by mouth 3 times daily  Patient not taking: Reported on 3/13/2023 11/6/21   Historical Provider, MD   ibuprofen (ADVIL;MOTRIN) 800 MG tablet Take 800 mg by mouth every 6 hours as needed for Pain  Patient not taking: Reported on 3/13/2023    Historical Provider, MD       Current medications:    Current Facility-Administered Medications   Medication Dose Route Frequency Provider Last Rate Last Admin   • sodium chloride flush 0.9 % injection 5-40 mL  5-40 mL IntraVENous 2 times per day Joseph Thao DO       • sodium chloride flush 0.9 % injection 5-40 mL  5-40 mL IntraVENous PRN Joseph Thao DO       • 0.9 % sodium chloride infusion   IntraVENous PRN Joseph Thao DO       • ceFAZolin (ANCEF) 2,000 mg in sterile water 20 mL IV syringe  2,000 mg IntraVENous On Call to OR Joseph Thao DO           Allergies:    Allergies   Allergen Reactions   • Bupropion Other (See Comments)     migraines   • Seasonal    • Clindamycin Nausea And Vomiting       Problem List:    Patient Active Problem List   Diagnosis Code   • Upper back pain M54.9   • Anxiety and depression F41.9, F32.A   • Vitamin D insufficiency E55.9   • Skin lesion of breast L98.8   • Thyroid nodule E04.1   • Irregular menstruation N92.6   • PTSD (post-traumatic stress disorder) F43.10   • Recurrent major depressive disorder, in full remission (HCC) F33.42   • Constipation K59.00   • Acute cystitis with hematuria N30.01   • DNS (deviated nasal septum) J34.2   • Post-op pain G89.18       Past Medical History:        Diagnosis Date   • Arthritis    • Asthma     not taking any medications   • CHF (congestive heart failure) ([facility])    • Deviated nasal septum    • History of Chiari malformation     Had initial evaluation by neurologist Dr Anglin - to return for follow up only as needed   • Lesion of mouth        Past Surgical History:        Procedure Laterality Date   • CHOLECYSTECTOMY, LAPAROSCOPIC  2010   •  COLONOSCOPY      LAPAROSCOPY      Ex-lap, abdominal, removed implanted IUD    LEEP  07/2022    Dr. Cheyenne Stafford    [facility] STEROTACTIC LOC BREAST BIOPSY RIGHT Right 04/05/2021    [facility] STEROTACTIC LOC BREAST BIOPSY RIGHT 4/5/2021 [facility]    OTHER SURGICAL HISTORY Right 05/02/2016    tonsil mass incision    TONSILLECTOMY  2004    UPPER GASTROINTESTINAL ENDOSCOPY      WISDOM TOOTH EXTRACTION         Social History:    Social History     Tobacco Use    Smoking status: Never    Smokeless tobacco: Never   Substance Use Topics    Alcohol use: Yes     Alcohol/week: 0.0 standard drinks     Comment: socially                                Counseling given: Not Answered      Vital Signs (Current):   Vitals:    03/13/23 1141 03/14/23 1130 03/14/23 1132   BP:   119/70   Pulse:   78   Resp:  18    Temp:  36.3 °C (97.4 °F)    TempSrc:  Temporal    SpO2:   97%   Weight: 230 lb (104.3 kg) 230 lb (104.3 kg)    Height: 5' 9\" (1.753 m) 5' 9\" (1.753 m)                                               BP Readings from Last 3 Encounters:   03/14/23 119/70   02/13/23 136/86   01/23/23 118/74       NPO Status: Time of last liquid consumption: 0000                        Time of last solid consumption: 2000                        Date of last liquid consumption: 03/13/23                        Date of last solid food consumption: 03/13/23    BMI:   Wt Readings from Last 3 Encounters:   03/14/23 230 lb (104.3 kg)   02/13/23 231 lb (104.8 kg)   01/23/23 232 lb (105.2 kg)     Body mass index is 33.97 kg/m².     CBC:   Lab Results   Component Value Date/Time    WBC 8.0 09/02/2021 12:18 PM    RBC 4.29 09/02/2021 12:18 PM    HGB 13.1 09/02/2021 12:18 PM    HCT 37.7 09/02/2021 12:18 PM    MCV 87.9 09/02/2021 12:18 PM    RDW 11.6 09/02/2021 12:18 PM     09/02/2021 12:18 PM       CMP:   Lab Results   Component Value Date/Time     09/02/2021 12:18 PM    K 4.1 09/02/2021 12:18 PM    K 4.0 03/03/2021 03:12 PM     09/02/2021 12:18 PM    CO2 17 09/02/2021 12:18 PM    BUN 9 09/02/2021 12:18 PM    CREATININE 0.7 09/02/2021 12:18 PM    GFRAA >60 09/02/2021 12:18 PM    LABGLOM >60 09/02/2021 12:18 PM    GLUCOSE 85 09/02/2021 12:18 PM    PROT 7.6 09/02/2021 12:18 PM    CALCIUM 9.7 09/02/2021 12:18 PM    BILITOT 0.3 09/02/2021 12:18 PM    ALKPHOS 40 09/02/2021 12:18 PM    AST 15 09/02/2021 12:18 PM    ALT 14 09/02/2021 12:18 PM       POC Tests: No results for input(s): POCGLU, POCNA, POCK, POCCL, POCBUN, POCHEMO, POCHCT in the last 72 hours. Coags: No results found for: PROTIME, INR, APTT    HCG (If Applicable):   Lab Results   Component Value Date    PREGTESTUR neg 12/15/2020        ABGs: No results found for: PHART, PO2ART, SPM7HLJ, JDK9LGV, BEART, J0SEEHDS     Type & Screen (If Applicable):  No results found for: LABABO, LABRH    Drug/Infectious Status (If Applicable):  No results found for: HIV, HEPCAB    COVID-19 Screening (If Applicable):   Lab Results   Component Value Date/Time    COVID19 Not Detected 10/19/2020 02:55 PM           Anesthesia Evaluation  Patient summary reviewed and Nursing notes reviewed no history of anesthetic complications:   Airway: Mallampati: III  TM distance: >3 FB   Neck ROM: full  Mouth opening: > = 3 FB   Dental: normal exam     Comment: Patient denies any missing, chipped, cracked, or loose teeth. Pulmonary: breath sounds clear to auscultation  (+) asthma:     (-) not a current smoker                           Cardiovascular:Negative CV ROS  Exercise tolerance: good (>4 METS),         ECG reviewed  Rhythm: regular  Rate: normal           Beta Blocker:  Not on Beta Blocker         Neuro/Psych:   (+) psychiatric history: stable with treatmentdepression/anxiety             GI/Hepatic/Renal: Neg GI/Hepatic/Renal ROS            Endo/Other: Negative Endo/Other ROS                    Abdominal:   (+) obese,           Vascular: negative vascular ROS.          Other Findings:      EKG (2/14/22):  NSR HR 84 Anesthesia Plan      general     ASA 3       Induction: intravenous. MIPS: Postoperative opioids intended and Prophylactic antiemetics administered. Anesthetic plan and risks discussed with patient. Use of blood products discussed with patient whom consented to blood products. Plan discussed with CRNA and attending. 1755 Jose David Soler RN   3/14/2023    Pt seen, examined, chart reviewed, plan discussed.   Nicko Mason MD  3/14/2023  12:14 PM

## 2023-03-14 NOTE — H&P
H&P reviewed, no changes. No issues. Questions and concerns were answered to the patient's satisfaction.  Will proceed with procedure    Will discuss with attending     Electronically signed by China Stewart DO on 3/14/23 at 10:45 AM EDT

## 2023-03-14 NOTE — DISCHARGE INSTRUCTIONS
Please follow up with Dr. Roselyn Fountain in 1 week for removal of splints          Open Mouth and cover when sneezing, coughing  No nose blowing for 2 weeks  Nasal saline spray in each nostril 4-5 times a day  Take medicines as directed  Ice to back of neck for comfort  Sleep with head elevated 30 degrees for the next few days

## 2023-03-14 NOTE — OP NOTE
Operative Note      Patient: Remedios Oliveros  YOB: 1985  MRN: [id number]    Date of Procedure: 3/14/2023    Pre-Op Diagnosis: DNS (deviated nasal septum) [J34.2]    Post-Op Diagnosis: Same       Procedure(s):  SEPTOPLASTY SUBMUCOUSAL RESECTION INFERIOR NASAL TURBINATES    Surgeon(s):  Jp Lainez DO    Assistant:   Surgical Assistant: Amber Knapp  Resident: Charline Chatterjee DO; Viktoriya Juarez DO    Anesthesia: General    Estimated Blood Loss (mL): less than 477     Complications: None    Specimens:   ID Type Source Tests Collected by Time Destination   A : Nasal septum contents Tissue Tissue SURGICAL PATHOLOGY Jp Lainez DO 3/14/2023 1251        Implants:  * No implants in log *      Drains: * No LDAs found *    Findings: deviated septum     Detailed Description of Procedure:       PROCEDURE: The patient was consented preoperatively, taken back to the   operating room, identified appropriately, placed in a supine position, given   anesthesia for general intubation. Once the patient was intubated, 10 mL of lidocaine with epinephrine was then injected in the nose in the following areas - the bilaterally anterior nasal septum, bilateral inferior turbinates, and along the septal mucosa on both sides in the sub perichondrial plane. The patient was then packed with 4% cocaine-soaked pledgets, 2 on each side of the patient's nose, and allowed to sit for about 5-10 minutes while she was prepped and draped in a sterile fashion. Septoplasty  Starting on the left side, using a #15 blade, an incision was made in the   junction of the nasal valve on the nasal mucosa. The nasal septum was then   presented out into the field. The incision was cut down to the septal   cartilage and then the perichondrial flap was then elevated on the left side   of the nasal septal cartilage.  Once under the perichondrium, a 30-degree   endoscope was used to complete the elevation of the perichondrial flap as   well as the periosteal flap once reaching the bony cartilaginous junction on   the right side. The bony cartilaginous junction was then disjointed and the   periosteal flap on both sides was also elevated, going back towards to the sphenoid rostrum in an anterior-posterior direction and superior-inferior direction. The flap was elevated as far as possible superiorly, then inferiorly down to the nasal floor on the both sides. A septal knife was then used to cut the septal cartilage inferiorly along the   area of the maxillary crest to create a swinging door effect. The periosteum off the maxillary crest was then elevated. A closed Shahbaz-Nelson was used to reduce the size of the maxillary crest and some of the bone was removed. At the bony cartilaginous junction, the bone did not have a large spur to the right . a Boies elevator was used to lateralize both inferior turbinates. This gave a little bit more room. The initial incision was closed with the same chromic suture in simple fashion. The bilateral inferior turbinates were medialized with a boies elevator. Using an arthrocare Turbinator wand, a small puncture hole was made with a #15 blade in the anterior portion of the leftinferior turbinate. The blade was advanced along the bone of the turbinate, elevating the periostium from the mucosa. The mucosa was then ablated until a thin mucosal layer was left over the bone. The turbinate was then lateralized with a boies elevator. Attention was turned to the right side. Using an arthrocare Turbinator wand, a small puncture hole was made with a #15 blade in the anterior portion of the leftinferior turbinate. The blade was advanced along the bone of the turbinate, elevating the periostium from the mucosa. The mucosa was then ablated until a thin mucosal layer was left over the bone. The turbinate was then lateralized with a boies elevator. Bernstein splints were placed in the nose and sewn into position .  Raul placed. The patient   was then turned back to Anesthesia for appropriate awakening. The patient   tolerated the procedure well.          Electronically signed by Hari Johansen DO on 3/14/2023 at 1:00 PM

## 2023-03-16 NOTE — ANESTHESIA POSTPROCEDURE EVALUATION
Department of Anesthesiology  Postprocedure Note    Patient: Rain Greer  MRN: [id number]  YOB: 1985  Date of evaluation: 3/16/2023      Procedure Summary     Date: 03/14/23 Room / Location: [facility] OR 03 / [facility]    Anesthesia Start: 1205 Anesthesia Stop: 3914    Procedure: SEPTOPLASTY SUBMUCOUSAL RESECTION INFERIOR NASAL TURBINATES (Nose) Diagnosis:       DNS (deviated nasal septum)      (DNS (deviated nasal septum) [J34.2])    Surgeons: Jenn Sadler DO Responsible Provider: Ozell Galeazzi, MD    Anesthesia Type: General ASA Status: 3          Anesthesia Type: General    Mohini Phase I: Mohini Score: 10    Mohini Phase II: Mohini Score: 10      Anesthesia Post Evaluation    Patient location during evaluation: PACU  Patient participation: complete - patient participated  Level of consciousness: awake  Airway patency: patent  Nausea & Vomiting: no nausea and no vomiting  Complications: no  Cardiovascular status: hemodynamically stable  Respiratory status: acceptable  Hydration status: stable

## 2023-03-20 ENCOUNTER — OFFICE VISIT (OUTPATIENT)
Dept: ENT CLINIC | Age: 38
End: 2023-03-20

## 2023-03-20 VITALS — WEIGHT: 230 LBS | HEIGHT: 69 IN | BODY MASS INDEX: 34.07 KG/M2

## 2023-03-20 DIAGNOSIS — J34.2 DEVIATED NASAL SEPTUM: Primary | ICD-10-CM

## 2023-03-20 PROCEDURE — 99024 POSTOP FOLLOW-UP VISIT: CPT | Performed by: OTOLARYNGOLOGY

## 2023-03-20 NOTE — PROGRESS NOTES
problems. Remainder of medical problems as per resident note.       [facility], DO  3/30/23

## 2023-03-22 ENCOUNTER — TELEPHONE (OUTPATIENT)
Dept: FAMILY MEDICINE CLINIC | Age: 38
End: 2023-03-22

## 2023-03-22 DIAGNOSIS — Z86.19 H/O HERPES ZOSTER: Primary | ICD-10-CM

## 2023-03-22 NOTE — TELEPHONE ENCOUNTER
Ok. Lab orders placed for antibodies. She does not have a current case so no other labs would be applicable. Please let patient know. Thanks.

## 2023-03-22 NOTE — TELEPHONE ENCOUNTER
Patient was referred for chronic/recurrent herpes zoster (Shingles). Will check with physician to see if there is testing/labs that can be done.

## 2023-03-22 NOTE — TELEPHONE ENCOUNTER
Ron Chaidez @ [facility] office calling regarding referral placed-    Pt needs labs done before they can schedule her to be seen there-    The office said referral says she is to be seen there for herpes and doesn't have any recent labs done stating that she has actually herpes.

## 2023-03-22 NOTE — TELEPHONE ENCOUNTER
Detailed left for patient informing her labs were ordered to be done for referral to ID.  Asked that she let us know if she needs the orders faxed/emailed

## 2023-04-17 ENCOUNTER — OFFICE VISIT (OUTPATIENT)
Dept: FAMILY MEDICINE CLINIC | Age: 38
End: 2023-04-17
Payer: OTHER GOVERNMENT

## 2023-04-17 VITALS
HEART RATE: 77 BPM | RESPIRATION RATE: 16 BRPM | BODY MASS INDEX: 34.36 KG/M2 | SYSTOLIC BLOOD PRESSURE: 106 MMHG | WEIGHT: 232 LBS | HEIGHT: 69 IN | TEMPERATURE: 97.8 F | OXYGEN SATURATION: 98 % | DIASTOLIC BLOOD PRESSURE: 74 MMHG

## 2023-04-17 DIAGNOSIS — R51.9 DAILY HEADACHE: ICD-10-CM

## 2023-04-17 DIAGNOSIS — E66.09 CLASS 1 OBESITY DUE TO EXCESS CALORIES WITHOUT SERIOUS COMORBIDITY WITH BODY MASS INDEX (BMI) OF 34.0 TO 34.9 IN ADULT: Primary | ICD-10-CM

## 2023-04-17 DIAGNOSIS — J34.2 DNS (DEVIATED NASAL SEPTUM): ICD-10-CM

## 2023-04-17 DIAGNOSIS — G93.5 CHIARI MALFORMATION TYPE I (HCC): ICD-10-CM

## 2023-04-17 PROCEDURE — 1036F TOBACCO NON-USER: CPT | Performed by: FAMILY MEDICINE

## 2023-04-17 PROCEDURE — G8427 DOCREV CUR MEDS BY ELIG CLIN: HCPCS | Performed by: FAMILY MEDICINE

## 2023-04-17 PROCEDURE — 99213 OFFICE O/P EST LOW 20 MIN: CPT | Performed by: FAMILY MEDICINE

## 2023-04-17 PROCEDURE — G8417 CALC BMI ABV UP PARAM F/U: HCPCS | Performed by: FAMILY MEDICINE

## 2023-04-17 RX ORDER — MAGNESIUM OXIDE 400 MG/1
TABLET ORAL
Qty: 90 TABLET | Refills: 1 | Status: SHIPPED | OUTPATIENT
Start: 2023-04-17

## 2023-04-17 NOTE — PROGRESS NOTES
about 2 months (around 6/17/2023). , or sooner if necessary. Counseled regarding above diagnosis, including possible risks and complications,  especially if left uncontrolled. Counseled regarding the possible side effects, risks, benefits and alternatives to treatment; patient and/or guardian verbalizes understanding, agrees, feels comfortable with and wishes to proceed with above treatment plan. Advised patient to call with any new medication issues, and read all Rx info from pharmacy to assure aware of all possible risks and side effects of medication before taking. Patient and/or guardian verbalizes understanding and agrees with above counseling, assessment and plan. All questions answered.

## 2023-05-01 ENCOUNTER — OFFICE VISIT (OUTPATIENT)
Dept: ENT CLINIC | Age: 38
End: 2023-05-01

## 2023-05-01 VITALS — HEIGHT: 69 IN | WEIGHT: 232 LBS | BODY MASS INDEX: 34.36 KG/M2

## 2023-05-01 DIAGNOSIS — J34.2 DEVIATED NASAL SEPTUM: Primary | ICD-10-CM

## 2023-05-01 DIAGNOSIS — R09.81 NASAL CONGESTION: ICD-10-CM

## 2023-05-01 PROCEDURE — 99024 POSTOP FOLLOW-UP VISIT: CPT | Performed by: OTOLARYNGOLOGY

## 2023-05-24 ASSESSMENT — ENCOUNTER SYMPTOMS
COUGH: 0
SHORTNESS OF BREATH: 0
VOMITING: 0

## 2023-06-19 ENCOUNTER — OFFICE VISIT (OUTPATIENT)
Dept: FAMILY MEDICINE CLINIC | Age: 38
End: 2023-06-19
Payer: OTHER GOVERNMENT

## 2023-06-19 ENCOUNTER — HOSPITAL ENCOUNTER (OUTPATIENT)
Age: 38
Discharge: HOME | End: 2023-06-19
Payer: OTHER GOVERNMENT

## 2023-06-19 VITALS
HEIGHT: 69 IN | DIASTOLIC BLOOD PRESSURE: 84 MMHG | WEIGHT: 237 LBS | OXYGEN SATURATION: 99 % | BODY MASS INDEX: 35.1 KG/M2 | RESPIRATION RATE: 16 BRPM | HEART RATE: 98 BPM | TEMPERATURE: 98.6 F | SYSTOLIC BLOOD PRESSURE: 130 MMHG

## 2023-06-19 DIAGNOSIS — E66.09 CLASS 1 OBESITY DUE TO EXCESS CALORIES WITHOUT SERIOUS COMORBIDITY WITH BODY MASS INDEX (BMI) OF 34.0 TO 34.9 IN ADULT: Primary | ICD-10-CM

## 2023-06-19 DIAGNOSIS — R51.9 DAILY HEADACHE: ICD-10-CM

## 2023-06-19 DIAGNOSIS — L08.9 RECURRENT INFECTION OF SKIN: ICD-10-CM

## 2023-06-19 DIAGNOSIS — N39.42 URINARY INCONTINENCE WITHOUT SENSORY AWARENESS: ICD-10-CM

## 2023-06-19 DIAGNOSIS — M79.604 LUMBAR PAIN WITH RADIATION DOWN BOTH LEGS: ICD-10-CM

## 2023-06-19 DIAGNOSIS — G93.5 CHIARI MALFORMATION TYPE I (HCC): ICD-10-CM

## 2023-06-19 DIAGNOSIS — M79.605 LUMBAR PAIN WITH RADIATION DOWN BOTH LEGS: ICD-10-CM

## 2023-06-19 DIAGNOSIS — M54.50 LUMBAR PAIN WITH RADIATION DOWN BOTH LEGS: ICD-10-CM

## 2023-06-19 DIAGNOSIS — J06.9 RECURRENT URI (UPPER RESPIRATORY INFECTION): ICD-10-CM

## 2023-06-19 DIAGNOSIS — Z86.19 H/O HERPES ZOSTER: ICD-10-CM

## 2023-06-19 LAB
ALBUMIN SERPL-MCNC: 4.5 G/DL (ref 3.5–5.2)
ALP SERPL-CCNC: 43 U/L (ref 35–104)
ALT SERPL-CCNC: 9 U/L (ref 0–32)
ANION GAP SERPL CALCULATED.3IONS-SCNC: 10 MMOL/L (ref 7–16)
AST SERPL-CCNC: 12 U/L (ref 0–31)
BASOPHILS # BLD: 0.03 E9/L (ref 0–0.2)
BASOPHILS NFR BLD: 0.4 % (ref 0–2)
BILIRUB SERPL-MCNC: 0.2 MG/DL (ref 0–1.2)
BILIRUBIN, POC: NORMAL
BLOOD URINE, POC: NORMAL
BUN SERPL-MCNC: 12 MG/DL (ref 6–20)
CALCIUM SERPL-MCNC: 9.1 MG/DL (ref 8.6–10.2)
CHLORIDE SERPL-SCNC: 104 MMOL/L (ref 98–107)
CLARITY, POC: NORMAL
CO2 SERPL-SCNC: 25 MMOL/L (ref 22–29)
COLOR, POC: NORMAL
CREAT SERPL-MCNC: 0.7 MG/DL (ref 0.5–1)
EOSINOPHIL # BLD: 0.2 E9/L (ref 0.05–0.5)
EOSINOPHIL NFR BLD: 2.4 % (ref 0–6)
ERYTHROCYTE [DISTWIDTH] IN BLOOD BY AUTOMATED COUNT: 11.8 FL (ref 11.5–15)
GLUCOSE SERPL-MCNC: 87 MG/DL (ref 74–99)
GLUCOSE URINE, POC: NORMAL
HCT VFR BLD AUTO: 43 % (ref 34–48)
HGB BLD-MCNC: 14.5 G/DL (ref 11.5–15.5)
IMM GRANULOCYTES # BLD: 0.04 E9/L
IMM GRANULOCYTES NFR BLD: 0.5 % (ref 0–5)
KETONES, POC: NORMAL
LEUKOCYTE EST, POC: NORMAL
LYMPHOCYTES # BLD: 2.14 E9/L (ref 1.5–4)
LYMPHOCYTES NFR BLD: 25.2 % (ref 20–42)
MCH RBC QN AUTO: 31.5 PG (ref 26–35)
MCHC RBC AUTO-ENTMCNC: 33.7 % (ref 32–34.5)
MCV RBC AUTO: 93.5 FL (ref 80–99.9)
MONOCYTES # BLD: 0.47 E9/L (ref 0.1–0.95)
MONOCYTES NFR BLD: 5.5 % (ref 2–12)
NEUTROPHILS # BLD: 5.62 E9/L (ref 1.8–7.3)
NEUTS SEG NFR BLD: 66 % (ref 43–80)
NITRITE, POC: NORMAL
PH, POC: 6
PLATELET # BLD AUTO: 367 E9/L (ref 130–450)
PMV BLD AUTO: 10.5 FL (ref 7–12)
POTASSIUM SERPL-SCNC: 4.3 MMOL/L (ref 3.5–5)
PROT SERPL-MCNC: 7.2 G/DL (ref 6.4–8.3)
PROTEIN, POC: NORMAL
RBC # BLD AUTO: 4.6 E12/L (ref 3.5–5.5)
SODIUM SERPL-SCNC: 139 MMOL/L (ref 132–146)
SPECIFIC GRAVITY, POC: >1.03
UROBILINOGEN, POC: 0.2
WBC # BLD: 8.5 E9/L (ref 4.5–11.5)

## 2023-06-19 PROCEDURE — 85025 COMPLETE CBC W/AUTO DIFF WBC: CPT

## 2023-06-19 PROCEDURE — 36415 COLL VENOUS BLD VENIPUNCTURE: CPT

## 2023-06-19 PROCEDURE — 86694 HERPES SIMPLEX NES ANTBDY: CPT

## 2023-06-19 PROCEDURE — 86703 HIV-1/HIV-2 1 RESULT ANTBDY: CPT

## 2023-06-19 PROCEDURE — 81002 URINALYSIS NONAUTO W/O SCOPE: CPT | Performed by: FAMILY MEDICINE

## 2023-06-19 PROCEDURE — 86359 T CELLS TOTAL COUNT: CPT

## 2023-06-19 PROCEDURE — 99214 OFFICE O/P EST MOD 30 MIN: CPT | Performed by: FAMILY MEDICINE

## 2023-06-19 PROCEDURE — 86696 HERPES SIMPLEX TYPE 2 TEST: CPT

## 2023-06-19 PROCEDURE — 86360 T CELL ABSOLUTE COUNT/RATIO: CPT

## 2023-06-19 PROCEDURE — 82784 ASSAY IGA/IGD/IGG/IGM EACH: CPT

## 2023-06-19 PROCEDURE — 82787 IGG 1 2 3 OR 4 EACH: CPT

## 2023-06-19 PROCEDURE — G8427 DOCREV CUR MEDS BY ELIG CLIN: HCPCS | Performed by: FAMILY MEDICINE

## 2023-06-19 PROCEDURE — G8417 CALC BMI ABV UP PARAM F/U: HCPCS | Performed by: FAMILY MEDICINE

## 2023-06-19 PROCEDURE — 80053 COMPREHEN METABOLIC PANEL: CPT

## 2023-06-19 PROCEDURE — 86695 HERPES SIMPLEX TYPE 1 TEST: CPT

## 2023-06-19 PROCEDURE — 1036F TOBACCO NON-USER: CPT | Performed by: FAMILY MEDICINE

## 2023-06-19 RX ORDER — MAGNESIUM OXIDE 400 MG/1
TABLET ORAL
Qty: 90 TABLET | Refills: 1 | Status: SHIPPED | OUTPATIENT
Start: 2023-06-19

## 2023-06-19 RX ORDER — LISDEXAMFETAMINE DIMESYLATE 60 MG/1
1 CAPSULE ORAL DAILY
COMMUNITY
Start: 2023-05-28

## 2023-06-19 NOTE — PROGRESS NOTES
Subjective:  41 y.o. y/o female is here for f/u chronic issues including obesity.      Obesity: Taking Wegovy 0.5mg weekly, tolerating fine, no weight loss, ran out of medication couple weeks ago   Watching diet  ?insurance won't cover medical bariatric clinic    Concerned about off/on urinary incontinence, no warning  Not necessarily with cough/jump/etc  Not urgency  Couple months  +chronic lumbar back pain  +pain down both sides  No leg weakness  Controls defecation OK  No groin paresthesias     ENT: Dr. Naina Castillo  +septoplasty with submucous resection of inferior turbinates done 3/23  +BPPV improved  +allergies, doing OK    Shingles now 4 times, treated with valtrex at [address]  +immuno-competent  Saw ID 4/23, reviewed OV, needs to do labs as ordered AND to call ID with recurrence    Had to cancel with [facility] neurologist who specializes in headaches; been busy and not tried to reschedule  Did see Dr. Steven Greenfield, Chiari malformation type I, 4/21 OV, recommending Chiari decompression if no improvement after working with neurologist  Taking motrin  Always with headaches, no changes  Drinking plenty of water    Psych: Comprehensive, +depression and PTSD, +ADHD, +Vyvanse only now, doing OK, always fatigued     Sleep medicine: 6/22 OV, +chronic sleep initiation/maintenance insomnia, +sleep study-showed significant snoring, +sent to ENT (turbinate surgery done)    Ob/gyn: +PCOS, Dr. Daryl Caldwell, ibuprofen 800mg for cramping  Tried OCP, didn't tolerate well, affected mood  LEEP with conazation 7/22, [facility]IL  Due for repeat pap/pelvic exam      Review of Systems:  Constitutional:  No fever, + fatigue, no chills, + headaches, no weight change  Dermatology:  no rash, no mole, + dry or sensitive skin  ENT:  No cough, no sore throat, no sinus pain, no runny nose, no ear pain, +chronic congestion (controlled with flonase usually)  Cardiology:  No chest pain, +occ palpitations, no leg edema, no shortness of breath, no

## 2023-06-20 LAB — HIV1+2 AB SERPL QL IA: NORMAL

## 2023-06-21 PROBLEM — G93.5 CHIARI MALFORMATION TYPE I (HCC): Status: ACTIVE | Noted: 2023-06-21

## 2023-06-21 PROBLEM — E66.09 CLASS 1 OBESITY DUE TO EXCESS CALORIES WITHOUT SERIOUS COMORBIDITY WITH BODY MASS INDEX (BMI) OF 34.0 TO 34.9 IN ADULT: Status: ACTIVE | Noted: 2023-06-21

## 2023-06-21 PROBLEM — R51.9 DAILY HEADACHE: Status: ACTIVE | Noted: 2023-06-21

## 2023-06-21 PROBLEM — Z86.19 H/O HERPES ZOSTER: Status: ACTIVE | Noted: 2023-06-21

## 2023-06-21 PROBLEM — E66.811 CLASS 1 OBESITY DUE TO EXCESS CALORIES WITHOUT SERIOUS COMORBIDITY WITH BODY MASS INDEX (BMI) OF 34.0 TO 34.9 IN ADULT: Status: ACTIVE | Noted: 2023-06-21

## 2023-06-21 LAB
CD3 CELLS # BLD: 1630 CELLS/UL (ref 570–2400)
CD3+CD4+ CELLS # BLD: 1199 CELLS/UL (ref 430–1800)
CD3+CD4+ CELLS/CD3+CD8+ CLL BLD: 2.65 RATIO (ref 0.8–3.9)
CD3+CD8+ CELLS # BLD: 453 CELLS/UL (ref 210–1200)
IGA SERPL-MCNC: 144 MG/DL (ref 70–400)
IGG SERPL-MCNC: 1015 MG/DL (ref 700–1600)
IGG1 SER-MCNC: 482 MG/DL (ref 240–1118)
IGG2 SER-MCNC: 458 MG/DL (ref 124–549)
IGG3 SER-MCNC: 93 MG/DL (ref 21–134)
IGG4 SER-MCNC: 24 MG/DL (ref 1–123)
IGM SERPL-MCNC: 150 MG/DL (ref 40–230)
LYMPH SUBSET INFORMATION: NORMAL

## 2023-06-21 RX ORDER — CEFDINIR 300 MG/1
300 CAPSULE ORAL 2 TIMES DAILY
Qty: 10 CAPSULE | Refills: 0 | Status: SHIPPED | OUTPATIENT
Start: 2023-06-21 | End: 2023-06-26

## 2023-06-22 LAB
BACTERIA UR CULT: ABNORMAL
HSV1 GG IGG SER-ACNC: 34.5 IV
HSV1+2 IGG SER IA-ACNC: >22.4 IV
HSV1+2 IGM SER IA-ACNC: 0.59 IV
HSV2 GG IGG SER-ACNC: 0.17 IV
ORGANISM: ABNORMAL

## 2023-07-18 ENCOUNTER — HOSPITAL ENCOUNTER (OUTPATIENT)
Age: 38
Discharge: HOME | End: 2023-07-18
Payer: OTHER GOVERNMENT

## 2023-07-18 ENCOUNTER — HOSPITAL ENCOUNTER (OUTPATIENT)
Dept: CT IMAGING | Age: 38
Discharge: HOME | End: 2023-07-20
Payer: OTHER GOVERNMENT

## 2023-07-18 ENCOUNTER — OFFICE VISIT (OUTPATIENT)
Dept: FAMILY MEDICINE CLINIC | Age: 38
End: 2023-07-18
Payer: OTHER GOVERNMENT

## 2023-07-18 VITALS
TEMPERATURE: 98.4 F | WEIGHT: 230 LBS | BODY MASS INDEX: 34.07 KG/M2 | HEART RATE: 62 BPM | RESPIRATION RATE: 18 BRPM | HEIGHT: 69 IN | SYSTOLIC BLOOD PRESSURE: 95 MMHG | DIASTOLIC BLOOD PRESSURE: 68 MMHG

## 2023-07-18 DIAGNOSIS — R10.9 FLANK PAIN: ICD-10-CM

## 2023-07-18 DIAGNOSIS — R10.9 FLANK PAIN: Primary | ICD-10-CM

## 2023-07-18 DIAGNOSIS — N92.6 MISSED PERIODS: ICD-10-CM

## 2023-07-18 DIAGNOSIS — R30.0 DYSURIA: ICD-10-CM

## 2023-07-18 DIAGNOSIS — R32 DAILY URINARY INCONTINENCE: ICD-10-CM

## 2023-07-18 LAB
ANION GAP SERPL CALCULATED.3IONS-SCNC: 11 MMOL/L (ref 7–16)
BILIRUBIN, POC: ABNORMAL
BLOOD URINE, POC: ABNORMAL
BUN SERPL-MCNC: 11 MG/DL (ref 6–20)
CALCIUM SERPL-MCNC: 9.5 MG/DL (ref 8.6–10.2)
CHLORIDE SERPL-SCNC: 104 MMOL/L (ref 98–107)
CLARITY, POC: CLEAR
CO2 SERPL-SCNC: 25 MMOL/L (ref 22–29)
COLOR, POC: ABNORMAL
CREAT SERPL-MCNC: 0.8 MG/DL (ref 0.5–1)
ERYTHROCYTE [DISTWIDTH] IN BLOOD BY AUTOMATED COUNT: 11.7 % (ref 11.5–15)
GFR SERPL CREATININE-BSD FRML MDRD: >60 ML/MIN/1.73M2
GLUCOSE SERPL-MCNC: 79 MG/DL (ref 74–99)
GLUCOSE URINE, POC: ABNORMAL
HCG UR QL: NEGATIVE
HCT VFR BLD AUTO: 42.6 % (ref 34–48)
HGB BLD-MCNC: 14.4 G/DL (ref 11.5–15.5)
KETONES, POC: ABNORMAL
LEUKOCYTE EST, POC: ABNORMAL
MCH RBC QN AUTO: 31.2 PG (ref 26–35)
MCHC RBC AUTO-ENTMCNC: 33.8 G/DL (ref 32–34.5)
MCV RBC AUTO: 92.2 FL (ref 80–99.9)
NITRITE, POC: ABNORMAL
PH, POC: 6.5
PLATELET # BLD AUTO: 356 K/UL (ref 130–450)
PMV BLD AUTO: 10.7 FL (ref 7–12)
POTASSIUM SERPL-SCNC: 4.3 MMOL/L (ref 3.5–5)
PROTEIN, POC: ABNORMAL
RBC # BLD AUTO: 4.62 M/UL (ref 3.5–5.5)
SODIUM SERPL-SCNC: 140 MMOL/L (ref 132–146)
SPECIFIC GRAVITY, POC: 1.02
UROBILINOGEN, POC: 0.2
WBC OTHER # BLD: 8 K/UL (ref 4.5–11.5)

## 2023-07-18 PROCEDURE — 99214 OFFICE O/P EST MOD 30 MIN: CPT

## 2023-07-18 PROCEDURE — 81002 URINALYSIS NONAUTO W/O SCOPE: CPT

## 2023-07-18 PROCEDURE — G8417 CALC BMI ABV UP PARAM F/U: HCPCS

## 2023-07-18 PROCEDURE — 87086 URINE CULTURE/COLONY COUNT: CPT

## 2023-07-18 PROCEDURE — 1036F TOBACCO NON-USER: CPT

## 2023-07-18 PROCEDURE — G8427 DOCREV CUR MEDS BY ELIG CLIN: HCPCS

## 2023-07-18 PROCEDURE — 85027 COMPLETE CBC AUTOMATED: CPT

## 2023-07-18 PROCEDURE — 80048 BASIC METABOLIC PNL TOTAL CA: CPT

## 2023-07-18 PROCEDURE — 74176 CT ABD & PELVIS W/O CONTRAST: CPT

## 2023-07-18 PROCEDURE — 87077 CULTURE AEROBIC IDENTIFY: CPT

## 2023-07-18 PROCEDURE — 84703 CHORIONIC GONADOTROPIN ASSAY: CPT

## 2023-07-18 RX ORDER — CIPROFLOXACIN 500 MG/1
500 TABLET, FILM COATED ORAL 2 TIMES DAILY
Qty: 14 TABLET | Refills: 0 | Status: SHIPPED | OUTPATIENT
Start: 2023-07-18 | End: 2023-07-25

## 2023-07-18 SDOH — ECONOMIC STABILITY: FOOD INSECURITY: WITHIN THE PAST 12 MONTHS, YOU WORRIED THAT YOUR FOOD WOULD RUN OUT BEFORE YOU GOT MONEY TO BUY MORE.: NEVER TRUE

## 2023-07-18 SDOH — ECONOMIC STABILITY: HOUSING INSECURITY
IN THE LAST 12 MONTHS, WAS THERE A TIME WHEN YOU DID NOT HAVE A STEADY PLACE TO SLEEP OR SLEPT IN A SHELTER (INCLUDING NOW)?: NO

## 2023-07-18 SDOH — ECONOMIC STABILITY: FOOD INSECURITY: WITHIN THE PAST 12 MONTHS, THE FOOD YOU BOUGHT JUST DIDN'T LAST AND YOU DIDN'T HAVE MONEY TO GET MORE.: NEVER TRUE

## 2023-07-18 SDOH — ECONOMIC STABILITY: INCOME INSECURITY: HOW HARD IS IT FOR YOU TO PAY FOR THE VERY BASICS LIKE FOOD, HOUSING, MEDICAL CARE, AND HEATING?: NOT HARD AT ALL

## 2023-07-18 ASSESSMENT — ENCOUNTER SYMPTOMS
NAUSEA: 1
VOMITING: 0
ABDOMINAL PAIN: 0

## 2023-07-18 NOTE — PROGRESS NOTES
[address] [facility]      Department of Family Medicine  Family Medicine Residency  Phone: [phone]   Fax: [phone]    7/18/23    Shea Alcazar is a 41 y.o. female presenting to the outpatient clinic for:  Chief Complaint   Patient presents with    Flank Pain     R side, intermittent    Dysuria        HPI:    Patient presented with right flank pain, starting this weekend, pain started Saturday and has been increasing, she describes it as stabbing pain, describes it as 7/10, radiating up, and she does feel a little nauseous when she feels the pain. Patient did present last month with the history of incontinence and increased frequency when she was started on antibiotics. Patient claims her symptoms are not better even after she has completed the course of antibiotics. Patient did mention that she has been feeling more dizzy lately. Patient does not have history of kidney stones . She denies history of blood in urine or fever but she did feel warm and sweaty once yesterday. Her bowel habit is normal.  Patient missed her period this month which was due on first week of July.       BP Readings from Last 3 Encounters:   07/18/23 95/68   06/19/23 130/84   04/24/23 124/84        Allergies   Allergen Reactions    Bupropion Other (See Comments)     migraines    Seasonal     Clindamycin Nausea And Vomiting       Past Medical & Surgical History:      Diagnosis Date    Arthritis     Asthma     not taking any medications    CHF (congestive heart failure) (HCC)     Deviated nasal septum     History of Chiari malformation     Had initial evaluation by neurologist Dr Tabitha Hernandez - to return for follow up only as needed    Lesion of mouth      Past Surgical History:   Procedure Laterality Date    CHOLECYSTECTOMY, LAPAROSCOPIC  2010    COLONOSCOPY      LAPAROSCOPY      Ex-lap, abdominal, removed implanted IUD    LEEP  07/2022    Dr. Malorie Fonseca    [facility] STEROTACTIC LOC BREAST BIOPSY RIGHT Right 04/05/2021

## 2023-07-20 LAB
CULTURE: ABNORMAL
SPECIMEN DESCRIPTION: ABNORMAL

## 2023-07-22 LAB
MICROORGANISM SPEC CULT: ABNORMAL
SPECIMEN DESCRIPTION: ABNORMAL

## 2023-08-19 DIAGNOSIS — E66.09 CLASS 1 OBESITY DUE TO EXCESS CALORIES WITHOUT SERIOUS COMORBIDITY WITH BODY MASS INDEX (BMI) OF 34.0 TO 34.9 IN ADULT: Primary | ICD-10-CM

## 2023-08-23 DIAGNOSIS — E66.09 CLASS 1 OBESITY DUE TO EXCESS CALORIES WITHOUT SERIOUS COMORBIDITY WITH BODY MASS INDEX (BMI) OF 34.0 TO 34.9 IN ADULT: ICD-10-CM

## 2023-09-15 DIAGNOSIS — E66.09 CLASS 1 OBESITY DUE TO EXCESS CALORIES WITHOUT SERIOUS COMORBIDITY WITH BODY MASS INDEX (BMI) OF 34.0 TO 34.9 IN ADULT: ICD-10-CM

## 2023-09-27 ENCOUNTER — HOSPITAL ENCOUNTER (EMERGENCY)
Age: 38
Discharge: HOME | End: 2023-09-27
Attending: EMERGENCY MEDICINE
Payer: OTHER GOVERNMENT

## 2023-09-27 VITALS
HEIGHT: 69 IN | OXYGEN SATURATION: 100 % | SYSTOLIC BLOOD PRESSURE: 136 MMHG | HEART RATE: 90 BPM | WEIGHT: 220 LBS | RESPIRATION RATE: 18 BRPM | DIASTOLIC BLOOD PRESSURE: 88 MMHG | BODY MASS INDEX: 32.58 KG/M2 | TEMPERATURE: 98 F

## 2023-09-27 DIAGNOSIS — R10.13 ABDOMINAL PAIN, EPIGASTRIC: Primary | ICD-10-CM

## 2023-09-27 LAB
ALBUMIN SERPL-MCNC: 4.3 G/DL (ref 3.5–5.2)
ALP SERPL-CCNC: 41 U/L (ref 35–104)
ALT SERPL-CCNC: 11 U/L (ref 0–32)
AMORPH SED URNS QL MICRO: ABNORMAL
ANION GAP SERPL CALCULATED.3IONS-SCNC: 10 MMOL/L (ref 7–16)
AST SERPL-CCNC: 14 U/L (ref 0–31)
BACTERIA URNS QL MICRO: ABNORMAL
BASOPHILS # BLD: 0.02 K/UL (ref 0–0.2)
BASOPHILS NFR BLD: 0 % (ref 0–2)
BILIRUB SERPL-MCNC: 0.5 MG/DL (ref 0–1.2)
BILIRUB UR QL STRIP: NEGATIVE
BUN SERPL-MCNC: 12 MG/DL (ref 6–20)
CALCIUM SERPL-MCNC: 9.2 MG/DL (ref 8.6–10.2)
CASTS #/AREA URNS LPF: ABNORMAL /LPF
CHARACTER UR: ABNORMAL
CHLORIDE SERPL-SCNC: 104 MMOL/L (ref 98–107)
CLARITY UR: CLEAR
CO2 SERPL-SCNC: 25 MMOL/L (ref 22–29)
COLOR UR: YELLOW
CREAT SERPL-MCNC: 0.8 MG/DL (ref 0.5–1)
CRYSTALS URNS MICRO: ABNORMAL /HPF
EOSINOPHIL # BLD: 0.12 K/UL (ref 0.05–0.5)
EOSINOPHILS RELATIVE PERCENT: 2 % (ref 0–6)
EPI CELLS #/AREA URNS HPF: ABNORMAL /HPF
ERYTHROCYTE [DISTWIDTH] IN BLOOD BY AUTOMATED COUNT: 11.7 % (ref 11.5–15)
GFR SERPL CREATININE-BSD FRML MDRD: >60 ML/MIN/1.73M2
GLUCOSE SERPL-MCNC: 117 MG/DL (ref 74–99)
GLUCOSE UR STRIP-MCNC: NEGATIVE MG/DL
HCG UR QL: NEGATIVE
HCT VFR BLD AUTO: 40.1 % (ref 34–48)
HGB BLD-MCNC: 13.8 G/DL (ref 11.5–15.5)
HGB UR QL STRIP.AUTO: NEGATIVE
IMM GRANULOCYTES # BLD AUTO: <0.03 K/UL (ref 0–0.58)
IMM GRANULOCYTES NFR BLD: 0 % (ref 0–5)
KETONES UR STRIP-MCNC: NEGATIVE MG/DL
LEUKOCYTE ESTERASE UR QL STRIP: NEGATIVE
LIPASE SERPL-CCNC: 30 U/L (ref 13–60)
LYMPHOCYTES NFR BLD: 1.94 K/UL (ref 1.5–4)
LYMPHOCYTES RELATIVE PERCENT: 27 % (ref 20–42)
MCH RBC QN AUTO: 31.2 PG (ref 26–35)
MCHC RBC AUTO-ENTMCNC: 34.4 G/DL (ref 32–34.5)
MCV RBC AUTO: 90.7 FL (ref 80–99.9)
MONOCYTES NFR BLD: 0.47 K/UL (ref 0.1–0.95)
MONOCYTES NFR BLD: 7 % (ref 2–12)
MUCOUS THREADS URNS QL MICRO: ABNORMAL
NEUTROPHILS NFR BLD: 65 % (ref 43–80)
NEUTS SEG NFR BLD: 4.66 K/UL (ref 1.8–7.3)
NITRITE UR QL STRIP: NEGATIVE
PH UR STRIP: 7 [PH] (ref 5–9)
PLATELET # BLD AUTO: 360 K/UL (ref 130–450)
PMV BLD AUTO: 10.2 FL (ref 7–12)
POTASSIUM SERPL-SCNC: 4.2 MMOL/L (ref 3.5–5)
PROT SERPL-MCNC: 7.4 G/DL (ref 6.4–8.3)
PROT UR STRIP-MCNC: ABNORMAL MG/DL
RBC # BLD AUTO: 4.42 M/UL (ref 3.5–5.5)
RBC #/AREA URNS HPF: ABNORMAL /HPF
RENAL EPITHELIAL, UA: ABNORMAL /HPF
SODIUM SERPL-SCNC: 139 MMOL/L (ref 132–146)
SP GR UR STRIP: 1.02 (ref 1–1.03)
TRICHOMONAS #/AREA URNS HPF: ABNORMAL /[HPF]
UROBILINOGEN UR STRIP-ACNC: 0.2 EU/DL (ref 0–1)
WBC #/AREA URNS HPF: ABNORMAL /HPF
WBC OTHER # BLD: 7.2 K/UL (ref 4.5–11.5)
YEAST URNS QL MICRO: ABNORMAL

## 2023-09-27 PROCEDURE — 6370000000 HC RX 637 (ALT 250 FOR IP): Performed by: EMERGENCY MEDICINE

## 2023-09-27 PROCEDURE — 81001 URINALYSIS AUTO W/SCOPE: CPT

## 2023-09-27 PROCEDURE — 99284 EMERGENCY DEPT VISIT MOD MDM: CPT

## 2023-09-27 PROCEDURE — 2580000003 HC RX 258: Performed by: EMERGENCY MEDICINE

## 2023-09-27 PROCEDURE — 6360000002 HC RX W HCPCS: Performed by: EMERGENCY MEDICINE

## 2023-09-27 PROCEDURE — 96375 TX/PRO/DX INJ NEW DRUG ADDON: CPT

## 2023-09-27 PROCEDURE — 96374 THER/PROPH/DIAG INJ IV PUSH: CPT

## 2023-09-27 PROCEDURE — 84703 CHORIONIC GONADOTROPIN ASSAY: CPT

## 2023-09-27 PROCEDURE — 83690 ASSAY OF LIPASE: CPT

## 2023-09-27 PROCEDURE — 80053 COMPREHEN METABOLIC PANEL: CPT

## 2023-09-27 PROCEDURE — 85025 COMPLETE CBC W/AUTO DIFF WBC: CPT

## 2023-09-27 RX ORDER — ONDANSETRON 4 MG/1
4 TABLET, ORALLY DISINTEGRATING ORAL EVERY 8 HOURS PRN
Qty: 20 TABLET | Refills: 0 | Status: SHIPPED | OUTPATIENT
Start: 2023-09-27 | End: 2023-10-04

## 2023-09-27 RX ORDER — 0.9 % SODIUM CHLORIDE 0.9 %
1000 INTRAVENOUS SOLUTION INTRAVENOUS ONCE
Status: COMPLETED | OUTPATIENT
Start: 2023-09-27 | End: 2023-09-27

## 2023-09-27 RX ORDER — ONDANSETRON 2 MG/ML
4 INJECTION INTRAMUSCULAR; INTRAVENOUS ONCE
Status: COMPLETED | OUTPATIENT
Start: 2023-09-27 | End: 2023-09-27

## 2023-09-27 RX ORDER — FAMOTIDINE 10 MG
20 TABLET ORAL 2 TIMES DAILY
Qty: 120 TABLET | Refills: 0 | Status: SHIPPED | OUTPATIENT
Start: 2023-09-27 | End: 2023-10-27

## 2023-09-27 RX ORDER — FENTANYL CITRATE 50 UG/ML
50 INJECTION, SOLUTION INTRAMUSCULAR; INTRAVENOUS ONCE
Status: COMPLETED | OUTPATIENT
Start: 2023-09-27 | End: 2023-09-27

## 2023-09-27 RX ADMIN — FENTANYL CITRATE 50 MCG: 50 INJECTION INTRAMUSCULAR; INTRAVENOUS at 08:22

## 2023-09-27 RX ADMIN — Medication: at 07:19

## 2023-09-27 RX ADMIN — ONDANSETRON 4 MG: 2 INJECTION INTRAMUSCULAR; INTRAVENOUS at 07:19

## 2023-09-27 RX ADMIN — SODIUM CHLORIDE 1000 ML: 9 INJECTION, SOLUTION INTRAVENOUS at 07:19

## 2023-09-27 ASSESSMENT — PAIN - FUNCTIONAL ASSESSMENT: PAIN_FUNCTIONAL_ASSESSMENT: 0-10

## 2023-09-27 ASSESSMENT — PAIN SCALES - GENERAL: PAINLEVEL_OUTOF10: 5

## 2023-09-27 NOTE — ED NOTES
Discharge instructions given and pt verbalized understanding. Gait steady. No distress noted.       Freddy Johnson RN  09/27/23 5942

## 2023-09-28 ENCOUNTER — TELEPHONE (OUTPATIENT)
Dept: FAMILY MEDICINE CLINIC | Age: 38
End: 2023-09-28

## 2023-09-28 ENCOUNTER — OFFICE VISIT (OUTPATIENT)
Dept: FAMILY MEDICINE CLINIC | Age: 38
End: 2023-09-28
Payer: OTHER GOVERNMENT

## 2023-09-28 VITALS
WEIGHT: 222 LBS | OXYGEN SATURATION: 98 % | HEART RATE: 80 BPM | HEIGHT: 69 IN | DIASTOLIC BLOOD PRESSURE: 70 MMHG | BODY MASS INDEX: 32.88 KG/M2 | RESPIRATION RATE: 18 BRPM | SYSTOLIC BLOOD PRESSURE: 126 MMHG | TEMPERATURE: 97.8 F

## 2023-09-28 DIAGNOSIS — R11.2 NAUSEA AND VOMITING, UNSPECIFIED VOMITING TYPE: ICD-10-CM

## 2023-09-28 DIAGNOSIS — R10.84 GENERALIZED ABDOMINAL PAIN: Primary | ICD-10-CM

## 2023-09-28 PROCEDURE — G8417 CALC BMI ABV UP PARAM F/U: HCPCS

## 2023-09-28 PROCEDURE — 1036F TOBACCO NON-USER: CPT

## 2023-09-28 PROCEDURE — 99214 OFFICE O/P EST MOD 30 MIN: CPT

## 2023-09-28 PROCEDURE — G8427 DOCREV CUR MEDS BY ELIG CLIN: HCPCS

## 2023-09-28 RX ORDER — ONDANSETRON HYDROCHLORIDE 8 MG/1
8 TABLET, FILM COATED ORAL 3 TIMES DAILY PRN
Qty: 30 TABLET | Refills: 1 | Status: SHIPPED | OUTPATIENT
Start: 2023-09-28

## 2023-09-28 RX ORDER — PANTOPRAZOLE SODIUM 40 MG/1
40 TABLET, DELAYED RELEASE ORAL
Qty: 30 TABLET | Refills: 3 | Status: SHIPPED
Start: 2023-09-28 | End: 2023-10-20 | Stop reason: SDUPTHER

## 2023-09-28 RX ORDER — SUCRALFATE 1 G/1
1 TABLET ORAL 4 TIMES DAILY
Qty: 120 TABLET | Refills: 3 | Status: SHIPPED
Start: 2023-09-28 | End: 2023-10-20 | Stop reason: SDUPTHER

## 2023-09-28 NOTE — TELEPHONE ENCOUNTER
----- Message from WINCLE. Dominick Dubin sent at 9/27/2023  2:02 PM EDT -----  Regarding: Vomiting   Contact: [phone]  Hello-I was seen in the ER this morning, and er notes state to inform my doctor in an event I vomit again. I have vomited and cannot keep food down. If you would please provide guidance. Thank you!

## 2023-09-28 NOTE — PROGRESS NOTES
[facility]  Department of Family Medicine  Family Medicine Residency Program      Patient: Dwaine Lott  1985 41 y.o. female     Date of Service: 9/29/2023      Chief complaint:   Chief Complaint   Patient presents with    ED Follow-up     9/27 Vomiting, abdominal pain/cramping       HISTORY OF PRESENTING ILLNESS     Dwaine Lott is a 41 y.o. female presented to the clinic for an Ed follow up after going yesterday 9/27/22    ED labs reviewed - CBC, CMP, UA, lipase WNL   Fentynal , gi cocktail - helped a little   Patient was started on famotitine 20mg BID and zofran PRN every 8 hours  , OTC tums     Pepcid subsided the heart burn     Still throwing up, no blood  , cant even keep cheeros down     Heart burn for a week     Pain: abdominal pain   Location epigastric - right in the middle   Intensity 10/10 when the spasm comes   Quality - Abdominal spasms, feels like being stabbed - lasts 10 -15 sec , all the time throughtout the day   Onset - Monday   Progression -worse   Radiation -shoots outs   Alleviating - nothing   Aggrevating -nothing in particular, no changes in diet, movement.    Associated symptoms - vomting, nausea  Denies fever, chills, chest pain, shortness of breath, numbness, tingling, loss of bowel or bladder control  Dark and firm stools - normally stools once a day     Patient is on semaglutide since march     Ibuprofen 800 mg from obgyn due to menstrual cycle pains, not taken for months   Takes tylenol now     EGD and colonoscopy  - Dr Corrie Packer often , because she would stretch the esophgus and a history of ulcer > 3 years    Surgeries include: Gallbladder removed   Lapascopic cone and cervical cancer seizure         Past Medical History:      Diagnosis Date    Arthritis     Asthma     not taking any medications    CHF (congestive heart failure) ([address])     Deviated nasal septum     History of Chiari malformation     Had initial evaluation by neurologist Dr Jovan Guerra - to

## 2023-09-29 ASSESSMENT — ENCOUNTER SYMPTOMS
ABDOMINAL PAIN: 1
COUGH: 0
SINUS PAIN: 0
SHORTNESS OF BREATH: 0
EYE PAIN: 0
DIARRHEA: 0
SINUS PRESSURE: 0
VOMITING: 1
NAUSEA: 1
EYE REDNESS: 0
EYE DISCHARGE: 0
BLOOD IN STOOL: 0
CHEST TIGHTNESS: 0

## 2023-10-02 DIAGNOSIS — E66.09 CLASS 1 OBESITY DUE TO EXCESS CALORIES WITHOUT SERIOUS COMORBIDITY WITH BODY MASS INDEX (BMI) OF 34.0 TO 34.9 IN ADULT: ICD-10-CM

## 2023-10-03 ENCOUNTER — OFFICE VISIT (OUTPATIENT)
Dept: SURGERY | Age: 38
End: 2023-10-03
Payer: OTHER GOVERNMENT

## 2023-10-03 VITALS
HEART RATE: 82 BPM | DIASTOLIC BLOOD PRESSURE: 84 MMHG | WEIGHT: 225 LBS | RESPIRATION RATE: 16 BRPM | TEMPERATURE: 97.6 F | BODY MASS INDEX: 33.33 KG/M2 | SYSTOLIC BLOOD PRESSURE: 117 MMHG | HEIGHT: 69 IN

## 2023-10-03 DIAGNOSIS — K27.9 PEPTIC ULCER DISEASE: Primary | ICD-10-CM

## 2023-10-03 DIAGNOSIS — K21.9 GASTROESOPHAGEAL REFLUX DISEASE, UNSPECIFIED WHETHER ESOPHAGITIS PRESENT: ICD-10-CM

## 2023-10-03 PROCEDURE — 99204 OFFICE O/P NEW MOD 45 MIN: CPT | Performed by: SURGERY

## 2023-10-03 PROCEDURE — G8484 FLU IMMUNIZE NO ADMIN: HCPCS | Performed by: SURGERY

## 2023-10-03 PROCEDURE — 1036F TOBACCO NON-USER: CPT | Performed by: SURGERY

## 2023-10-03 PROCEDURE — G8428 CUR MEDS NOT DOCUMENT: HCPCS | Performed by: SURGERY

## 2023-10-03 PROCEDURE — G8417 CALC BMI ABV UP PARAM F/U: HCPCS | Performed by: SURGERY

## 2023-10-03 NOTE — PROGRESS NOTES
[facility] Note    Assessment/Plan:      Diagnosis Orders   1. Peptic ulcer disease      Continue PPI, Carafate. Plan for EGD evaluation. 2. Gastroesophageal reflux disease, unspecified whether esophagitis present      Continue PPI, dietary modification            Return for EGD. Chief Complaint   Patient presents with    New Patient     Ref by Dr Ceci Mai for heartburn x 2 wks, severe abdomina pain and vomiting x 1 wk. More constipated than usual       PCP: Izzy Fraire DO    HPI: Mayelin Holt is a 41 y.o. female who presents in consultation for GI issues. She states she used to see GI to have yearly scopes with gastroenterology to [facility] for reflux. \"  She denies ever having dysphagia. She does note having some darker stools have been stickier she states. She denies any NSAID usage. She did recently develop a bad reflux. She is taking Tums over-the-counter medications as well. Is been ongoing last 3 weeks with worsening. She was vomiting in the morning. She was then having what she describes as \"stomach spasms. \"  She went to the ER was given GI cocktail. She will be placed on Protonix and Carafate as well as Zofran as needed. It is helping her reflux but she is still having abdominal pain. She manages alcohol. She does not smoke or use tobacco.  She has been limiting her caffeine. She does not use chocolate. She also denies any spicy foods. Her last colonoscopy 3 years ago. She says they were doing it yearly and they were \"monitoring a whole. 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       Past Medical History:   Diagnosis Date    Arthritis     Asthma     not taking any medications    CHF (congestive heart failure) (HCC)     Deviated nasal septum     History of Chiari malformation     Had initial evaluation by neurologist Dr China Sloan - to return for follow up only as

## 2023-10-04 ENCOUNTER — TELEMEDICINE (OUTPATIENT)
Dept: FAMILY MEDICINE CLINIC | Age: 38
End: 2023-10-04
Payer: OTHER GOVERNMENT

## 2023-10-04 DIAGNOSIS — R32 URINARY INCONTINENCE IN FEMALE: ICD-10-CM

## 2023-10-04 DIAGNOSIS — E66.09 CLASS 1 OBESITY DUE TO EXCESS CALORIES WITHOUT SERIOUS COMORBIDITY WITH BODY MASS INDEX (BMI) OF 34.0 TO 34.9 IN ADULT: ICD-10-CM

## 2023-10-04 DIAGNOSIS — G89.29 CHRONIC BILATERAL LOW BACK PAIN, UNSPECIFIED WHETHER SCIATICA PRESENT: ICD-10-CM

## 2023-10-04 DIAGNOSIS — M54.50 CHRONIC BILATERAL LOW BACK PAIN, UNSPECIFIED WHETHER SCIATICA PRESENT: ICD-10-CM

## 2023-10-04 DIAGNOSIS — R10.84 GENERALIZED ABDOMINAL PAIN: ICD-10-CM

## 2023-10-04 PROCEDURE — G8427 DOCREV CUR MEDS BY ELIG CLIN: HCPCS

## 2023-10-04 PROCEDURE — 99213 OFFICE O/P EST LOW 20 MIN: CPT

## 2023-10-04 ASSESSMENT — ENCOUNTER SYMPTOMS
BACK PAIN: 1
VOMITING: 0
CONSTIPATION: 0
SHORTNESS OF BREATH: 0
DIARRHEA: 0
NAUSEA: 0
ABDOMINAL PAIN: 0

## 2023-10-04 NOTE — PROGRESS NOTES
[address]  Precepting Note    Subjective:  VV  Urinary incontinence and urgency   Stopped anticholinergic due to dry mouth   CBP, did not complete MRI  Has endometriosis  Abdominal pain, follows with GI  Advised to stop GLP 1, pt prefers to cont    ROS otherwise negative     Past medical, surgical, family and social history were reviewed, non-contributory, and unchanged unless otherwise stated. Objective:    LMP 09/15/2023 (Approximate)     Exam is as noted by resident with the following changes, additions or corrections:      Assessment/Plan:  CBP- to complete MRI previously ordered  Urinary incontinence- urology referral   Semaglutide refill      Attending Physician Statement  I have reviewed the chart, including any radiology or labs. I have discussed the case, including pertinent history and exam findings with the resident. I agree with the assessment, plan and orders as documented by the resident. Please refer to the resident note for additional information.       Electronically signed by David Leon MD on 10/4/2023 at 3:04 PM
TeleMedicine Patient Consent    This visit was performed as a virtual video visit using a synchronous, two-way, audio-video telehealth technology platform. Patient identification was verified at the start of the visit, including the patient's telephone number and physical location. I discussed with the patient the nature of our telehealth visits, that:     Due to the nature of an audio- video modality, the only components of a physical exam that could be done are the elements supported by direct observation. The provider will evaluate the patient and recommend diagnostics and treatments based on their assessment. If it was felt that the patient should be evaluated in clinic or an emergency room setting, then they would be directed there. Our sessions are not being recorded and that personal health information is protected. Our team would provide follow up care in person if/when the patient needs it. Patient does agree to proceed with telemedicine consultation. Patient location: home address in [place]    Physician location: regular office location Other people involved in call: Preceptor  This visit was completed virtually using My Chart/Haiku/Evette    This visit was performed during the 5092 public health crisis and COVID-19 pandemic.   *Add 95 modifier to all Video Visits*
tablet Take 1 tablet by mouth 4 times daily 120 tablet 3    ondansetron (ZOFRAN) 8 MG tablet Take 1 tablet by mouth 3 times daily as needed for Nausea or Vomiting 30 tablet 1    ondansetron (ZOFRAN-ODT) 4 MG disintegrating tablet Place 1 tablet under the tongue every 8 hours as needed for Nausea or Vomiting May Sub regular tablet (non-ODT) if insurance does not cover ODT. 20 tablet 0    famotidine (PEPCID) 10 MG tablet Take 2 tablets by mouth 2 times daily 120 tablet 0    VYVANSE 60 MG CAPS Take 1 tablet by mouth daily. vitamin B-2 (RIBOFLAVIN) 100 MG TABS tablet Take 2 tablets by mouth daily 180 tablet 1    magnesium oxide (MAG-OX) 400 MG tablet TAKE 1 TABLET BY MOUTH EVERY DAY 90 tablet 1    albuterol sulfate HFA (PROVENTIL;VENTOLIN;PROAIR) 108 (90 Base) MCG/ACT inhaler INHALE 1 TO 2 PUFFS INTO THE LUNGS EVERY 4 TO 6 HOURS AS NEEDED FOR COUGH OR SHORTNESS OF BREATH      vitamin D (CHOLECALCIFEROL) 125 MCG (5000 UT) CAPS capsule Take 1 capsule by mouth daily      ibuprofen (ADVIL;MOTRIN) 800 MG tablet Take 1 tablet by mouth every 6 hours as needed for Pain       No current facility-administered medications for this visit. Return to Office: Return in about 2 months (around 12/4/2023) for incontinence. This document may have been prepared at least partially through the use of voice recognition software. Although effort is taken to assure the accuracy of this document, it is possible that grammatical, syntax,  or spelling errors may occur.     Rusty Ann DO  Family Medicine Resident, PGY-2  [address]  10/4/2023 5:03 PM

## 2023-10-17 ENCOUNTER — TELEPHONE (OUTPATIENT)
Dept: SURGERY | Age: 38
End: 2023-10-17

## 2023-10-17 PROBLEM — K21.9 GASTROESOPHAGEAL REFLUX DISEASE: Status: ACTIVE | Noted: 2023-10-17

## 2023-10-17 PROBLEM — K27.9 PUD (PEPTIC ULCER DISEASE): Status: ACTIVE | Noted: 2023-10-17

## 2023-10-17 NOTE — TELEPHONE ENCOUNTER
Prior Authorization Form:      DEMOGRAPHICS:                     Patient Name:  Loy Garcia  Patient :  1985            Insurance:  Payor: Kendall Bird / Plan: [place] [address] iGo / Product Type: *No Product type* /   Insurance ID Number:    Payer/Plan Subscr  Sex Relation Sub. Ins. ID Effective Group Num   1. [address] Rachid Roberts R 1985 Female Self [id number]                                   [address]   2.  RONALDROLY COMMU* RASHEED MUNOZ R 1985 Female Self [id number]                                    [address]         DIAGNOSIS & PROCEDURE:                       Procedure/Operation: EGD           CPT Code: 53849    Diagnosis:  GERD    ICD10 Code: K21.9    [facility]:  [facility]    Surgeon:  Dr Flores Manual INFORMATION:                          Date: 23    Time: 9:30 am              [facility]                                                       Status:  Outpatient        Special Comments:         Electronically signed by Anthony Katz MA on 10/17/2023 at 6:59 AM

## 2023-10-17 NOTE — TELEPHONE ENCOUNTER
Caren Carroll is scheduled for EGD with Dr Wing hTomas on 11-24-23 at [facility]. Patient needs to be NPO after midnight the night before procedure. All surgery instructions were explained to the patient and a surgery letter was also mailed out. MA informed patient that PAT will also be calling to review pre-op instructions and medications. Patient verbalized understanding.   Electronically signed by Rand Pond MA on 10/17/2023 at 6:59 AM

## 2023-10-20 DIAGNOSIS — R11.2 NAUSEA AND VOMITING, UNSPECIFIED VOMITING TYPE: ICD-10-CM

## 2023-10-20 DIAGNOSIS — R10.84 GENERALIZED ABDOMINAL PAIN: ICD-10-CM

## 2023-10-20 RX ORDER — PANTOPRAZOLE SODIUM 40 MG/1
40 TABLET, DELAYED RELEASE ORAL
Qty: 90 TABLET | Refills: 0 | Status: SHIPPED | OUTPATIENT
Start: 2023-10-20

## 2023-10-20 RX ORDER — SUCRALFATE 1 G/1
1 TABLET ORAL 4 TIMES DAILY
Qty: 120 TABLET | Refills: 2 | Status: SHIPPED | OUTPATIENT
Start: 2023-10-20

## 2023-11-08 ENCOUNTER — PATIENT MESSAGE (OUTPATIENT)
Dept: FAMILY MEDICINE CLINIC | Age: 38
End: 2023-11-08

## 2023-11-09 ENCOUNTER — TELEPHONE (OUTPATIENT)
Dept: FAMILY MEDICINE CLINIC | Age: 38
End: 2023-11-09

## 2023-11-09 NOTE — TELEPHONE ENCOUNTER
From: James Cevallos  To: Dinora Tony DO  Sent: 11/8/2023 6:18 PM EST  Subject: Flu Vaccine Exemption Form     Hello, please see attached form, if you would please fill out the provider section of the form and Return to me at your earliest convenience I would appreciate it. Thank you!

## 2023-11-09 NOTE — TELEPHONE ENCOUNTER
Regarding: Flu Vaccine Exemption Form    Contact: [phone]  ----- Message from Chloe Huma sent at 11/9/2023  8:56 AM EST -----       ----- Message sent from Chloe Huma to Lor Demarco at 11/9/2023  8:56 AM -----   Thank you for using [employer]. We have received your message and are currently addressing your request. You will receive a reply within 1 business day. Thank you.       ----- Message -----       Abbie Freitas       Sent:11/8/2023  6:18 PM EST         To:Bailee Toledo, DO    Subject:Flu Vaccine Exemption Form      Hello, please see attached form, if you would please fill out the provider section of the form and Return to me at your earliest convenience I would appreciate it. Thank you!

## 2023-11-09 NOTE — TELEPHONE ENCOUNTER
Please have the patient fill out the form and then I will address it appropriately. Please ask her what her contraindication to the influenza vaccine is.     Shailesh Dunham, DO  Family Medicine Resident, PGY-2  [facility]  11/9/2023 9:28 AM

## 2023-11-20 NOTE — PROGRESS NOTES
[address] [facility] PRE-ADMISSION TESTING INSTRUCTIONS    The Preadmission Testing patient is instructed accordingly using the following criteria (check applicable):    ARRIVAL INSTRUCTIONS:  [x] Parking the day of Surgery is located in the Main Entrance lot. Upon entering the door, make an immediate right to the surgery reception desk    [x] Bring photo ID and insurance card    [x] Bring in a copy of Living will or Durable Power of  papers. [x] Please be sure to arrange for responsible adult to provide transportation to and from the hospital    [x] Please arrange for responsible adult to be with you for the 24 hour period post procedure due to having anesthesia    [x] If you awake am of surgery not feeling well or have temperature >100 please call [phone]    GENERAL INSTRUCTIONS:    [x] Nothing by mouth after midnight, including gum, candy, mints or water    [x] You may brush your teeth, but do not swallow any water    [x] Take medications as instructed with 1-2 oz of water    [x] Stop herbal supplements and vitamins 5 days prior to procedure    [] Follow preop dosing of blood thinners per physician instructions    [] Take 1/2 dose of evening insulin, but no insulin after midnight    [] No oral diabetic medications after midnight    [] If diabetic and have low blood sugar or feel symptomatic, take 1-2oz apple juice only    [] Bring inhalers day of surgery    [] Bring C-PAP/ Bi-Pap day of surgery    [x] Bring urine specimen day of surgery    [x] Shower or bath with soap, lather and rinse well, AM of Surgery, no lotion, powders or creams to surgical site    [] Follow bowel prep as instructed per surgeon    [] No tobacco products within 24 hours of surgery     [x] No alcohol or illegal drug use within 24 hours of surgery.     [x] Jewelry, body piercing's, eyeglasses, contact lenses and dentures are not permitted into surgery (bring cases)      [x] Please do not wear any nail polish,

## 2023-11-24 ENCOUNTER — ANESTHESIA EVENT (OUTPATIENT)
Dept: ENDOSCOPY | Age: 38
End: 2023-11-24
Payer: OTHER GOVERNMENT

## 2023-11-24 ENCOUNTER — HOSPITAL ENCOUNTER (OUTPATIENT)
Age: 38
Setting detail: OUTPATIENT SURGERY
Discharge: HOME | End: 2023-11-24
Attending: SURGERY | Admitting: SURGERY
Payer: OTHER GOVERNMENT

## 2023-11-24 ENCOUNTER — ANESTHESIA (OUTPATIENT)
Dept: ENDOSCOPY | Age: 38
End: 2023-11-24
Payer: OTHER GOVERNMENT

## 2023-11-24 VITALS
RESPIRATION RATE: 16 BRPM | HEIGHT: 69 IN | SYSTOLIC BLOOD PRESSURE: 112 MMHG | OXYGEN SATURATION: 99 % | HEART RATE: 72 BPM | WEIGHT: 223 LBS | DIASTOLIC BLOOD PRESSURE: 67 MMHG | TEMPERATURE: 97.2 F | BODY MASS INDEX: 33.03 KG/M2

## 2023-11-24 DIAGNOSIS — K27.9 PUD (PEPTIC ULCER DISEASE): ICD-10-CM

## 2023-11-24 DIAGNOSIS — K21.9 GASTROESOPHAGEAL REFLUX DISEASE: ICD-10-CM

## 2023-11-24 LAB
HCG, URINE, POC: NEGATIVE
Lab: NORMAL
NEGATIVE QC PASS/FAIL: NORMAL
POSITIVE QC PASS/FAIL: NORMAL

## 2023-11-24 PROCEDURE — 7100000011 HC PHASE II RECOVERY - ADDTL 15 MIN: Performed by: SURGERY

## 2023-11-24 PROCEDURE — 3700000000 HC ANESTHESIA ATTENDED CARE: Performed by: SURGERY

## 2023-11-24 PROCEDURE — 2580000003 HC RX 258: Performed by: NURSE ANESTHETIST, CERTIFIED REGISTERED

## 2023-11-24 PROCEDURE — 43239 EGD BIOPSY SINGLE/MULTIPLE: CPT | Performed by: SURGERY

## 2023-11-24 PROCEDURE — 2709999900 HC NON-CHARGEABLE SUPPLY: Performed by: SURGERY

## 2023-11-24 PROCEDURE — 88305 TISSUE EXAM BY PATHOLOGIST: CPT

## 2023-11-24 PROCEDURE — 3700000001 HC ADD 15 MINUTES (ANESTHESIA): Performed by: SURGERY

## 2023-11-24 PROCEDURE — 6360000002 HC RX W HCPCS: Performed by: NURSE ANESTHETIST, CERTIFIED REGISTERED

## 2023-11-24 PROCEDURE — 3609012400 HC EGD TRANSORAL BIOPSY SINGLE/MULTIPLE: Performed by: SURGERY

## 2023-11-24 PROCEDURE — 7100000010 HC PHASE II RECOVERY - FIRST 15 MIN: Performed by: SURGERY

## 2023-11-24 PROCEDURE — 88342 IMHCHEM/IMCYTCHM 1ST ANTB: CPT

## 2023-11-24 RX ORDER — PROPOFOL 10 MG/ML
INJECTION, EMULSION INTRAVENOUS PRN
Status: DISCONTINUED | OUTPATIENT
Start: 2023-11-24 | End: 2023-11-24 | Stop reason: SDUPTHER

## 2023-11-24 RX ORDER — SODIUM CHLORIDE 9 MG/ML
INJECTION, SOLUTION INTRAVENOUS CONTINUOUS PRN
Status: DISCONTINUED | OUTPATIENT
Start: 2023-11-24 | End: 2023-11-24 | Stop reason: SDUPTHER

## 2023-11-24 RX ADMIN — PROPOFOL 250 MG: 10 INJECTION, EMULSION INTRAVENOUS at 08:30

## 2023-11-24 RX ADMIN — SODIUM CHLORIDE: 9 INJECTION, SOLUTION INTRAVENOUS at 08:20

## 2023-11-24 ASSESSMENT — LIFESTYLE VARIABLES: SMOKING_STATUS: 0

## 2023-11-24 ASSESSMENT — PAIN - FUNCTIONAL ASSESSMENT: PAIN_FUNCTIONAL_ASSESSMENT: 0-10

## 2023-11-24 ASSESSMENT — PAIN SCALES - GENERAL
PAINLEVEL_OUTOF10: 0
PAINLEVEL_OUTOF10: 0

## 2023-11-24 NOTE — H&P
[facility] Note     Assessment/Plan:        Diagnosis Orders   1. Peptic ulcer disease        Continue PPI, Carafate. Plan for EGD evaluation. 2. Gastroesophageal reflux disease, unspecified whether esophagitis present        Continue PPI, dietary modification                Return for EGD. Chief Complaint   Patient presents with    New Patient       Ref by Dr Crystal Lagunas for heartburn x 2 wks, severe abdomina pain and vomiting x 1 wk. More constipated than usual         PCP: Brian Mercedes, DO     HPI: Dalton Batista is a 41 y.o. female who presents in consultation for GI issues. She states she used to see GI to have yearly scopes with gastroenterology to [facility] for reflux. \"  She denies ever having dysphagia. She does note having some darker stools have been stickier she states. She denies any NSAID usage. She did recently develop a bad reflux. She is taking Tums over-the-counter medications as well. Is been ongoing last 3 weeks with worsening. She was vomiting in the morning. She was then having what she describes as \"stomach spasms. \"  She went to the ER was given GI cocktail. She will be placed on Protonix and Carafate as well as Zofran as needed. It is helping her reflux but she is still having abdominal pain. She manages alcohol. She does not smoke or use tobacco.  She has been limiting her caffeine. She does not use chocolate. She also denies any spicy foods. Her last colonoscopy 3 years ago. She says they were doing it yearly and they were \"monitoring a whole. 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         Past Medical History        Past Medical History:   Diagnosis Date    Arthritis      Asthma       not taking any medications    CHF (congestive heart failure) (HCC)      Deviated nasal septum      History of Chiari malformation       Had initial evaluation

## 2023-11-24 NOTE — ANESTHESIA POSTPROCEDURE EVALUATION
Department of Anesthesiology  Postprocedure Note    Patient: Caitie Rashid  MRN: [id number]  YOB: 1985  Date of evaluation: 11/24/2023      Procedure Summary     Date: 11/24/23 Room / Location: [facility] ENDO 02 / [facility]    Anesthesia Start: 9594 Anesthesia Stop: 8589    Procedure: EGD BIOPSY Diagnosis:       PUD (peptic ulcer disease)      Gastroesophageal reflux disease      (PUD (peptic ulcer disease) [K27.9])      (Gastroesophageal reflux disease [K21.9])    Surgeons: Caron Cartagena MD Responsible Provider: Annemarie Grande MD    Anesthesia Type: MAC ASA Status: 3          Anesthesia Type: No value filed.     Mohini Phase I: Mohini Score: 10    Mohini Phase II: Mohini Score: 10      Anesthesia Post Evaluation    Patient location during evaluation: bedside  Patient participation: complete - patient participated  Level of consciousness: awake  Pain score: 0  Airway patency: patent  Nausea & Vomiting: no nausea and no vomiting  Complications: no  Cardiovascular status: hemodynamically stable  Respiratory status: acceptable  Hydration status: euvolemic  Pain management: adequate

## 2023-11-29 LAB — SURGICAL PATHOLOGY REPORT: NORMAL

## 2023-12-12 ENCOUNTER — OFFICE VISIT (OUTPATIENT)
Dept: SURGERY | Age: 38
End: 2023-12-12
Payer: OTHER GOVERNMENT

## 2023-12-12 VITALS
HEIGHT: 69 IN | WEIGHT: 233 LBS | HEART RATE: 86 BPM | DIASTOLIC BLOOD PRESSURE: 69 MMHG | TEMPERATURE: 97.3 F | SYSTOLIC BLOOD PRESSURE: 115 MMHG | BODY MASS INDEX: 34.51 KG/M2

## 2023-12-12 DIAGNOSIS — K21.00 GASTROESOPHAGEAL REFLUX DISEASE WITH ESOPHAGITIS WITHOUT HEMORRHAGE: Primary | ICD-10-CM

## 2023-12-12 DIAGNOSIS — Q45.3 PANCREATIC RESTS IN STOMACH: ICD-10-CM

## 2023-12-12 DIAGNOSIS — K29.70 GASTRITIS WITHOUT BLEEDING, UNSPECIFIED CHRONICITY, UNSPECIFIED GASTRITIS TYPE: ICD-10-CM

## 2023-12-12 DIAGNOSIS — K44.9 HIATAL HERNIA: ICD-10-CM

## 2023-12-12 PROCEDURE — 1036F TOBACCO NON-USER: CPT | Performed by: SURGERY

## 2023-12-12 PROCEDURE — G8417 CALC BMI ABV UP PARAM F/U: HCPCS | Performed by: SURGERY

## 2023-12-12 PROCEDURE — G8427 DOCREV CUR MEDS BY ELIG CLIN: HCPCS | Performed by: SURGERY

## 2023-12-12 PROCEDURE — G8484 FLU IMMUNIZE NO ADMIN: HCPCS | Performed by: SURGERY

## 2023-12-12 PROCEDURE — 99214 OFFICE O/P EST MOD 30 MIN: CPT | Performed by: SURGERY

## 2023-12-12 NOTE — PROGRESS NOTES
[facility] Note    Assessment/Plan:     Diagnosis Orders   1. Gastroesophageal reflux disease with esophagitis without hemorrhage      Symptoms improved. Continue PPI. Dietary modification. 2. Gastritis without bleeding, unspecified chronicity, unspecified gastritis type      Improved with PPI. Continue. Can stop Carafate. 3. Hiatal hernia      Small. Dietary modification and PPI      4. 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 Monitor. Chief Complaint   Patient presents with    Follow-up     egd       PCP: Gary Werner DO    HPI: Verenice Jacinto is a 41 y.o. female here for follow-up of EGD for PUD. She is overall feeling better. Her melena has resolved. She denies any current abdominal pain. Her EGD showed small sliding hiatal hernia. She had gastritis. She also noted to have grade a reflux esophagitis. Pancreatic rest was also noted. Duodenal biopsies were negative for sprue. While she is on her PPI she has not had any further emesis. Review of Systems   All other systems reviewed and are negative.         Past Medical History:   Diagnosis Date    Abdominal pain     Arthritis     Asthma     not taking any medications    Deviated nasal septum     GERD (gastroesophageal reflux disease)     History of Chiari malformation     Had initial evaluation by neurologist Dr Landeros Begin - to return for follow up only as needed       Past Surgical History:   Procedure Laterality Date    CHOLECYSTECTOMY, LAPAROSCOPIC  2010    COLONOSCOPY      LAPAROSCOPY      Ex-lap, abdominal, removed implanted IUD    LEEP  07/2022    Dr. Laverne Azul    GERSON STEROTACTIC LOC BREAST BIOPSY RIGHT Right 04/05/2021    GERSON STEROTACTIC LOC BREAST BIOPSY RIGHT 4/5/2021 [facility] ABDU BCC    OTHER SURGICAL HISTORY Right 05/02/2016    tonsil mass incision    SEPTOPLASTY N/A 3/14/2023    SEPTOPLASTY SUBMUCOUSAL RESECTION INFERIOR NASAL TURBINATES performed by Rica Wilson DO at [address] [place] [address]

## 2023-12-15 DIAGNOSIS — E66.09 CLASS 1 OBESITY DUE TO EXCESS CALORIES WITHOUT SERIOUS COMORBIDITY WITH BODY MASS INDEX (BMI) OF 34.0 TO 34.9 IN ADULT: ICD-10-CM

## 2023-12-15 NOTE — PROGRESS NOTES
Her notification sent from [facility] pharmacy, refilled patient's semaglutide.  New prescription sent.    Bailee Dowd DO  Family Medicine Resident, PGY-2  [facility]  12/15/2023 2:11 PM

## 2023-12-20 PROBLEM — K20.90 ESOPHAGITIS DETERMINED BY BIOPSY: Status: ACTIVE | Noted: 2023-12-20

## 2024-01-15 ENCOUNTER — HOSPITAL ENCOUNTER (OUTPATIENT)
Dept: CT IMAGING | Age: 39
Discharge: HOME | End: 2024-01-17
Payer: OTHER GOVERNMENT

## 2024-01-15 ENCOUNTER — OFFICE VISIT (OUTPATIENT)
Dept: FAMILY MEDICINE CLINIC | Age: 39
End: 2024-01-15
Payer: OTHER GOVERNMENT

## 2024-01-15 VITALS
HEART RATE: 103 BPM | SYSTOLIC BLOOD PRESSURE: 121 MMHG | OXYGEN SATURATION: 98 % | WEIGHT: 234 LBS | TEMPERATURE: 98.2 F | BODY MASS INDEX: 34.66 KG/M2 | RESPIRATION RATE: 18 BRPM | DIASTOLIC BLOOD PRESSURE: 78 MMHG | HEIGHT: 69 IN
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
## 2024-01-15 DIAGNOSIS — S13.4XXA NECK PAIN WITH TENDERNESS OF NECK AFTER WHIPLASH INJURY TO NECK: ICD-10-CM

## 2024-01-15 DIAGNOSIS — R51.9 ACUTE NONINTRACTABLE HEADACHE, UNSPECIFIED HEADACHE TYPE: ICD-10-CM
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
## 2024-01-15 PROCEDURE — 99214 OFFICE O/P EST MOD 30 MIN: CPT

## 2024-01-15 PROCEDURE — G8484 FLU IMMUNIZE NO ADMIN: HCPCS

## 2024-01-15 PROCEDURE — 1036F TOBACCO NON-USER: CPT

## 2024-01-15 PROCEDURE — G8417 CALC BMI ABV UP PARAM F/U: HCPCS

## 2024-01-15 PROCEDURE — G8428 CUR MEDS NOT DOCUMENT: HCPCS

## 2024-01-15 PROCEDURE — 72125 CT NECK SPINE W/O DYE: CPT

## 2024-01-15 PROCEDURE — 70450 CT HEAD/BRAIN W/O DYE: CPT

## 2024-01-15 RX ORDER — LISDEXAMFETAMINE DIMESYLATE CAPSULES 40 MG/1
1 CAPSULE ORAL DAILY
COMMUNITY
Start: 2023-11-17

## 2024-01-15 RX ORDER — IBUPROFEN 600 MG/1
600 TABLET ORAL EVERY 6 HOURS PRN
Qty: 30 TABLET | Refills: 0 | Status: SHIPPED | OUTPATIENT
Start: 2024-01-15

## 2024-01-15 RX ORDER — CYCLOBENZAPRINE HCL 10 MG
10 TABLET ORAL 3 TIMES DAILY PRN
Qty: 30 TABLET | Refills: 0 | Status: SHIPPED | OUTPATIENT
Start: 2024-01-15 | End: 2024-01-25

## 2024-01-15 ASSESSMENT — ENCOUNTER SYMPTOMS
COUGH: 0
TROUBLE SWALLOWING: 1
BACK PAIN: 1
ABDOMINAL PAIN: 0
VOMITING: 0
SORE THROAT: 0
SHORTNESS OF BREATH: 0
NAUSEA: 0
CHEST TIGHTNESS: 0
CONSTIPATION: 0
RHINORRHEA: 0
DIARRHEA: 0

## 2024-01-15 ASSESSMENT — PATIENT HEALTH QUESTIONNAIRE - PHQ9
3. TROUBLE FALLING OR STAYING ASLEEP: 0
SUM OF ALL RESPONSES TO PHQ QUESTIONS 1-9: 0
4. FEELING TIRED OR HAVING LITTLE ENERGY: 0
SUM OF ALL RESPONSES TO PHQ QUESTIONS 1-9: 0
5. POOR APPETITE OR OVEREATING: 0
SUM OF ALL RESPONSES TO PHQ QUESTIONS 1-9: 0
10. IF YOU CHECKED OFF ANY PROBLEMS, HOW DIFFICULT HAVE THESE PROBLEMS MADE IT FOR YOU TO DO YOUR WORK, TAKE CARE OF THINGS AT HOME, OR GET ALONG WITH OTHER PEOPLE: 0
SUM OF ALL RESPONSES TO PHQ9 QUESTIONS 1 & 2: 0
7. TROUBLE CONCENTRATING ON THINGS, SUCH AS READING THE NEWSPAPER OR WATCHING TELEVISION: 0
6. FEELING BAD ABOUT YOURSELF - OR THAT YOU ARE A FAILURE OR HAVE LET YOURSELF OR YOUR FAMILY DOWN: 0
SUM OF ALL RESPONSES TO PHQ QUESTIONS 1-9: 0
2. FEELING DOWN, DEPRESSED OR HOPELESS: 0
9. THOUGHTS THAT YOU WOULD BE BETTER OFF DEAD, OR OF HURTING YOURSELF: 0
8. MOVING OR SPEAKING SO SLOWLY THAT OTHER PEOPLE COULD HAVE NOTICED. OR THE OPPOSITE, BEING SO FIGETY OR RESTLESS THAT YOU HAVE BEEN MOVING AROUND A LOT MORE THAN USUAL: 0
1. LITTLE INTEREST OR PLEASURE IN DOING THINGS: 0
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
## 2024-01-16 DIAGNOSIS — R51.9 DAILY HEADACHE: ICD-10-CM

## 2024-01-16 DIAGNOSIS — E66.09 CLASS 1 OBESITY DUE TO EXCESS CALORIES WITHOUT SERIOUS COMORBIDITY WITH BODY MASS INDEX (BMI) OF 34.0 TO 34.9 IN ADULT: ICD-10-CM

## 2024-01-16 RX ORDER — MAGNESIUM OXIDE 400 MG/1
TABLET ORAL
Qty: 90 TABLET | Refills: 1 | Status: SHIPPED | OUTPATIENT
Start: 2024-01-16

## 2024-01-16 RX ORDER — RIBOFLAVIN (VITAMIN B2) 100 MG
200 TABLET ORAL DAILY
Qty: 180 TABLET | Refills: 1 | Status: SHIPPED | OUTPATIENT
Start: 2024-01-16

## 2024-01-16 NOTE — TELEPHONE ENCOUNTER
Patient would like to increase the Trulicity dosage. She states she has handled the current dosage well and would like to increase.
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
## 2024-01-19 ENCOUNTER — HOSPITAL ENCOUNTER (OUTPATIENT)
Dept: MRI IMAGING | Age: 39
Discharge: HOME | End: 2024-01-19
Attending: FAMILY MEDICINE
Payer: OTHER GOVERNMENT

## 2024-01-19 DIAGNOSIS — M79.605 LUMBAR PAIN WITH RADIATION DOWN BOTH LEGS: ICD-10-CM

## 2024-01-19 DIAGNOSIS — M79.604 LUMBAR PAIN WITH RADIATION DOWN BOTH LEGS: ICD-10-CM

## 2024-01-19 DIAGNOSIS — M54.50 LUMBAR PAIN WITH RADIATION DOWN BOTH LEGS: ICD-10-CM

## 2024-01-19 DIAGNOSIS — N39.42 URINARY INCONTINENCE WITHOUT SENSORY AWARENESS: ICD-10-CM

## 2024-01-19 PROCEDURE — 72148 MRI LUMBAR SPINE W/O DYE: CPT

## 2024-01-22 ENCOUNTER — TELEPHONE (OUTPATIENT)
Dept: FAMILY MEDICINE CLINIC | Age: 39
End: 2024-01-22

## 2024-01-22 DIAGNOSIS — M79.604 LUMBAR PAIN WITH RADIATION DOWN BOTH LEGS: ICD-10-CM

## 2024-01-22 DIAGNOSIS — M79.605 LUMBAR PAIN WITH RADIATION DOWN BOTH LEGS: ICD-10-CM

## 2024-01-22 DIAGNOSIS — M54.50 LUMBAR PAIN WITH RADIATION DOWN BOTH LEGS: ICD-10-CM

## 2024-01-22 DIAGNOSIS — S13.4XXA NECK PAIN WITH TENDERNESS OF NECK AFTER WHIPLASH INJURY TO NECK: Primary | ICD-10-CM
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
## 2024-02-16 ENCOUNTER — TELEPHONE (OUTPATIENT)
Dept: FAMILY MEDICINE CLINIC | Age: 39
End: 2024-02-16

## 2024-02-16 NOTE — TELEPHONE ENCOUNTER
Regarding: Sinus infection  Contact: [phone]  ----- Message from Abundio Causey LPN sent at 2/16/2024  8:54 AM EST -----       ----- Message sent from Abundio Causye LPN to Keysha Martinez at 2/16/2024  8:54 AM -----   Thank you for using [employer].  We have received your message and are currently addressing your request. You will receive a reply within 1 business day.  Thank you.        ----- Message -----       From:Keysha Martinez       Sent:2/16/2024  8:16 AM EST         To:Bailee Dowd,     Subject:Sinus infection    Good morning, I have been battling a cold the last few days which I believe has turned into a sinus infection. Am I able to request antibiotics to help alleviate the congestion/yellow mucus or do I need to be seen first? If so, are there any appointments available today?

## 2024-02-16 NOTE — TELEPHONE ENCOUNTER
Please let the patient know she would need to be seen if she believes her symptoms warrant an antibiotic.     Bailee Dowd DO  Family Medicine Resident, PGY-2  [facility]  2/16/2024 11:18 AM

## 2024-02-25 ENCOUNTER — HOSPITAL ENCOUNTER (EMERGENCY)
Age: 39
Discharge: HOME | End: 2024-02-25
Attending: EMERGENCY MEDICINE
Payer: OTHER GOVERNMENT

## 2024-02-25 ENCOUNTER — APPOINTMENT (OUTPATIENT)
Dept: CT IMAGING | Age: 39
End: 2024-02-25
Payer: OTHER GOVERNMENT

## 2024-02-25 VITALS
HEIGHT: 69 IN | OXYGEN SATURATION: 97 % | BODY MASS INDEX: 32.58 KG/M2 | DIASTOLIC BLOOD PRESSURE: 83 MMHG | SYSTOLIC BLOOD PRESSURE: 117 MMHG | RESPIRATION RATE: 19 BRPM | WEIGHT: 220 LBS | HEART RATE: 94 BPM | TEMPERATURE: 97.9 F

## 2024-02-25 DIAGNOSIS — R11.2 NAUSEA VOMITING AND DIARRHEA: Primary | ICD-10-CM

## 2024-02-25 DIAGNOSIS — N17.9 AKI (ACUTE KIDNEY INJURY) (HCC): ICD-10-CM

## 2024-02-25 DIAGNOSIS — R10.9 ABDOMINAL PAIN, UNSPECIFIED ABDOMINAL LOCATION: ICD-10-CM

## 2024-02-25 DIAGNOSIS — R19.7 NAUSEA VOMITING AND DIARRHEA: Primary | ICD-10-CM

## 2024-02-25 LAB
ALBUMIN SERPL-MCNC: 4.5 G/DL (ref 3.5–5.2)
ALP SERPL-CCNC: 70 U/L (ref 35–104)
ALT SERPL-CCNC: 12 U/L (ref 0–32)
ANION GAP SERPL CALCULATED.3IONS-SCNC: 13 MMOL/L (ref 7–16)
AST SERPL-CCNC: 14 U/L (ref 0–31)
BASOPHILS # BLD: 0.03 K/UL (ref 0–0.2)
BASOPHILS NFR BLD: 0 % (ref 0–2)
BILIRUB SERPL-MCNC: 0.3 MG/DL (ref 0–1.2)
BILIRUB UR QL STRIP: ABNORMAL
BUN SERPL-MCNC: 14 MG/DL (ref 6–20)
CALCIUM SERPL-MCNC: 10 MG/DL (ref 8.6–10.2)
CHLORIDE SERPL-SCNC: 102 MMOL/L (ref 98–107)
CLARITY UR: ABNORMAL
CO2 SERPL-SCNC: 22 MMOL/L (ref 22–29)
COLOR UR: YELLOW
CREAT SERPL-MCNC: 1.1 MG/DL (ref 0.5–1)
EOSINOPHIL # BLD: 0.51 K/UL (ref 0.05–0.5)
EOSINOPHILS RELATIVE PERCENT: 3 % (ref 0–6)
ERYTHROCYTE [DISTWIDTH] IN BLOOD BY AUTOMATED COUNT: 11.9 % (ref 11.5–15)
ERYTHROCYTE [DISTWIDTH] IN BLOOD BY AUTOMATED COUNT: 11.9 % (ref 11.5–15)
GFR SERPL CREATININE-BSD FRML MDRD: >60 ML/MIN/1.73M2
GLUCOSE SERPL-MCNC: 120 MG/DL (ref 74–99)
GLUCOSE UR STRIP-MCNC: NEGATIVE MG/DL
HCG, URINE, POC: NEGATIVE
HCT VFR BLD AUTO: 42.9 % (ref 34–48)
HCT VFR BLD AUTO: 53.1 % (ref 34–48)
HGB BLD-MCNC: 14.4 G/DL (ref 11.5–15.5)
HGB BLD-MCNC: 18.4 G/DL (ref 11.5–15.5)
HGB UR QL STRIP.AUTO: ABNORMAL
IMM GRANULOCYTES # BLD AUTO: 0.09 K/UL (ref 0–0.58)
IMM GRANULOCYTES NFR BLD: 1 % (ref 0–5)
KETONES UR STRIP-MCNC: NEGATIVE MG/DL
LACTATE BLDV-SCNC: 1.9 MMOL/L (ref 0.5–2.2)
LEUKOCYTE ESTERASE UR QL STRIP: NEGATIVE
LIPASE SERPL-CCNC: 13 U/L (ref 13–60)
LYMPHOCYTES NFR BLD: 3.79 K/UL (ref 1.5–4)
LYMPHOCYTES RELATIVE PERCENT: 19 % (ref 20–42)
Lab: NORMAL
MAGNESIUM SERPL-MCNC: 1.9 MG/DL (ref 1.6–2.6)
MCH RBC QN AUTO: 30.6 PG (ref 26–35)
MCH RBC QN AUTO: 30.7 PG (ref 26–35)
MCHC RBC AUTO-ENTMCNC: 33.6 G/DL (ref 32–34.5)
MCHC RBC AUTO-ENTMCNC: 34.7 G/DL (ref 32–34.5)
MCV RBC AUTO: 88.5 FL (ref 80–99.9)
MCV RBC AUTO: 91.3 FL (ref 80–99.9)
MONOCYTES NFR BLD: 1 K/UL (ref 0.1–0.95)
MONOCYTES NFR BLD: 5 % (ref 2–12)
NEGATIVE QC PASS/FAIL: NORMAL
NEUTROPHILS NFR BLD: 72 % (ref 43–80)
NEUTS SEG NFR BLD: 14.14 K/UL (ref 1.8–7.3)
NITRITE UR QL STRIP: NEGATIVE
PH UR STRIP: 6 [PH] (ref 5–9)
PLATELET # BLD AUTO: 386 K/UL (ref 130–450)
PLATELET # BLD AUTO: 558 K/UL (ref 130–450)
PMV BLD AUTO: 10 FL (ref 7–12)
PMV BLD AUTO: 9.9 FL (ref 7–12)
POSITIVE QC PASS/FAIL: NORMAL
POTASSIUM SERPL-SCNC: 3.7 MMOL/L (ref 3.5–5)
PROT SERPL-MCNC: 7.9 G/DL (ref 6.4–8.3)
PROT UR STRIP-MCNC: 100 MG/DL
RBC # BLD AUTO: 4.7 M/UL (ref 3.5–5.5)
RBC # BLD AUTO: 6 M/UL (ref 3.5–5.5)
RBC #/AREA URNS HPF: ABNORMAL /HPF
SODIUM SERPL-SCNC: 137 MMOL/L (ref 132–146)
SP GR UR STRIP: >1.03 (ref 1–1.03)
UROBILINOGEN UR STRIP-ACNC: 0.2 EU/DL (ref 0–1)
WBC #/AREA URNS HPF: ABNORMAL /HPF
WBC OTHER # BLD: 17 K/UL (ref 4.5–11.5)
WBC OTHER # BLD: 19.6 K/UL (ref 4.5–11.5)

## 2024-02-25 PROCEDURE — 83605 ASSAY OF LACTIC ACID: CPT

## 2024-02-25 PROCEDURE — 93005 ELECTROCARDIOGRAM TRACING: CPT | Performed by: NURSE PRACTITIONER

## 2024-02-25 PROCEDURE — 85025 COMPLETE CBC W/AUTO DIFF WBC: CPT

## 2024-02-25 PROCEDURE — 83690 ASSAY OF LIPASE: CPT

## 2024-02-25 PROCEDURE — 6360000002 HC RX W HCPCS

## 2024-02-25 PROCEDURE — 99285 EMERGENCY DEPT VISIT HI MDM: CPT

## 2024-02-25 PROCEDURE — 85027 COMPLETE CBC AUTOMATED: CPT

## 2024-02-25 PROCEDURE — 6360000004 HC RX CONTRAST MEDICATION: Performed by: RADIOLOGY

## 2024-02-25 PROCEDURE — 96375 TX/PRO/DX INJ NEW DRUG ADDON: CPT

## 2024-02-25 PROCEDURE — 2580000003 HC RX 258

## 2024-02-25 PROCEDURE — 81001 URINALYSIS AUTO W/SCOPE: CPT

## 2024-02-25 PROCEDURE — 80053 COMPREHEN METABOLIC PANEL: CPT

## 2024-02-25 PROCEDURE — 96374 THER/PROPH/DIAG INJ IV PUSH: CPT

## 2024-02-25 PROCEDURE — 96376 TX/PRO/DX INJ SAME DRUG ADON: CPT

## 2024-02-25 PROCEDURE — 83735 ASSAY OF MAGNESIUM: CPT

## 2024-02-25 PROCEDURE — 74177 CT ABD & PELVIS W/CONTRAST: CPT

## 2024-02-25 RX ORDER — ONDANSETRON 4 MG/1
4 TABLET, ORALLY DISINTEGRATING ORAL 3 TIMES DAILY PRN
Qty: 12 TABLET | Refills: 0 | Status: SHIPPED | OUTPATIENT
Start: 2024-02-25 | End: 2024-02-29

## 2024-02-25 RX ORDER — 0.9 % SODIUM CHLORIDE 0.9 %
1000 INTRAVENOUS SOLUTION INTRAVENOUS ONCE
Status: COMPLETED | OUTPATIENT
Start: 2024-02-25 | End: 2024-02-25

## 2024-02-25 RX ORDER — FENTANYL CITRATE 50 UG/ML
50 INJECTION, SOLUTION INTRAMUSCULAR; INTRAVENOUS ONCE
Status: DISCONTINUED | OUTPATIENT
Start: 2024-02-25 | End: 2024-02-25

## 2024-02-25 RX ORDER — PROCHLORPERAZINE EDISYLATE 5 MG/ML
10 INJECTION INTRAMUSCULAR; INTRAVENOUS ONCE
Status: COMPLETED | OUTPATIENT
Start: 2024-02-25 | End: 2024-02-25

## 2024-02-25 RX ORDER — ONDANSETRON 2 MG/ML
4 INJECTION INTRAMUSCULAR; INTRAVENOUS ONCE
Status: COMPLETED | OUTPATIENT
Start: 2024-02-25 | End: 2024-02-25

## 2024-02-25 RX ORDER — DIPHENHYDRAMINE HYDROCHLORIDE 50 MG/ML
25 INJECTION INTRAMUSCULAR; INTRAVENOUS ONCE
Status: COMPLETED | OUTPATIENT
Start: 2024-02-25 | End: 2024-02-25

## 2024-02-25 RX ADMIN — PROCHLORPERAZINE EDISYLATE 10 MG: 5 INJECTION INTRAMUSCULAR; INTRAVENOUS at 22:45

## 2024-02-25 RX ADMIN — ONDANSETRON 4 MG: 2 INJECTION INTRAMUSCULAR; INTRAVENOUS at 17:11

## 2024-02-25 RX ADMIN — SODIUM CHLORIDE 1000 ML: 9 INJECTION, SOLUTION INTRAVENOUS at 17:11

## 2024-02-25 RX ADMIN — ONDANSETRON 4 MG: 2 INJECTION INTRAMUSCULAR; INTRAVENOUS at 20:30

## 2024-02-25 RX ADMIN — DIPHENHYDRAMINE HYDROCHLORIDE 25 MG: 50 INJECTION INTRAMUSCULAR; INTRAVENOUS at 22:47

## 2024-02-25 RX ADMIN — IOPAMIDOL 75 ML: 755 INJECTION, SOLUTION INTRAVENOUS at 19:01

## 2024-02-25 RX ADMIN — SODIUM CHLORIDE 1000 ML: 9 INJECTION, SOLUTION INTRAVENOUS at 17:57

## 2024-02-25 ASSESSMENT — PAIN - FUNCTIONAL ASSESSMENT
PAIN_FUNCTIONAL_ASSESSMENT: 0-10
PAIN_FUNCTIONAL_ASSESSMENT: 0-10

## 2024-02-25 ASSESSMENT — PAIN DESCRIPTION - ORIENTATION: ORIENTATION: RIGHT;LEFT;UPPER

## 2024-02-25 ASSESSMENT — PAIN DESCRIPTION - ONSET: ONSET: ON-GOING

## 2024-02-25 ASSESSMENT — LIFESTYLE VARIABLES
HOW MANY STANDARD DRINKS CONTAINING ALCOHOL DO YOU HAVE ON A TYPICAL DAY: 1 OR 2
HOW OFTEN DO YOU HAVE A DRINK CONTAINING ALCOHOL: MONTHLY OR LESS

## 2024-02-25 ASSESSMENT — PAIN DESCRIPTION - DESCRIPTORS
DESCRIPTORS: DISCOMFORT
DESCRIPTORS: CRAMPING;DISCOMFORT

## 2024-02-25 ASSESSMENT — PAIN DESCRIPTION - FREQUENCY: FREQUENCY: CONTINUOUS

## 2024-02-25 ASSESSMENT — PAIN DESCRIPTION - PAIN TYPE: TYPE: ACUTE PAIN

## 2024-02-25 ASSESSMENT — PAIN DESCRIPTION - LOCATION
LOCATION: ABDOMEN
LOCATION: ABDOMEN

## 2024-02-25 ASSESSMENT — PAIN SCALES - GENERAL
PAINLEVEL_OUTOF10: 8
PAINLEVEL_OUTOF10: 8

## 2024-02-25 NOTE — ED PROVIDER NOTES
Keysha Martinez is a 38 y.o. female    HPI  Keysha Martinez is a 38 y.o. female presenting to the ED for Abdominal Pain (n/v/d, chills, abdominal pain/cramping) and Shortness of Breath (Medication rxn (possiblyTrulicity); denies CP, oral swelling, able to swallow saliva)    History comes primarily from the patient.  Patient presents to the emergency department for abdominal pain that is generalized, nausea, vomiting, diarrhea, chills without any fevers, shortness of breath.  She believes that she is having a reaction to a new medication, Trulicity.  She had previously been on Ozempic and was switched to Trulicity with her second dose of the medication on Tuesday which is when her symptoms started.  The patient is taking Trulicity for PCOS.  She has not been able to keep down any food hardly at all for the last several days and says that even fluid is coming up now.  She has tried some over-the-counter GI medications but does not have any antiemetics at home.     Patient's most recent menstrual period was last week which was the second time this month.  She does have a history of irregular menstrual period and has PCOS.  She has had a cholecystectomy but still has her appendix present.      ROS  Full review of systems completed.  Pertinent positives and negatives per the HPI, unless otherwise stated ROS is negative.    Physical Exam  Vitals and nursing note reviewed.   Constitutional:       General: She is not in acute distress.     Appearance: Normal appearance. She is obese. She is not ill-appearing.   HENT:      Head: Normocephalic and atraumatic.      Right Ear: External ear normal.      Left Ear: External ear normal.      Nose: Nose normal. No rhinorrhea.      Mouth/Throat:      Mouth: Mucous membranes are moist.      Pharynx: Oropharynx is clear.   Eyes:      Extraocular Movements: Extraocular movements intact.      Conjunctiva/sclera: Conjunctivae normal.      Pupils: Pupils are equal, round, and reactive to      Treatment here in the ED:   Medications   ondansetron (ZOFRAN) injection 4 mg (4 mg IntraVENous Given 2/25/24 1711)   sodium chloride 0.9 % bolus 1,000 mL (0 mLs IntraVENous Stopped 2/25/24 1754)   sodium chloride 0.9 % bolus 1,000 mL (0 mLs IntraVENous Stopped 2/25/24 1959)   iopamidol (ISOVUE-370) 76 % injection 75 mL (75 mLs IntraVENous Given 2/25/24 1901)   ondansetron (ZOFRAN) injection 4 mg (4 mg IntraVENous Given 2/25/24 2030)   prochlorperazine (COMPAZINE) injection 10 mg (10 mg IntraVENous Given 2/25/24 2245)   diphenhydrAMINE (BENADRYL) injection 25 mg (25 mg IntraVENous Given 2/25/24 2247)         Discussion: Patient presents for nausea, vomiting, diarrhea, abdominal pain and cramping along with some mild shortness of breath associated with it.  She is concerned that it may be related to a medication change from Ozempic to Trulicity as her symptoms started shortly after her second dose of Trulicity.  The patient's initial blood count looks highly hemoconcentrated.  Patient's creatinine is slightly elevated from baseline at 1.1 where her baseline is 0.8.  Urinalysis is not concerning for infection.  CT scan was notable only for likely enteritis.  Patient was treated with antiemetics, fluids.  On initial reassessment, the patient was feeling better but was not able to tolerate fluids at that time yet.  She was given a second antiemetic and then was able to tolerate some fluids.  I did discuss possible admission with the patient but she would rather go home and is feeling improved.  I prescribed her Zofran outpatient and a repeat CBC was much more normal, though the patient still had an elevated white count which was likely reactive related to her nausea and vomiting.  I did give the patient strict return precautions including worsening pain, intractable nausea and vomiting, fever or other new symptoms concerning.  Patient is discharged.                    Past medical history/chonic conditions affecting

## 2024-02-26 LAB
EKG ATRIAL RATE: 140 BPM
EKG P AXIS: 73 DEGREES
EKG P-R INTERVAL: 130 MS
EKG Q-T INTERVAL: 288 MS
EKG QRS DURATION: 76 MS
EKG QTC CALCULATION (BAZETT): 439 MS
EKG R AXIS: 89 DEGREES
EKG T AXIS: 35 DEGREES
EKG VENTRICULAR RATE: 140 BPM

## 2024-02-26 PROCEDURE — 93010 ELECTROCARDIOGRAM REPORT: CPT | Performed by: INTERNAL MEDICINE

## 2024-02-26 NOTE — ED NOTES
Introduced self to patient. Patient awake in bed. Call light within reach. Patient requesting zofran. Will ask provider.

## 2024-02-26 NOTE — DISCHARGE INSTRUCTIONS
Please do not take trulicity again until you can discuss with your primary care doctor or the doctor who is prescribing it to you.

## 2024-03-11 ENCOUNTER — TELEPHONE (OUTPATIENT)
Dept: FAMILY MEDICINE CLINIC | Age: 39
End: 2024-03-11

## 2024-03-11 NOTE — TELEPHONE ENCOUNTER
Patient stated that she wants to keep her appointment with us but is still going to see if she can get in with cardiology before that.

## 2024-03-11 NOTE — TELEPHONE ENCOUNTER
Please let the patient know that we can discuss a cardiology referral at her next appointment, we can get an EKG in the office and further workup her concerns prior to sending her to a specialist.  If if her symptoms of dizziness or vision changes worsen, or if she develops new symptoms like chest pain or shortness of breath, she needs to call to make an urgent appointment as soon as she can or go to the emergency department.    Bailee Dowd DO  Family Medicine Resident, PGY-2  [facility]  3/11/2024 1:24 PM

## 2024-03-11 NOTE — TELEPHONE ENCOUNTER
Regarding: Cardio referral   Contact: [phone]  ----- Message from Mary Rojas MA sent at 3/11/2024 10:15 AM EDT -----       ----- Message from Keysha Martinez to Bailee Dowd DO sent at 3/11/2024 10:05 AM -----   Good morning.     I was not able to stay today as it appeared you were behind and my daughter had an urgent appt. I am basically looking for a cardio referral as I have been tracking my heart rate and it is high (without my vyvanse) it ranges typically between 110-140.  I have been dizzy/vision changes and swelling  Appears when I am on my feet and moving. I can feel my heart beat in my feet/fingers.     My time is stretched very thin right now and it’s hard to take time for myself so cutting out another appointment where I have to wait will be a huge help.     I will be driving from [phone] if you would like to call me at [phone]. If not, I will call my insurance to see if I can obtain a cardiologist on my own today.     I appreciate the support and assistance.

## 2024-03-11 NOTE — TELEPHONE ENCOUNTER
I will not send the cardiology referral until I see her later this month to evaluate her, per my earlier documentation. Thank you.    Bailee Dowd DO  Family Medicine Resident, PGY-2  [facility]  3/11/2024 2:35 PM

## 2024-03-21 ENCOUNTER — TELEPHONE (OUTPATIENT)
Dept: FAMILY MEDICINE CLINIC | Age: 39
End: 2024-03-21

## 2024-03-21 NOTE — TELEPHONE ENCOUNTER
FW: Appointment canceled   Bailee Dowd DO   Sent: u 2024 12:03 PM   To: [facility]       Keysha Martinez   MRN: [id number] : 1985   Pt Home: [phone]     Entered: [phone]        Message    Can we document this in her chart please?   ----- Message -----   From: Jackie Correa   Sent: 3/18/2024   7:45 AM EDT   To: Chance Shay MD; Bailee Dowd DO   Subject: FW: Appointment canceled                             ----- Message -----   From: Keysha Martinez   Sent: 3/17/2024   6:18 PM EDT   To: [facility]    Subject: Appointment canceled                              Appointment canceled for Keysha Martinez ([id number])   Visit Type: ED FOLLOW UP   Date        Time      Length    Provider                  Department   3/22/2024    9:50 AM  20 mins.  Bailee BOWLING DO             [facility]      Reason for Cancellation: Patient preference      Patient Comments: Do not want this provider as my pcp           Appointment canceled  (Newest Message First)  View All Conversations on this Encounter  Bailee Dowd DO  [facility] Front Desk6 minutes ago (12:03 PM)     ST  Can we document this in her chart please?        Jackie Correa routed conversation to Bailee Dowd DO; Chance Shay MD3 days ago     Keysha Martinez  P [facility] Front Desk4 days ago       Appointment canceled for Keysha Martinez ([id number])  Visit Type: ED FOLLOW UP  Date        Time      Length    Provider                  Department  3/22/2024    9:50 AM  20 mins.  Bailee BOWLING DO             [facility]     Reason for Cancellation: Patient preference     Patient Comments: Do not want this provider as my pcp       Amparo, Batch Job User  Keysha Martinez R10 days ago

## 2024-04-08 ENCOUNTER — TELEPHONE (OUTPATIENT)
Dept: FAMILY MEDICINE CLINIC | Age: 39
End: 2024-04-08

## 2024-04-08 NOTE — TELEPHONE ENCOUNTER
Patient sent [employer] message 3/17/2024 regarding cancelling her appointment, reason for cancellation \"Patient preference\" with patient comments \"Do not want this provider as my pcp.\"    Patient has visit to establish with new provider in May, until then I am her PCP.  She needs to be evaluated in our office so we can further workup her concerns prior to sending her to a specialist.  If her symptoms worsen, or if she develops new symptoms like chest pain or shortness of breath, she needs to go to the emergency department.     Bailee Dowd DO  Family Medicine Resident, PGY-2  [facility]  4/8/2024 2:12 PM

## 2024-04-08 NOTE — TELEPHONE ENCOUNTER
Regarding: Cardio referral   Contact: [phone]  ----- Message from Abundio Causey LPN sent at 4/8/2024  1:53 PM EDT -----       ----- Message sent from Abundio Causey LPN to Keysha Martinez at 4/8/2024  1:52 PM -----   Thank you for using [employer].  We have received your message and are currently addressing your request. You will receive a reply within 1-2 business days.  Thank you.        ----- Message -----       From:Keysha Martinez       Sent:4/8/2024 12:11 PM EDT         To:Patient Medical Advice Request Message List    Subject:Cardio referral     Good afternoon,     I am following up on this email, I am hopeful my last ed visit and frustration will warrant a cardio evaluation. I would like to have a cardio referral placed so I am able to get the care that I feel that I’m in need of getting, and feel that my care has been neglected since the departure of Dr. Grey. I have already showed up for an appt last month and my time was not respected. This is a minimal request. I  have also relayed my concerns to the patient advocate to ensure my care is not interrupted/prolonged even further. Please respond within this Secure Message. Thank you.       ----- Message -----       From:Keysha Martinez       Sent:3/11/2024 10:05 AM EDT         To:Bailee Dowd DO    Subject:Cardio referral     Good morning.     I was not able to stay today as it appeared you were behind and my daughter had an urgent appt. I am basically looking for a cardio referral as I have been tracking my heart rate and it is high (without my vyvanse) it ranges typically between 110-140.  I have been dizzy/vision changes and swelling  Appears when I am on my feet and moving. I can feel my heart beat in my feet/fingers.     My time is stretched very thin right now and it’s hard to take time for myself so cutting out another appointment where I have to wait will be a huge help.     I will be driving from [phone] if you would like to call me at

## 2024-04-09 ENCOUNTER — HOSPITAL ENCOUNTER (OUTPATIENT)
Dept: PHYSICAL THERAPY | Age: 39
Setting detail: THERAPIES SERIES
Discharge: HOME | End: 2024-04-09
Payer: COMMERCIAL

## 2024-04-09 PROCEDURE — 97161 PT EVAL LOW COMPLEX 20 MIN: CPT

## 2024-04-10 NOTE — TELEPHONE ENCOUNTER
Left voicemail for patient asking her to return call to the office to address some of her concerns. Called again with no answer.    Bailee Dowd DO  Family Medicine Resident, PGY-2  [facility]  4/10/2024 12:45 PM

## 2024-04-12 ENCOUNTER — HOSPITAL ENCOUNTER (OUTPATIENT)
Dept: PHYSICAL THERAPY | Age: 39
Setting detail: THERAPIES SERIES
Discharge: HOME | End: 2024-04-12
Payer: COMMERCIAL

## 2024-04-12 PROCEDURE — G0283 ELEC STIM OTHER THAN WOUND: HCPCS

## 2024-04-12 PROCEDURE — 97140 MANUAL THERAPY 1/> REGIONS: CPT
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
## 2024-04-12 NOTE — PROGRESS NOTES
ASSESSMENT     Impression:      Body Structures, Functions, Activity Limitations Requiring Skilled Therapeutic Intervention: Decreased functional mobility , Decreased ROM, Decreased strength, Decreased endurance, Decreased coordination, Decreased posture, Increased pain    Statement of Medical Necessity: Physical Therapy is both indicated and medically necessary as outlined in the POC to increase the likelihood of meeting the functionally related goals stated below.     Patient's Activity Tolerance:        Patient's rehabilitation potential/prognosis is considered to be: Fair    Factors which may impact rehabilitation potential include: None        GOALS   Patient Goal(s):    Short Term Goals Completed by 2 weeks Goal Status   PAtient will report a consistent and sustained reduction in acute cervical region pain while being able to engage in consistent active exercise New   Establish HEP New                         Long Term Goals Completed by 4-6 weeks Goal Status   Patient will be able to maintain normal work/home ADL's while being able to effectively , and consistently, control cervical region pain at 3/10 or less Pain frequency Intermittent or less New   AROM cervica/,scapualr region WFL's all ranges New   Strength posterior cervico-scapular region 4- to 4/5 New   Postural awareness Fair + or better New   Patient will be able to maintain and self direct an appropriate follow up independent exercise program New        TREATMENT PLAN       Requires PT Follow-Up: Yes    Pt. actively involved in establishing Plan of Care and Goals: Yes  Patient/ Caregiver education and instruction:               Treatment may include any combination of the following: Strengthening, ROM, Functional mobility training, Endurance training, Manual, Modalities, Patient/Caregiver education & training, Home exercise program     Frequency / Duration:  Patient to be seen 2-3 times per week for 4-6 weeks weeks      Eval Complexity: Overall
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
## 2024-04-16 ENCOUNTER — HOSPITAL ENCOUNTER (OUTPATIENT)
Dept: PHYSICAL THERAPY | Age: 39
Setting detail: THERAPIES SERIES
Discharge: HOME | End: 2024-04-16
Payer: COMMERCIAL

## 2024-04-16 PROCEDURE — G0283 ELEC STIM OTHER THAN WOUND: HCPCS

## 2024-04-16 PROCEDURE — 97110 THERAPEUTIC EXERCISES: CPT
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
## 2024-04-18 ENCOUNTER — HOSPITAL ENCOUNTER (OUTPATIENT)
Dept: PHYSICAL THERAPY | Age: 39
Setting detail: THERAPIES SERIES
Discharge: HOME | End: 2024-04-18
Payer: COMMERCIAL

## 2024-04-18 PROCEDURE — 97140 MANUAL THERAPY 1/> REGIONS: CPT

## 2024-04-18 PROCEDURE — G0283 ELEC STIM OTHER THAN WOUND: HCPCS
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
## 2024-04-23 ENCOUNTER — HOSPITAL ENCOUNTER (OUTPATIENT)
Dept: PHYSICAL THERAPY | Age: 39
Setting detail: THERAPIES SERIES
Discharge: HOME | End: 2024-04-23
Payer: OTHER GOVERNMENT

## 2024-04-23 PROCEDURE — 97110 THERAPEUTIC EXERCISES: CPT

## 2024-04-23 PROCEDURE — G0283 ELEC STIM OTHER THAN WOUND: HCPCS
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
## 2024-04-26 ENCOUNTER — HOSPITAL ENCOUNTER (OUTPATIENT)
Dept: PHYSICAL THERAPY | Age: 39
Setting detail: THERAPIES SERIES
Discharge: HOME | End: 2024-04-26
Payer: OTHER GOVERNMENT

## 2024-04-26 PROCEDURE — 97110 THERAPEUTIC EXERCISES: CPT

## 2024-04-26 PROCEDURE — G0283 ELEC STIM OTHER THAN WOUND: HCPCS
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
## 2024-04-30 ENCOUNTER — HOSPITAL ENCOUNTER (OUTPATIENT)
Dept: PHYSICAL THERAPY | Age: 39
Setting detail: THERAPIES SERIES
Discharge: HOME | End: 2024-04-30
Payer: OTHER GOVERNMENT

## 2024-04-30 PROCEDURE — 97110 THERAPEUTIC EXERCISES: CPT

## 2024-04-30 PROCEDURE — G0283 ELEC STIM OTHER THAN WOUND: HCPCS
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
## 2024-05-03 ENCOUNTER — HOSPITAL ENCOUNTER (OUTPATIENT)
Dept: PHYSICAL THERAPY | Age: 39
Setting detail: THERAPIES SERIES
End: 2024-05-03
Payer: OTHER GOVERNMENT

## 2024-05-03 NOTE — PROGRESS NOTES
[facility]  Phone: [phone] Fax: [phone]     Physical Therapy  Cancellation/No-show Note  Patient Name:  Keysha Martinez  :  1985   Date:  5/3/2024    For today's appointment patient:  [x]  Cancelled  []  Rescheduled appointment  []  No-show     Reason given by patient:  []  Patient ill  []  Conflicting appointment  []  No transportation    [x]  Conflict with work  []  No reason given  []  Other:     Comments:  Next PT session 24    Electronically signed by:  Rashid Dallas, PT [id number]

## 2024-05-07 ENCOUNTER — HOSPITAL ENCOUNTER (OUTPATIENT)
Dept: PHYSICAL THERAPY | Age: 39
Setting detail: THERAPIES SERIES
Discharge: HOME | End: 2024-05-07
Payer: OTHER GOVERNMENT

## 2024-05-07 PROCEDURE — G0283 ELEC STIM OTHER THAN WOUND: HCPCS

## 2024-05-07 SDOH — HEALTH STABILITY: PHYSICAL HEALTH: ON AVERAGE, HOW MANY DAYS PER WEEK DO YOU ENGAGE IN MODERATE TO STRENUOUS EXERCISE (LIKE A BRISK WALK)?: 1 DAY

## 2024-05-07 SDOH — HEALTH STABILITY: PHYSICAL HEALTH: ON AVERAGE, HOW MANY MINUTES DO YOU ENGAGE IN EXERCISE AT THIS LEVEL?: 30 MIN
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
## 2024-05-08 ENCOUNTER — OFFICE VISIT (OUTPATIENT)
Dept: PRIMARY CARE CLINIC | Age: 39
End: 2024-05-08
Payer: OTHER GOVERNMENT

## 2024-05-08 VITALS
HEIGHT: 69 IN | OXYGEN SATURATION: 99 % | WEIGHT: 248 LBS | BODY MASS INDEX: 36.73 KG/M2 | SYSTOLIC BLOOD PRESSURE: 118 MMHG | RESPIRATION RATE: 16 BRPM | TEMPERATURE: 98 F | DIASTOLIC BLOOD PRESSURE: 78 MMHG | HEART RATE: 98 BPM

## 2024-05-08 DIAGNOSIS — Z76.89 ENCOUNTER TO ESTABLISH CARE: ICD-10-CM

## 2024-05-08 DIAGNOSIS — R00.0 TACHYCARDIA: Primary | ICD-10-CM

## 2024-05-08 DIAGNOSIS — R00.2 PALPITATION: ICD-10-CM

## 2024-05-08 DIAGNOSIS — E66.09 CLASS 1 OBESITY DUE TO EXCESS CALORIES WITHOUT SERIOUS COMORBIDITY WITH BODY MASS INDEX (BMI) OF 34.0 TO 34.9 IN ADULT: ICD-10-CM

## 2024-05-08 DIAGNOSIS — J45.20 MILD INTERMITTENT ASTHMA WITHOUT COMPLICATION: ICD-10-CM

## 2024-05-08 PROBLEM — I50.9 HEART FAILURE, UNSPECIFIED HF CHRONICITY, UNSPECIFIED HEART FAILURE TYPE (HCC): Status: RESOLVED | Noted: 2024-05-08 | Resolved: 2024-05-08

## 2024-05-08 PROBLEM — I50.9 HEART FAILURE, UNSPECIFIED HF CHRONICITY, UNSPECIFIED HEART FAILURE TYPE (HCC): Status: ACTIVE | Noted: 2024-05-08

## 2024-05-08 PROCEDURE — 99214 OFFICE O/P EST MOD 30 MIN: CPT | Performed by: PHYSICIAN ASSISTANT

## 2024-05-08 PROCEDURE — G8417 CALC BMI ABV UP PARAM F/U: HCPCS | Performed by: PHYSICIAN ASSISTANT

## 2024-05-08 PROCEDURE — G8427 DOCREV CUR MEDS BY ELIG CLIN: HCPCS | Performed by: PHYSICIAN ASSISTANT

## 2024-05-08 PROCEDURE — 1036F TOBACCO NON-USER: CPT | Performed by: PHYSICIAN ASSISTANT

## 2024-05-08 NOTE — PROGRESS NOTES
Breast Cancer Mother 33    Thyroid Cancer Mother 39    No Known Problems Father         Heart issues run in family    Cancer Maternal Aunt         skin    Pancreatic Cancer Maternal Grandfather 50    Breast Cancer Maternal Cousin 36    Breast Cancer Maternal Cousin 35        second cousin    No Known Problems Sister     No Known Problems Brother     No Known Problems Maternal Grandmother      Social History     Socioeconomic History    Marital status:      Spouse name: Not on file    Number of children: 2    Years of education: 16    Highest education level: Associate degree: academic program   Occupational History    Not on file   Tobacco Use    Smoking status: Never    Smokeless tobacco: Never   Vaping Use    Vaping Use: Never used   Substance and Sexual Activity    Alcohol use: Yes     Alcohol/week: 0.0 standard drinks of alcohol     Comment: socially    Drug use: No    Sexual activity: Defer     Birth control/protection: Pill   Other Topics Concern    Not on file   Social History Narrative    Not on file     Social Determinants of Health     Financial Resource Strain: Medium Risk (1/8/2020)    Overall Financial Resource Strain (CARDIA)     Difficulty of Paying Living Expenses: Somewhat hard   Food Insecurity: Not on file (7/18/2023)   Transportation Needs: No Transportation Needs (1/8/2020)    PRAPARE - Transportation     Lack of Transportation (Medical): No     Lack of Transportation (Non-Medical): No   Physical Activity: Insufficiently Active (5/7/2024)    Exercise Vital Sign     Days of Exercise per Week: 1 day     Minutes of Exercise per Session: 30 min   Stress: Not on file   Social Connections: Not on file   Intimate Partner Violence: Not on file   Housing Stability: Not on file      Allergies   Allergen Reactions    Bupropion Other (See Comments)     migraines    Seasonal     Clindamycin Nausea And Vomiting        Review of Systems:  Constitutional:  No fever, no fatigue, no chills, no headaches,

## 2024-05-10 ENCOUNTER — HOSPITAL ENCOUNTER (OUTPATIENT)
Age: 39
Discharge: HOME | End: 2024-05-10
Payer: OTHER GOVERNMENT

## 2024-05-10 ENCOUNTER — HOSPITAL ENCOUNTER (OUTPATIENT)
Dept: PHYSICAL THERAPY | Age: 39
Setting detail: THERAPIES SERIES
Discharge: HOME | End: 2024-05-10
Payer: OTHER GOVERNMENT

## 2024-05-10 DIAGNOSIS — E66.09 CLASS 1 OBESITY DUE TO EXCESS CALORIES WITHOUT SERIOUS COMORBIDITY WITH BODY MASS INDEX (BMI) OF 34.0 TO 34.9 IN ADULT: ICD-10-CM

## 2024-05-10 DIAGNOSIS — R00.0 TACHYCARDIA: ICD-10-CM

## 2024-05-10 DIAGNOSIS — R00.2 PALPITATION: ICD-10-CM

## 2024-05-10 LAB
25(OH)D3 SERPL-MCNC: 29.6 NG/ML (ref 30–100)
ALBUMIN SERPL-MCNC: 4.4 G/DL (ref 3.5–5.2)
ALP SERPL-CCNC: 39 U/L (ref 35–104)
ALT SERPL-CCNC: 13 U/L (ref 0–32)
ANION GAP SERPL CALCULATED.3IONS-SCNC: 9 MMOL/L (ref 7–16)
AST SERPL-CCNC: 15 U/L (ref 0–31)
BASOPHILS # BLD: 0.03 K/UL (ref 0–0.2)
BASOPHILS NFR BLD: 1 % (ref 0–2)
BILIRUB SERPL-MCNC: 0.5 MG/DL (ref 0–1.2)
BUN SERPL-MCNC: 10 MG/DL (ref 6–20)
CALCIUM SERPL-MCNC: 9.3 MG/DL (ref 8.6–10.2)
CANCER AG125 SERPL-ACNC: 66 U/ML (ref 0–35)
CHLORIDE SERPL-SCNC: 104 MMOL/L (ref 98–107)
CHOLEST SERPL-MCNC: 199 MG/DL
CO2 SERPL-SCNC: 25 MMOL/L (ref 22–29)
CREAT SERPL-MCNC: 0.7 MG/DL (ref 0.5–1)
EOSINOPHIL # BLD: 0.16 K/UL (ref 0.05–0.5)
EOSINOPHILS RELATIVE PERCENT: 3 % (ref 0–6)
ERYTHROCYTE [DISTWIDTH] IN BLOOD BY AUTOMATED COUNT: 11.9 % (ref 11.5–15)
GFR, ESTIMATED: >90 ML/MIN/1.73M2
GLUCOSE SERPL-MCNC: 93 MG/DL (ref 74–99)
HCT VFR BLD AUTO: 42.1 % (ref 34–48)
HDLC SERPL-MCNC: 52 MG/DL
HGB BLD-MCNC: 13.9 G/DL (ref 11.5–15.5)
IMM GRANULOCYTES # BLD AUTO: <0.03 K/UL (ref 0–0.58)
IMM GRANULOCYTES NFR BLD: 0 % (ref 0–5)
LDLC SERPL CALC-MCNC: 128 MG/DL
LYMPHOCYTES NFR BLD: 2.03 K/UL (ref 1.5–4)
LYMPHOCYTES RELATIVE PERCENT: 34 % (ref 20–42)
MAGNESIUM SERPL-MCNC: 1.9 MG/DL (ref 1.6–2.6)
MCH RBC QN AUTO: 31.2 PG (ref 26–35)
MCHC RBC AUTO-ENTMCNC: 33 G/DL (ref 32–34.5)
MCV RBC AUTO: 94.6 FL (ref 80–99.9)
MONOCYTES NFR BLD: 0.38 K/UL (ref 0.1–0.95)
MONOCYTES NFR BLD: 6 % (ref 2–12)
NEUTROPHILS NFR BLD: 56 % (ref 43–80)
NEUTS SEG NFR BLD: 3.34 K/UL (ref 1.8–7.3)
PLATELET # BLD AUTO: 345 K/UL (ref 130–450)
PMV BLD AUTO: 10.3 FL (ref 7–12)
POTASSIUM SERPL-SCNC: 4.2 MMOL/L (ref 3.5–5)
PROT SERPL-MCNC: 7.5 G/DL (ref 6.4–8.3)
RBC # BLD AUTO: 4.45 M/UL (ref 3.5–5.5)
SODIUM SERPL-SCNC: 138 MMOL/L (ref 132–146)
T4 FREE SERPL-MCNC: 1.3 NG/DL (ref 0.9–1.7)
TRIGL SERPL-MCNC: 93 MG/DL
TSH SERPL DL<=0.05 MIU/L-ACNC: 0.94 UIU/ML (ref 0.27–4.2)
VLDLC SERPL CALC-MCNC: 19 MG/DL
WBC OTHER # BLD: 6 K/UL (ref 4.5–11.5)

## 2024-05-10 PROCEDURE — 86304 IMMUNOASSAY TUMOR CA 125: CPT

## 2024-05-10 PROCEDURE — 82306 VITAMIN D 25 HYDROXY: CPT

## 2024-05-10 PROCEDURE — 84439 ASSAY OF FREE THYROXINE: CPT

## 2024-05-10 PROCEDURE — 80061 LIPID PANEL: CPT

## 2024-05-10 PROCEDURE — 84443 ASSAY THYROID STIM HORMONE: CPT

## 2024-05-10 PROCEDURE — 80053 COMPREHEN METABOLIC PANEL: CPT

## 2024-05-10 PROCEDURE — 85025 COMPLETE CBC W/AUTO DIFF WBC: CPT

## 2024-05-10 PROCEDURE — 36415 COLL VENOUS BLD VENIPUNCTURE: CPT

## 2024-05-10 PROCEDURE — 97110 THERAPEUTIC EXERCISES: CPT

## 2024-05-10 PROCEDURE — G0283 ELEC STIM OTHER THAN WOUND: HCPCS

## 2024-05-10 PROCEDURE — 83735 ASSAY OF MAGNESIUM: CPT
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
## 2024-05-13 ENCOUNTER — PATIENT MESSAGE (OUTPATIENT)
Dept: PRIMARY CARE CLINIC | Age: 39
End: 2024-05-13

## 2024-05-13 ENCOUNTER — HOSPITAL ENCOUNTER (OUTPATIENT)
Dept: PHYSICAL THERAPY | Age: 39
Setting detail: THERAPIES SERIES
Discharge: HOME | End: 2024-05-13
Payer: OTHER GOVERNMENT

## 2024-05-13 PROCEDURE — G0283 ELEC STIM OTHER THAN WOUND: HCPCS

## 2024-05-13 PROCEDURE — 97110 THERAPEUTIC EXERCISES: CPT

## 2024-05-13 NOTE — TELEPHONE ENCOUNTER
From: Keysha Martinez  To: Juana Wilson  Sent: 5/13/2024 10:40 AM EDT  Subject: Heart monitor     Hello, good morning! I wanted to follow up on the heart monitor. Are you able to clarify if I need to pick it up somewhere or if it will be delivered to my home?     Thank you.
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
## 2024-05-14 ENCOUNTER — HOSPITAL ENCOUNTER (OUTPATIENT)
Dept: CARDIOLOGY | Age: 39
Discharge: HOME | End: 2024-05-16
Payer: OTHER GOVERNMENT

## 2024-05-14 VITALS
DIASTOLIC BLOOD PRESSURE: 78 MMHG | SYSTOLIC BLOOD PRESSURE: 118 MMHG | WEIGHT: 248 LBS | BODY MASS INDEX: 36.73 KG/M2 | HEIGHT: 69 IN

## 2024-05-14 DIAGNOSIS — R00.0 TACHYCARDIA: ICD-10-CM

## 2024-05-14 DIAGNOSIS — R00.2 PALPITATION: ICD-10-CM

## 2024-05-14 LAB
ECHO AV AREA PEAK VELOCITY: 3.4 CM2
ECHO AV AREA VTI: 3.8 CM2
ECHO AV AREA/BSA PEAK VELOCITY: 1.5 CM2/M2
ECHO AV AREA/BSA VTI: 1.7 CM2/M2
ECHO AV CUSP MM: 2 CM
ECHO AV MEAN GRADIENT: 3 MMHG
ECHO AV MEAN VELOCITY: 0.8 M/S
ECHO AV PEAK GRADIENT: 6 MMHG
ECHO AV PEAK VELOCITY: 1.2 M/S
ECHO AV VELOCITY RATIO: 0.92
ECHO AV VTI: 25.5 CM
ECHO BSA: 2.34 M2
ECHO EST RA PRESSURE: 3 MMHG
ECHO LA DIAMETER INDEX: 1.68 CM/M2
ECHO LA DIAMETER: 3.8 CM
ECHO LA VOL A-L A2C: 48 ML (ref 22–52)
ECHO LA VOL A-L A4C: 54 ML (ref 22–52)
ECHO LA VOL MOD A2C: 46 ML (ref 22–52)
ECHO LA VOL MOD A4C: 52 ML (ref 22–52)
ECHO LA VOLUME AREA LENGTH: 54 ML
ECHO LA VOLUME INDEX A-L A2C: 21 ML/M2 (ref 16–34)
ECHO LA VOLUME INDEX A-L A4C: 24 ML/M2 (ref 16–34)
ECHO LA VOLUME INDEX AREA LENGTH: 24 ML/M2 (ref 16–34)
ECHO LA VOLUME INDEX MOD A2C: 20 ML/M2 (ref 16–34)
ECHO LA VOLUME INDEX MOD A4C: 23 ML/M2 (ref 16–34)
ECHO LV EDV A2C: 123 ML
ECHO LV EDV A4C: 125 ML
ECHO LV EDV BP: 124 ML (ref 56–104)
ECHO LV EDV INDEX A4C: 55 ML/M2
ECHO LV EDV INDEX BP: 55 ML/M2
ECHO LV EDV NDEX A2C: 54 ML/M2
ECHO LV EF PHYSICIAN: 65 %
ECHO LV EJECTION FRACTION A2C: 71 %
ECHO LV EJECTION FRACTION A4C: 59 %
ECHO LV EJECTION FRACTION BIPLANE: 65 % (ref 55–100)
ECHO LV ESV A2C: 35 ML
ECHO LV ESV A4C: 51 ML
ECHO LV ESV BP: 44 ML (ref 19–49)
ECHO LV ESV INDEX A2C: 15 ML/M2
ECHO LV ESV INDEX A4C: 23 ML/M2
ECHO LV ESV INDEX BP: 19 ML/M2
ECHO LV FRACTIONAL SHORTENING: 37 % (ref 28–44)
ECHO LV INTERNAL DIMENSION DIASTOLE INDEX: 2.04 CM/M2
ECHO LV INTERNAL DIMENSION DIASTOLIC: 4.6 CM (ref 3.9–5.3)
ECHO LV INTERNAL DIMENSION SYSTOLIC INDEX: 1.28 CM/M2
ECHO LV INTERNAL DIMENSION SYSTOLIC: 2.9 CM
ECHO LV ISOVOLUMETRIC RELAXATION TIME (IVRT): 115.3 MS
ECHO LV IVSD: 1.2 CM (ref 0.6–0.9)
ECHO LV MASS 2D: 169.9 G (ref 67–162)
ECHO LV MASS INDEX 2D: 75.2 G/M2 (ref 43–95)
ECHO LV POSTERIOR WALL DIASTOLIC: 0.9 CM (ref 0.6–0.9)
ECHO LV RELATIVE WALL THICKNESS RATIO: 0.39
ECHO LVOT AREA: 3.8 CM2
ECHO LVOT AV VTI INDEX: 1
ECHO LVOT DIAM: 2.2 CM
ECHO LVOT MEAN GRADIENT: 3 MMHG
ECHO LVOT PEAK GRADIENT: 5 MMHG
ECHO LVOT PEAK VELOCITY: 1.1 M/S
ECHO LVOT STROKE VOLUME INDEX: 42.9 ML/M2
ECHO LVOT SV: 96.9 ML
ECHO LVOT VTI: 25.5 CM
ECHO MV A VELOCITY: 0.76 M/S
ECHO MV AREA PHT: 3.6 CM2
ECHO MV AREA VTI: 3 CM2
ECHO MV E DECELERATION TIME (DT): 244.8 MS
ECHO MV E VELOCITY: 1 M/S
ECHO MV E/A RATIO: 1.32
ECHO MV LVOT VTI INDEX: 1.28
ECHO MV MAX VELOCITY: 1.2 M/S
ECHO MV MEAN GRADIENT: 2 MMHG
ECHO MV MEAN VELOCITY: 0.7 M/S
ECHO MV PEAK GRADIENT: 5 MMHG
ECHO MV PRESSURE HALF TIME (PHT): 61.7 MS
ECHO MV VTI: 32.7 CM
ECHO PV MAX VELOCITY: 0.8 M/S
ECHO PV MEAN GRADIENT: 1 MMHG
ECHO PV MEAN VELOCITY: 0.5 M/S
ECHO PV PEAK GRADIENT: 3 MMHG
ECHO PV VTI: 15.8 CM
ECHO PVEIN A DURATION: 92.3 MS
ECHO PVEIN A VELOCITY: 0.3 M/S
ECHO PVEIN PEAK D VELOCITY: 0.4 M/S
ECHO PVEIN PEAK S VELOCITY: 0.6 M/S
ECHO PVEIN S/D RATIO: 1.5
ECHO RIGHT VENTRICULAR SYSTOLIC PRESSURE (RVSP): 16 MMHG
ECHO RV INTERNAL DIMENSION: 2.9 CM
ECHO TV REGURGITANT MAX VELOCITY: 1.81 M/S
ECHO TV REGURGITANT PEAK GRADIENT: 13 MMHG

## 2024-05-14 PROCEDURE — 93306 TTE W/DOPPLER COMPLETE: CPT

## 2024-05-14 PROCEDURE — 93306 TTE W/DOPPLER COMPLETE: CPT | Performed by: INTERNAL MEDICINE

## 2024-05-15 ENCOUNTER — HOSPITAL ENCOUNTER (OUTPATIENT)
Dept: PHYSICAL THERAPY | Age: 39
Setting detail: THERAPIES SERIES
Discharge: HOME | End: 2024-05-15
Payer: OTHER GOVERNMENT

## 2024-05-15 PROCEDURE — 97110 THERAPEUTIC EXERCISES: CPT

## 2024-05-15 PROCEDURE — G0283 ELEC STIM OTHER THAN WOUND: HCPCS
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
## 2024-05-21 ENCOUNTER — HOSPITAL ENCOUNTER (OUTPATIENT)
Dept: SLEEP CENTER | Age: 39
Discharge: HOME | End: 2024-05-21
Payer: OTHER GOVERNMENT

## 2024-05-21 ENCOUNTER — HOSPITAL ENCOUNTER (OUTPATIENT)
Dept: PHYSICAL THERAPY | Age: 39
Setting detail: THERAPIES SERIES
Discharge: HOME | End: 2024-05-21
Payer: OTHER GOVERNMENT

## 2024-05-21 DIAGNOSIS — R00.0 TACHYCARDIA: ICD-10-CM

## 2024-05-21 PROCEDURE — 93225 XTRNL ECG REC<48 HRS REC: CPT

## 2024-05-21 PROCEDURE — 97110 THERAPEUTIC EXERCISES: CPT

## 2024-05-21 PROCEDURE — G0283 ELEC STIM OTHER THAN WOUND: HCPCS
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
## 2024-05-23 ENCOUNTER — HOSPITAL ENCOUNTER (OUTPATIENT)
Dept: PHYSICAL THERAPY | Age: 39
Setting detail: THERAPIES SERIES
Discharge: HOME | End: 2024-05-23
Payer: OTHER GOVERNMENT

## 2024-05-23 PROCEDURE — G0283 ELEC STIM OTHER THAN WOUND: HCPCS

## 2024-05-23 PROCEDURE — 97110 THERAPEUTIC EXERCISES: CPT
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
## 2024-05-28 ENCOUNTER — HOSPITAL ENCOUNTER (OUTPATIENT)
Dept: PHYSICAL THERAPY | Age: 39
Setting detail: THERAPIES SERIES
Discharge: HOME | End: 2024-05-28
Payer: OTHER GOVERNMENT

## 2024-05-28 PROCEDURE — G0283 ELEC STIM OTHER THAN WOUND: HCPCS

## 2024-05-28 PROCEDURE — 97110 THERAPEUTIC EXERCISES: CPT
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
## 2024-05-30 ENCOUNTER — HOSPITAL ENCOUNTER (OUTPATIENT)
Dept: GENERAL RADIOLOGY | Age: 39
Discharge: HOME | End: 2024-06-01
Payer: OTHER GOVERNMENT

## 2024-05-30 VITALS — WEIGHT: 230 LBS | BODY MASS INDEX: 34.07 KG/M2 | HEIGHT: 69 IN

## 2024-05-30 DIAGNOSIS — Z12.31 VISIT FOR SCREENING MAMMOGRAM: ICD-10-CM

## 2024-05-30 PROCEDURE — 77063 BREAST TOMOSYNTHESIS BI: CPT

## 2024-06-10 ENCOUNTER — CLINICAL DOCUMENTATION (OUTPATIENT)
Dept: GENERAL RADIOLOGY | Age: 39
End: 2024-06-10

## 2024-06-10 NOTE — PROGRESS NOTES
Authorization for breast MRI (CPT 09068) has been obtained from [employer].  Authorization # [id number], valid until 9-8-24.  (Phone for auth is [phone].)

## 2024-07-08 ENCOUNTER — PATIENT MESSAGE (OUTPATIENT)
Dept: PRIMARY CARE CLINIC | Age: 39
End: 2024-07-08

## 2024-07-10 NOTE — TELEPHONE ENCOUNTER
From: Keysha Martinez  To: Juana Wilson  Sent: 7/8/2024 6:49 PM EDT  Subject: 7/1 esvint     Hello!     I had a Virtual appointment on July 1st. I was not seen due to the office being very behind that day which in turn timed my appointment out after a hour and a half. Is there a way I can get an appointment this week? Thank you.

## 2024-08-27 ENCOUNTER — CLINICAL DOCUMENTATION (OUTPATIENT)
Dept: GENERAL RADIOLOGY | Age: 39
End: 2024-08-27

## 2024-08-27 NOTE — PROGRESS NOTES
Per scheduling at [facility], they reached out to patient 4 times to schedule breast MRI:    6-18-24 Patient stated she would call on first day of her cycle.  7-19-24 Voice mail for patient.  8-9-24  Patient stated she would call on first day of her cycle.  8-26-24 Patient's voice mail is full.      Insurance authorization was obtained 6-10-24, it is expiring 9-8-24.  Physician notified.

## 2024-09-06 ENCOUNTER — OFFICE VISIT (OUTPATIENT)
Dept: PRIMARY CARE CLINIC | Age: 39
End: 2024-09-06
Payer: OTHER GOVERNMENT

## 2024-09-06 VITALS
DIASTOLIC BLOOD PRESSURE: 80 MMHG | TEMPERATURE: 97 F | RESPIRATION RATE: 17 BRPM | HEIGHT: 69 IN | WEIGHT: 255 LBS | OXYGEN SATURATION: 97 % | BODY MASS INDEX: 37.77 KG/M2 | SYSTOLIC BLOOD PRESSURE: 138 MMHG | HEART RATE: 76 BPM

## 2024-09-06 DIAGNOSIS — F32.A ANXIETY AND DEPRESSION: ICD-10-CM

## 2024-09-06 DIAGNOSIS — F41.9 ANXIETY AND DEPRESSION: ICD-10-CM

## 2024-09-06 DIAGNOSIS — E66.9 OBESITY (BMI 30-39.9): ICD-10-CM

## 2024-09-06 DIAGNOSIS — Z86.39 HISTORY OF THYROID NODULE: ICD-10-CM

## 2024-09-06 DIAGNOSIS — R00.2 PALPITATION: Primary | ICD-10-CM

## 2024-09-06 DIAGNOSIS — J45.20 MILD INTERMITTENT ASTHMA WITHOUT COMPLICATION: ICD-10-CM

## 2024-09-06 PROBLEM — E66.811 CLASS 1 OBESITY DUE TO EXCESS CALORIES WITHOUT SERIOUS COMORBIDITY WITH BODY MASS INDEX (BMI) OF 34.0 TO 34.9 IN ADULT: Status: RESOLVED | Noted: 2023-06-21 | Resolved: 2024-09-06

## 2024-09-06 PROBLEM — E04.1 THYROID NODULE: Status: RESOLVED | Noted: 2018-01-12 | Resolved: 2024-09-06

## 2024-09-06 PROBLEM — G89.18 POST-OP PAIN: Status: RESOLVED | Noted: 2023-03-14 | Resolved: 2024-09-06

## 2024-09-06 PROBLEM — E66.09 CLASS 1 OBESITY DUE TO EXCESS CALORIES WITHOUT SERIOUS COMORBIDITY WITH BODY MASS INDEX (BMI) OF 34.0 TO 34.9 IN ADULT: Status: RESOLVED | Noted: 2023-06-21 | Resolved: 2024-09-06

## 2024-09-06 LAB
ALBUMIN: 4.5 G/DL (ref 3.5–5.2)
ALP BLD-CCNC: 41 U/L (ref 35–104)
ALT SERPL-CCNC: 14 U/L (ref 0–32)
ANION GAP SERPL CALCULATED.3IONS-SCNC: 17 MMOL/L (ref 7–16)
AST SERPL-CCNC: 15 U/L (ref 0–31)
BASOPHILS ABSOLUTE: 0.04 K/UL (ref 0–0.2)
BASOPHILS RELATIVE PERCENT: 1 % (ref 0–2)
BILIRUB SERPL-MCNC: 0.5 MG/DL (ref 0–1.2)
BUN BLDV-MCNC: 15 MG/DL (ref 6–20)
CALCIUM SERPL-MCNC: 10.1 MG/DL (ref 8.6–10.2)
CHLORIDE BLD-SCNC: 103 MMOL/L (ref 98–107)
CO2: 20 MMOL/L (ref 22–29)
CREAT SERPL-MCNC: 0.7 MG/DL (ref 0.5–1)
EOSINOPHILS ABSOLUTE: 0.23 K/UL (ref 0.05–0.5)
EOSINOPHILS RELATIVE PERCENT: 3 % (ref 0–6)
GFR, ESTIMATED: >90 ML/MIN/1.73M2
GLUCOSE BLD-MCNC: 92 MG/DL (ref 74–99)
HCT VFR BLD CALC: 39.1 % (ref 34–48)
HEMOGLOBIN: 13.3 G/DL (ref 11.5–15.5)
IMMATURE GRANULOCYTES %: 0 % (ref 0–5)
IMMATURE GRANULOCYTES ABSOLUTE: 0.03 K/UL (ref 0–0.58)
LYMPHOCYTES ABSOLUTE: 2.19 K/UL (ref 1.5–4)
LYMPHOCYTES RELATIVE PERCENT: 28 % (ref 20–42)
MAGNESIUM: 1.9 MG/DL (ref 1.6–2.6)
MCH RBC QN AUTO: 31.4 PG (ref 26–35)
MCHC RBC AUTO-ENTMCNC: 34 G/DL (ref 32–34.5)
MCV RBC AUTO: 92.4 FL (ref 80–99.9)
MONOCYTES ABSOLUTE: 0.54 K/UL (ref 0.1–0.95)
MONOCYTES RELATIVE PERCENT: 7 % (ref 2–12)
NEUTROPHILS ABSOLUTE: 4.85 K/UL (ref 1.8–7.3)
NEUTROPHILS RELATIVE PERCENT: 62 % (ref 43–80)
PDW BLD-RTO: 11.9 % (ref 11.5–15)
PLATELET # BLD: 359 K/UL (ref 130–450)
PMV BLD AUTO: 10.8 FL (ref 7–12)
POTASSIUM SERPL-SCNC: 4.3 MMOL/L (ref 3.5–5)
RBC # BLD: 4.23 M/UL (ref 3.5–5.5)
SODIUM BLD-SCNC: 140 MMOL/L (ref 132–146)
T3 FREE: 3.73 PG/ML (ref 2–4.4)
T4 FREE: 1.4 NG/DL (ref 0.9–1.7)
TOTAL PROTEIN: 7.3 G/DL (ref 6.4–8.3)
TSH SERPL DL<=0.05 MIU/L-ACNC: 0.65 UIU/ML (ref 0.27–4.2)
VITAMIN D 25-HYDROXY: 31.3 NG/ML (ref 30–100)
WBC # BLD: 7.9 K/UL (ref 4.5–11.5)

## 2024-09-06 PROCEDURE — 99214 OFFICE O/P EST MOD 30 MIN: CPT | Performed by: PHYSICIAN ASSISTANT

## 2024-09-06 PROCEDURE — G8427 DOCREV CUR MEDS BY ELIG CLIN: HCPCS | Performed by: PHYSICIAN ASSISTANT

## 2024-09-06 PROCEDURE — G8417 CALC BMI ABV UP PARAM F/U: HCPCS | Performed by: PHYSICIAN ASSISTANT

## 2024-09-06 PROCEDURE — 36415 COLL VENOUS BLD VENIPUNCTURE: CPT | Performed by: PHYSICIAN ASSISTANT

## 2024-09-06 PROCEDURE — 1036F TOBACCO NON-USER: CPT | Performed by: PHYSICIAN ASSISTANT

## 2024-09-06 RX ORDER — ALBUTEROL SULFATE 90 UG/1
AEROSOL, METERED RESPIRATORY (INHALATION)
COMMUNITY

## 2024-09-06 SDOH — ECONOMIC STABILITY: FOOD INSECURITY: WITHIN THE PAST 12 MONTHS, YOU WORRIED THAT YOUR FOOD WOULD RUN OUT BEFORE YOU GOT MONEY TO BUY MORE.: PATIENT DECLINED

## 2024-09-06 SDOH — ECONOMIC STABILITY: FOOD INSECURITY: WITHIN THE PAST 12 MONTHS, THE FOOD YOU BOUGHT JUST DIDN'T LAST AND YOU DIDN'T HAVE MONEY TO GET MORE.: PATIENT DECLINED

## 2024-09-06 SDOH — ECONOMIC STABILITY: INCOME INSECURITY: HOW HARD IS IT FOR YOU TO PAY FOR THE VERY BASICS LIKE FOOD, HOUSING, MEDICAL CARE, AND HEATING?: PATIENT DECLINED

## 2024-09-06 SDOH — ECONOMIC STABILITY: TRANSPORTATION INSECURITY
IN THE PAST 12 MONTHS, HAS LACK OF TRANSPORTATION KEPT YOU FROM MEETINGS, WORK, OR FROM GETTING THINGS NEEDED FOR DAILY LIVING?: PATIENT DECLINED

## 2024-09-06 NOTE — PROGRESS NOTES
Chief Complaint   Patient presents with    Heart Problem     Heart palpitations, sob, high heart rates        HPI:  Patient is here for follow-up of heart palpitations.  She complains of  \"Heart racing, vision changes, gasping for air, gaining weight, hot flashes are out of control.\"   D/c Vyvanse did not change sx.  When I asked if anxiety can be contributing, she states  \"I think its beyond anxiety. She has had many years of therapy. No meds on board. She is reluctant to try meds, bc apparently she had many s/e in the past.   \"Gaining weight like it is my business.\"  She c/o le edema, which I do not appreciate on exam.   Negative KIANA in previous labs  She has a hx of thyroid nodule, that was not present on most recent u/s.   This year, she has had a ct head, cspine, mri lumbar spine, ct abdomen and pelvis. Reviewed    Cardiac Holter Monitor 5/8/24:  Baseline rhythm was normal sinus.  Rare isolated PACs and PVCs were observed.  No SVT or VT or PAF.  No significant pause or AV block.  Reported symptoms were correlated with normal sinus rhythm and sinus tahcycardia.   Echo 5/14/24:    Left Ventricle: EF by visual approximation is 65%. EF by 2D Simpsons Biplane is 65%. Left ventricle size is normal. Normal wall motion. Normal diastolic function.    Aortic Valve: Appears trileaflet valve.    Tricuspid Valve: The estimated RVSP is 16 mmHg.    Image quality is adequate.    Patient's past medical, surgical, social and/or family history reviewed, updated in chart, and are non-contributory (unless otherwise stated).  Medications and allergies also reviewed and updated in chart.    Review of Systems:  Constitutional:  No fever, + fatigue, no chills, no headaches, no weight change  Dermatology:  No rash, no mole, no dry or sensitive skin  ENT:  No cough, no sore throat, no sinus pain, no runny nose, no ear pain  Cardiology:  No chest pain, + palpitations, + leg edema, no shortness of breath, no

## 2024-09-10 PROBLEM — N92.6 IRREGULAR MENSTRUATION: Status: RESOLVED | Noted: 2018-10-05 | Resolved: 2024-09-10

## 2024-09-10 PROBLEM — K59.00 CONSTIPATION: Status: RESOLVED | Noted: 2022-01-07 | Resolved: 2024-09-10

## 2024-09-10 PROBLEM — J34.2 DNS (DEVIATED NASAL SEPTUM): Status: RESOLVED | Noted: 2023-02-09 | Resolved: 2024-09-10

## 2024-09-10 PROBLEM — R51.9 DAILY HEADACHE: Status: RESOLVED | Noted: 2023-06-21 | Resolved: 2024-09-10

## 2024-09-10 PROBLEM — F33.42 RECURRENT MAJOR DEPRESSIVE DISORDER, IN FULL REMISSION (HCC): Status: RESOLVED | Noted: 2021-09-01 | Resolved: 2024-09-10

## 2024-09-10 PROBLEM — N30.01 ACUTE CYSTITIS WITH HEMATURIA: Status: RESOLVED | Noted: 2022-01-07 | Resolved: 2024-09-10

## 2024-09-10 PROBLEM — L98.8 SKIN LESION OF BREAST: Status: RESOLVED | Noted: 2017-11-07 | Resolved: 2024-09-10

## 2024-09-10 PROBLEM — K21.9 GASTROESOPHAGEAL REFLUX DISEASE: Status: RESOLVED | Noted: 2023-10-17 | Resolved: 2024-09-10

## 2024-09-11 PROBLEM — R00.2 PALPITATIONS: Status: ACTIVE | Noted: 2024-09-11

## 2024-09-11 LAB
THYROGLOBULIN AB: <12 IU/ML (ref 0–40)
THYROID PEROXIDASE (TPO) AB: <4 IU/ML (ref 0–25)

## 2024-09-12 ENCOUNTER — OFFICE VISIT (OUTPATIENT)
Dept: CARDIOLOGY CLINIC | Age: 39
End: 2024-09-12
Payer: OTHER GOVERNMENT

## 2024-09-12 VITALS
BODY MASS INDEX: 37.44 KG/M2 | HEART RATE: 67 BPM | WEIGHT: 252.8 LBS | RESPIRATION RATE: 18 BRPM | HEIGHT: 69 IN | SYSTOLIC BLOOD PRESSURE: 111 MMHG | DIASTOLIC BLOOD PRESSURE: 76 MMHG

## 2024-09-12 DIAGNOSIS — R00.2 PALPITATIONS: Primary | ICD-10-CM

## 2024-09-12 DIAGNOSIS — R06.02 SHORTNESS OF BREATH: ICD-10-CM

## 2024-09-12 PROBLEM — E66.812 CLASS 2 OBESITY DUE TO EXCESS CALORIES WITHOUT SERIOUS COMORBIDITY WITH BODY MASS INDEX (BMI) OF 37.0 TO 37.9 IN ADULT: Status: ACTIVE | Noted: 2024-09-12

## 2024-09-12 PROBLEM — E66.09 CLASS 2 OBESITY DUE TO EXCESS CALORIES WITHOUT SERIOUS COMORBIDITY WITH BODY MASS INDEX (BMI) OF 37.0 TO 37.9 IN ADULT: Status: ACTIVE | Noted: 2024-09-12

## 2024-09-12 PROCEDURE — G8427 DOCREV CUR MEDS BY ELIG CLIN: HCPCS | Performed by: INTERNAL MEDICINE

## 2024-09-12 PROCEDURE — 93000 ELECTROCARDIOGRAM COMPLETE: CPT | Performed by: INTERNAL MEDICINE

## 2024-09-12 PROCEDURE — 99244 OFF/OP CNSLTJ NEW/EST MOD 40: CPT | Performed by: INTERNAL MEDICINE

## 2024-09-12 PROCEDURE — G8417 CALC BMI ABV UP PARAM F/U: HCPCS | Performed by: INTERNAL MEDICINE

## 2024-09-12 RX ORDER — FLUTICASONE PROPIONATE 50 MCG
1 SPRAY, SUSPENSION (ML) NASAL DAILY
COMMUNITY

## 2024-09-23 ENCOUNTER — TELEPHONE (OUTPATIENT)
Dept: CARDIOLOGY | Age: 39
End: 2024-09-23

## 2024-09-25 ENCOUNTER — HOSPITAL ENCOUNTER (OUTPATIENT)
Dept: CARDIOLOGY | Age: 39
Discharge: HOME | End: 2024-09-27
Attending: INTERNAL MEDICINE
Payer: OTHER GOVERNMENT

## 2024-09-25 VITALS
HEART RATE: 72 BPM | DIASTOLIC BLOOD PRESSURE: 84 MMHG | RESPIRATION RATE: 18 BRPM | HEIGHT: 69 IN | BODY MASS INDEX: 37.33 KG/M2 | WEIGHT: 252 LBS | SYSTOLIC BLOOD PRESSURE: 104 MMHG

## 2024-09-25 DIAGNOSIS — R06.02 SHORTNESS OF BREATH: ICD-10-CM

## 2024-09-25 LAB
ECHO BSA: 2.36 M2
STRESS BASELINE DIAS BP: 84 MMHG
STRESS BASELINE HR: 74 BPM
STRESS BASELINE ST DEPRESSION: 0 MM
STRESS BASELINE SYS BP: 104 MMHG
STRESS ESTIMATED WORKLOAD: 9.5 METS
STRESS EXERCISE DUR MIN: 6 MIN
STRESS EXERCISE DUR SEC: 50 SEC
STRESS O2 SAT PEAK: 95 %
STRESS O2 SAT REST: 96 %
STRESS PEAK DIAS BP: 80 MMHG
STRESS PEAK SYS BP: 150 MMHG
STRESS PERCENT HR ACHIEVED: 101 %
STRESS POST PEAK HR: 184 BPM
STRESS RATE PRESSURE PRODUCT: NORMAL BPM*MMHG
STRESS TARGET HR: 183 BPM

## 2024-09-25 PROCEDURE — 93017 CV STRESS TEST TRACING ONLY: CPT

## 2024-09-26 ENCOUNTER — TELEPHONE (OUTPATIENT)
Dept: CARDIOLOGY CLINIC | Age: 39
End: 2024-09-26

## 2024-10-04 ENCOUNTER — HOSPITAL ENCOUNTER (OUTPATIENT)
Dept: GENERAL RADIOLOGY | Age: 39
Discharge: HOME | End: 2024-10-06
Payer: OTHER GOVERNMENT

## 2024-10-04 VITALS — WEIGHT: 252 LBS | HEIGHT: 69 IN | BODY MASS INDEX: 37.33 KG/M2

## 2024-10-04 DIAGNOSIS — R92.2 INCONCLUSIVE MAMMOGRAPHY DUE TO DENSE BREASTS: ICD-10-CM

## 2024-10-04 DIAGNOSIS — R92.30 INCONCLUSIVE MAMMOGRAPHY DUE TO DENSE BREASTS: ICD-10-CM

## 2024-10-04 PROCEDURE — 76641 ULTRASOUND BREAST COMPLETE: CPT

## 2024-12-26 ENCOUNTER — OFFICE VISIT (OUTPATIENT)
Dept: RHEUMATOLOGY | Age: 39
End: 2024-12-26
Payer: OTHER GOVERNMENT

## 2024-12-26 VITALS
HEART RATE: 110 BPM | WEIGHT: 252 LBS | BODY MASS INDEX: 37.33 KG/M2 | DIASTOLIC BLOOD PRESSURE: 88 MMHG | OXYGEN SATURATION: 95 % | HEIGHT: 69 IN | SYSTOLIC BLOOD PRESSURE: 119 MMHG

## 2024-12-26 DIAGNOSIS — F32.A DEPRESSION, UNSPECIFIED DEPRESSION TYPE: ICD-10-CM

## 2024-12-26 DIAGNOSIS — F41.9 ANXIETY: ICD-10-CM

## 2024-12-26 DIAGNOSIS — M79.7 FIBROMYALGIA: Primary | ICD-10-CM

## 2024-12-26 PROCEDURE — G8417 CALC BMI ABV UP PARAM F/U: HCPCS | Performed by: STUDENT IN AN ORGANIZED HEALTH CARE EDUCATION/TRAINING PROGRAM

## 2024-12-26 PROCEDURE — G8484 FLU IMMUNIZE NO ADMIN: HCPCS | Performed by: STUDENT IN AN ORGANIZED HEALTH CARE EDUCATION/TRAINING PROGRAM

## 2024-12-26 PROCEDURE — 1036F TOBACCO NON-USER: CPT | Performed by: STUDENT IN AN ORGANIZED HEALTH CARE EDUCATION/TRAINING PROGRAM

## 2024-12-26 PROCEDURE — G8427 DOCREV CUR MEDS BY ELIG CLIN: HCPCS | Performed by: STUDENT IN AN ORGANIZED HEALTH CARE EDUCATION/TRAINING PROGRAM

## 2024-12-26 PROCEDURE — 99204 OFFICE O/P NEW MOD 45 MIN: CPT | Performed by: STUDENT IN AN ORGANIZED HEALTH CARE EDUCATION/TRAINING PROGRAM

## 2024-12-26 NOTE — PROGRESS NOTES
Keysha is here today as a new patient stating for a little over a year her entire body hurts to be touched.  She has twitches in her thumbs like needle pricks.  She also has pain to her hands, wrists, ankles, and feet.  She will have swelling to her hands and feet.  She will take IBU for her pain.    
CRP <0.1 01/25/2017        Assessment & Plan   ASSESSMENT/PLAN:    1. Fibromyalgia  2. Anxiety  3. Depression, unspecified depression type      Keysha Martinez 1985 is a 39 y.o. female with a history of PCOS, obesity, asthma, migraines, anxiety, depression, ADHD referred by primary care for arthralgia of both hands. She has widespread arthralgias and myalgias, allodynia, brain fog, fatigue, frequent headaches in the setting of anxiety and depression consistent with fibromyalgia. The diagnosis of fibromyalgia was discussed with the patient at length. Discussed management including pharmacologic and non-pharmacologic measures including management of comorbid anxiety, depression, or PTSD; low-impact weight bearing exercise; sleep hygiene; therapy/counseling; physical therapy; and alternative approaches such as masotherapy or acupuncture. Will defer management of fibromyalgia to PCP/psychiatry. KIANA is 1:40 which I would not consider positive and labs are otherwise not suggestive of underlying CTD or inflammatory process. No additional rheumatic workup recommended at this time.    Return if symptoms worsen or fail to improve.       An electronic signature was used to authenticate this note.  This note was generated with a voice recognition dictation system. Please disregard any errors which have may have escaped my review.    Lo Grande MD   Rheumatologist    I have spent 50 minutes providing care for this patient including face-to-face time, independent review and interpretation of lab and/or imaging results, and documentation.

## (undated) DEVICE — TUBING, SUCTION, 3/16" X 12', STRAIGHT: Brand: MEDLINE

## (undated) DEVICE — COUNTER NDL 30 COUNT DBL MAG

## (undated) DEVICE — SURGICAL PROCEDURE PACK EENT CUST

## (undated) DEVICE — SINU FOAM: Brand: SINU-FOAM

## (undated) DEVICE — SPONGE GZ W4XL4IN RAYON POLY CVR W/NONWOVEN FAB STRL 2/PK

## (undated) DEVICE — BLADE 1884380HR QUADCUT 5PK 4.3MM X 13CM: Brand: QUADCUT®

## (undated) DEVICE — GRADUATE TRIANG MEASURE 1000ML BLK PRNT

## (undated) DEVICE — BASIC SINGLE BASIN 1-LF: Brand: MEDLINE INDUSTRIES, INC.

## (undated) DEVICE — BLADE,STAINLESS-STEEL,15,STRL,DISPOSABLE: Brand: MEDLINE

## (undated) DEVICE — CODMAN® SURGICAL PATTIES 1/2" X 3" (1.27CM X 7.62CM): Brand: CODMAN®

## (undated) DEVICE — BLOCK BITE 60FR RUBBER ADLT DENTAL

## (undated) DEVICE — MARKER,SKIN,WI/RULER AND LABELS: Brand: MEDLINE

## (undated) DEVICE — NEEDLE FLTR 18GA L1.5IN MEM THK5UM BLNT DISP

## (undated) DEVICE — TOWEL,OR,DSP,ST,BLUE,STD,6/PK,12PK/CS: Brand: MEDLINE

## (undated) DEVICE — 4-PORT MANIFOLD: Brand: NEPTUNE 2

## (undated) DEVICE — KIT OPHTHLMC W/7.3CM X 7.3CM INSTRMNT WIPE 0.4CM X 15CM EYE

## (undated) DEVICE — DOUBLE BASIN SET: Brand: MEDLINE INDUSTRIES, INC.

## (undated) DEVICE — RETAINER 16IN ADJ EAR LOOP DRSG NSL NS

## (undated) DEVICE — SPLINT 1524055 DOYLE II AIRWAY SET: Brand: DOYLE II ™

## (undated) DEVICE — KIT,ANTI FOG,W/SPONGE & FLUID,SOFT PACK: Brand: MEDLINE

## (undated) DEVICE — GLOVE SURG SZ 75 L12IN FNGR THK94MIL TRNSLUC YEL LTX

## (undated) DEVICE — SYRINGE,CONTROL,LL,FINGER,GRIP: Brand: MEDLINE INDUSTRIES, INC.

## (undated) DEVICE — FORCEPS BX OVL CUP FEN DISPOSABLE CAP L 160CM RAD JAW 4

## (undated) DEVICE — SOLUTION IV IRRIG POUR BRL 0.9% SODIUM CHL 2F7124

## (undated) DEVICE — TURBINATOR WAND: Brand: COBLATION

## (undated) DEVICE — NEEDLE HYPO 25GA L1.5IN BLU POLYPR HUB S STL REG BVL STR